# Patient Record
Sex: FEMALE | URBAN - METROPOLITAN AREA
[De-identification: names, ages, dates, MRNs, and addresses within clinical notes are randomized per-mention and may not be internally consistent; named-entity substitution may affect disease eponyms.]

---

## 2017-05-16 ENCOUNTER — INPATIENT (INPATIENT)
Facility: HOSPITAL | Age: 77
LOS: 0 days | Discharge: HOME | End: 2017-05-17
Attending: HOSPITALIST | Admitting: INTERNAL MEDICINE
Payer: MEDICARE

## 2017-05-16 VITALS — WEIGHT: 179.9 LBS | HEIGHT: 66 IN

## 2017-05-16 DIAGNOSIS — I48.91 UNSPECIFIED ATRIAL FIBRILLATION: ICD-10-CM

## 2017-05-16 DIAGNOSIS — E11.9 TYPE 2 DIABETES MELLITUS WITHOUT COMPLICATIONS: ICD-10-CM

## 2017-05-16 DIAGNOSIS — R60.0 LOCALIZED EDEMA: ICD-10-CM

## 2017-05-16 DIAGNOSIS — Z98.890 OTHER SPECIFIED POSTPROCEDURAL STATES: Chronic | ICD-10-CM

## 2017-05-16 DIAGNOSIS — I10 ESSENTIAL (PRIMARY) HYPERTENSION: ICD-10-CM

## 2017-05-16 DIAGNOSIS — I50.9 HEART FAILURE, UNSPECIFIED: ICD-10-CM

## 2017-05-16 DIAGNOSIS — Z29.9 ENCOUNTER FOR PROPHYLACTIC MEASURES, UNSPECIFIED: ICD-10-CM

## 2017-05-16 DIAGNOSIS — E89.0 POSTPROCEDURAL HYPOTHYROIDISM: Chronic | ICD-10-CM

## 2017-05-16 DIAGNOSIS — R94.6 ABNORMAL RESULTS OF THYROID FUNCTION STUDIES: ICD-10-CM

## 2017-05-16 LAB
ALBUMIN SERPL ELPH-MCNC: 3.4 G/DL — SIGNIFICANT CHANGE UP (ref 3.3–5)
ALP SERPL-CCNC: 82 U/L — SIGNIFICANT CHANGE UP (ref 40–120)
ALT FLD-CCNC: 44 U/L — SIGNIFICANT CHANGE UP (ref 12–78)
ANION GAP SERPL CALC-SCNC: 9 MMOL/L — SIGNIFICANT CHANGE UP (ref 5–17)
APPEARANCE UR: CLEAR — SIGNIFICANT CHANGE UP
APTT BLD: 31.4 SEC — SIGNIFICANT CHANGE UP (ref 27.5–37.4)
AST SERPL-CCNC: 30 U/L — SIGNIFICANT CHANGE UP (ref 15–37)
BASOPHILS # BLD AUTO: 0.1 K/UL — SIGNIFICANT CHANGE UP (ref 0–0.2)
BASOPHILS NFR BLD AUTO: 0.9 % — SIGNIFICANT CHANGE UP (ref 0–2)
BILIRUB SERPL-MCNC: 0.5 MG/DL — SIGNIFICANT CHANGE UP (ref 0.2–1.2)
BILIRUB UR-MCNC: NEGATIVE — SIGNIFICANT CHANGE UP
BUN SERPL-MCNC: 16 MG/DL — SIGNIFICANT CHANGE UP (ref 7–23)
CALCIUM SERPL-MCNC: 9.1 MG/DL — SIGNIFICANT CHANGE UP (ref 8.5–10.1)
CHLORIDE SERPL-SCNC: 105 MMOL/L — SIGNIFICANT CHANGE UP (ref 96–108)
CO2 SERPL-SCNC: 29 MMOL/L — SIGNIFICANT CHANGE UP (ref 22–31)
COLOR SPEC: YELLOW — SIGNIFICANT CHANGE UP
CREAT SERPL-MCNC: 0.68 MG/DL — SIGNIFICANT CHANGE UP (ref 0.5–1.3)
DIFF PNL FLD: NEGATIVE — SIGNIFICANT CHANGE UP
EOSINOPHIL # BLD AUTO: 0.1 K/UL — SIGNIFICANT CHANGE UP (ref 0–0.5)
EOSINOPHIL NFR BLD AUTO: 1.8 % — SIGNIFICANT CHANGE UP (ref 0–6)
GLUCOSE SERPL-MCNC: 102 MG/DL — HIGH (ref 70–99)
GLUCOSE UR QL: NEGATIVE MG/DL — SIGNIFICANT CHANGE UP
HCT VFR BLD CALC: 38.7 % — SIGNIFICANT CHANGE UP (ref 34.5–45)
HGB BLD-MCNC: 12.4 G/DL — SIGNIFICANT CHANGE UP (ref 11.5–15.5)
INR BLD: 1.1 RATIO — SIGNIFICANT CHANGE UP (ref 0.88–1.16)
KETONES UR-MCNC: NEGATIVE — SIGNIFICANT CHANGE UP
LEUKOCYTE ESTERASE UR-ACNC: NEGATIVE — SIGNIFICANT CHANGE UP
LYMPHOCYTES # BLD AUTO: 1.4 K/UL — SIGNIFICANT CHANGE UP (ref 1–3.3)
LYMPHOCYTES # BLD AUTO: 19.4 % — SIGNIFICANT CHANGE UP (ref 13–44)
MAGNESIUM SERPL-MCNC: 2.2 MG/DL — SIGNIFICANT CHANGE UP (ref 1.6–2.6)
MCHC RBC-ENTMCNC: 29 PG — SIGNIFICANT CHANGE UP (ref 27–34)
MCHC RBC-ENTMCNC: 32 GM/DL — SIGNIFICANT CHANGE UP (ref 32–36)
MCV RBC AUTO: 90.6 FL — SIGNIFICANT CHANGE UP (ref 80–100)
MONOCYTES # BLD AUTO: 0.7 K/UL — SIGNIFICANT CHANGE UP (ref 0–0.9)
MONOCYTES NFR BLD AUTO: 9.5 % — SIGNIFICANT CHANGE UP (ref 2–14)
NEUTROPHILS # BLD AUTO: 4.8 K/UL — SIGNIFICANT CHANGE UP (ref 1.8–7.4)
NEUTROPHILS NFR BLD AUTO: 68.3 % — SIGNIFICANT CHANGE UP (ref 43–77)
NITRITE UR-MCNC: NEGATIVE — SIGNIFICANT CHANGE UP
NT-PROBNP SERPL-SCNC: 2362 PG/ML — HIGH (ref 0–450)
PH UR: 7 — SIGNIFICANT CHANGE UP (ref 5–8)
PLATELET # BLD AUTO: 239 K/UL — SIGNIFICANT CHANGE UP (ref 150–400)
POTASSIUM SERPL-MCNC: 4 MMOL/L — SIGNIFICANT CHANGE UP (ref 3.5–5.3)
POTASSIUM SERPL-SCNC: 4 MMOL/L — SIGNIFICANT CHANGE UP (ref 3.5–5.3)
PROT SERPL-MCNC: 6.9 GM/DL — SIGNIFICANT CHANGE UP (ref 6–8.3)
PROT UR-MCNC: NEGATIVE MG/DL — SIGNIFICANT CHANGE UP
PROTHROM AB SERPL-ACNC: 11.9 SEC — SIGNIFICANT CHANGE UP (ref 9.8–12.7)
RBC # BLD: 4.27 M/UL — SIGNIFICANT CHANGE UP (ref 3.8–5.2)
RBC # FLD: 14.3 % — SIGNIFICANT CHANGE UP (ref 10.3–14.5)
SODIUM SERPL-SCNC: 143 MMOL/L — SIGNIFICANT CHANGE UP (ref 135–145)
SP GR SPEC: 1 — LOW (ref 1.01–1.02)
TROPONIN I SERPL-MCNC: <0.015 NG/ML — SIGNIFICANT CHANGE UP (ref 0.01–0.04)
TSH SERPL-MCNC: 5.36 UU/ML — HIGH (ref 0.36–3.74)
UROBILINOGEN FLD QL: NEGATIVE MG/DL — SIGNIFICANT CHANGE UP
WBC # BLD: 7.1 K/UL — SIGNIFICANT CHANGE UP (ref 3.8–10.5)
WBC # FLD AUTO: 7.1 K/UL — SIGNIFICANT CHANGE UP (ref 3.8–10.5)

## 2017-05-16 PROCEDURE — 93970 EXTREMITY STUDY: CPT | Mod: 26

## 2017-05-16 PROCEDURE — 93010 ELECTROCARDIOGRAM REPORT: CPT

## 2017-05-16 PROCEDURE — 99285 EMERGENCY DEPT VISIT HI MDM: CPT

## 2017-05-16 PROCEDURE — 71020: CPT | Mod: 26

## 2017-05-16 RX ORDER — HEPARIN SODIUM 5000 [USP'U]/ML
7000 INJECTION INTRAVENOUS; SUBCUTANEOUS ONCE
Qty: 0 | Refills: 0 | Status: COMPLETED | OUTPATIENT
Start: 2017-05-16 | End: 2017-05-16

## 2017-05-16 RX ORDER — DEXTROSE 50 % IN WATER 50 %
25 SYRINGE (ML) INTRAVENOUS ONCE
Qty: 0 | Refills: 0 | Status: DISCONTINUED | OUTPATIENT
Start: 2017-05-16 | End: 2017-05-17

## 2017-05-16 RX ORDER — HEPARIN SODIUM 5000 [USP'U]/ML
3500 INJECTION INTRAVENOUS; SUBCUTANEOUS EVERY 6 HOURS
Qty: 0 | Refills: 0 | Status: DISCONTINUED | OUTPATIENT
Start: 2017-05-16 | End: 2017-05-17

## 2017-05-16 RX ORDER — HEPARIN SODIUM 5000 [USP'U]/ML
7000 INJECTION INTRAVENOUS; SUBCUTANEOUS EVERY 6 HOURS
Qty: 0 | Refills: 0 | Status: DISCONTINUED | OUTPATIENT
Start: 2017-05-16 | End: 2017-05-17

## 2017-05-16 RX ORDER — BUDESONIDE, MICRONIZED 100 %
3 POWDER (GRAM) MISCELLANEOUS
Qty: 0 | Refills: 0 | COMMUNITY

## 2017-05-16 RX ORDER — FUROSEMIDE 40 MG
20 TABLET ORAL
Qty: 0 | Refills: 0 | Status: DISCONTINUED | OUTPATIENT
Start: 2017-05-17 | End: 2017-05-17

## 2017-05-16 RX ORDER — HEPARIN SODIUM 5000 [USP'U]/ML
6500 INJECTION INTRAVENOUS; SUBCUTANEOUS EVERY 6 HOURS
Qty: 0 | Refills: 0 | Status: DISCONTINUED | OUTPATIENT
Start: 2017-05-16 | End: 2017-05-16

## 2017-05-16 RX ORDER — DEXTROSE 50 % IN WATER 50 %
12.5 SYRINGE (ML) INTRAVENOUS ONCE
Qty: 0 | Refills: 0 | Status: DISCONTINUED | OUTPATIENT
Start: 2017-05-16 | End: 2017-05-17

## 2017-05-16 RX ORDER — DEXTROSE 50 % IN WATER 50 %
1 SYRINGE (ML) INTRAVENOUS ONCE
Qty: 0 | Refills: 0 | Status: DISCONTINUED | OUTPATIENT
Start: 2017-05-16 | End: 2017-05-17

## 2017-05-16 RX ORDER — TRAMADOL HYDROCHLORIDE 50 MG/1
1 TABLET ORAL
Qty: 0 | Refills: 0 | COMMUNITY

## 2017-05-16 RX ORDER — SENNA PLUS 8.6 MG/1
2 TABLET ORAL AT BEDTIME
Qty: 0 | Refills: 0 | Status: DISCONTINUED | OUTPATIENT
Start: 2017-05-16 | End: 2017-05-17

## 2017-05-16 RX ORDER — SODIUM CHLORIDE 9 MG/ML
3 INJECTION INTRAMUSCULAR; INTRAVENOUS; SUBCUTANEOUS ONCE
Qty: 0 | Refills: 0 | Status: COMPLETED | OUTPATIENT
Start: 2017-05-16 | End: 2017-05-16

## 2017-05-16 RX ORDER — DOCUSATE SODIUM 100 MG
100 CAPSULE ORAL THREE TIMES A DAY
Qty: 0 | Refills: 0 | Status: DISCONTINUED | OUTPATIENT
Start: 2017-05-16 | End: 2017-05-17

## 2017-05-16 RX ORDER — VALSARTAN 80 MG/1
1 TABLET ORAL
Qty: 0 | Refills: 0 | COMMUNITY

## 2017-05-16 RX ORDER — HEPARIN SODIUM 5000 [USP'U]/ML
INJECTION INTRAVENOUS; SUBCUTANEOUS
Qty: 25000 | Refills: 0 | Status: DISCONTINUED | OUTPATIENT
Start: 2017-05-16 | End: 2017-05-17

## 2017-05-16 RX ORDER — ESCITALOPRAM OXALATE 10 MG/1
1 TABLET, FILM COATED ORAL
Qty: 0 | Refills: 0 | COMMUNITY

## 2017-05-16 RX ORDER — ASPIRIN/CALCIUM CARB/MAGNESIUM 324 MG
81 TABLET ORAL DAILY
Qty: 0 | Refills: 0 | Status: DISCONTINUED | OUTPATIENT
Start: 2017-05-16 | End: 2017-05-17

## 2017-05-16 RX ORDER — BUDESONIDE, MICRONIZED 100 %
9 POWDER (GRAM) MISCELLANEOUS DAILY
Qty: 0 | Refills: 0 | Status: DISCONTINUED | OUTPATIENT
Start: 2017-05-16 | End: 2017-05-17

## 2017-05-16 RX ORDER — ONDANSETRON 8 MG/1
4 TABLET, FILM COATED ORAL EVERY 6 HOURS
Qty: 0 | Refills: 0 | Status: DISCONTINUED | OUTPATIENT
Start: 2017-05-16 | End: 2017-05-17

## 2017-05-16 RX ORDER — METHYLPREDNISOLONE 4 MG
500 TABLET ORAL
Qty: 0 | Refills: 0 | Status: DISCONTINUED | OUTPATIENT
Start: 2017-05-16 | End: 2017-05-17

## 2017-05-16 RX ORDER — HEPARIN SODIUM 5000 [USP'U]/ML
INJECTION INTRAVENOUS; SUBCUTANEOUS
Qty: 25000 | Refills: 0 | Status: DISCONTINUED | OUTPATIENT
Start: 2017-05-16 | End: 2017-05-16

## 2017-05-16 RX ORDER — SODIUM CHLORIDE 9 MG/ML
1000 INJECTION, SOLUTION INTRAVENOUS
Qty: 0 | Refills: 0 | Status: DISCONTINUED | OUTPATIENT
Start: 2017-05-16 | End: 2017-05-17

## 2017-05-16 RX ORDER — VALSARTAN 80 MG/1
160 TABLET ORAL DAILY
Qty: 0 | Refills: 0 | Status: DISCONTINUED | OUTPATIENT
Start: 2017-05-17 | End: 2017-05-17

## 2017-05-16 RX ORDER — GLUCAGON INJECTION, SOLUTION 0.5 MG/.1ML
1 INJECTION, SOLUTION SUBCUTANEOUS ONCE
Qty: 0 | Refills: 0 | Status: DISCONTINUED | OUTPATIENT
Start: 2017-05-16 | End: 2017-05-17

## 2017-05-16 RX ORDER — LOSARTAN POTASSIUM 100 MG/1
50 TABLET, FILM COATED ORAL DAILY
Qty: 0 | Refills: 0 | Status: DISCONTINUED | OUTPATIENT
Start: 2017-05-16 | End: 2017-05-16

## 2017-05-16 RX ORDER — HEPARIN SODIUM 5000 [USP'U]/ML
6500 INJECTION INTRAVENOUS; SUBCUTANEOUS ONCE
Qty: 0 | Refills: 0 | Status: DISCONTINUED | OUTPATIENT
Start: 2017-05-16 | End: 2017-05-16

## 2017-05-16 RX ORDER — HEPARIN SODIUM 5000 [USP'U]/ML
3000 INJECTION INTRAVENOUS; SUBCUTANEOUS EVERY 6 HOURS
Qty: 0 | Refills: 0 | Status: DISCONTINUED | OUTPATIENT
Start: 2017-05-16 | End: 2017-05-16

## 2017-05-16 RX ADMIN — HEPARIN SODIUM 1600 UNIT(S)/HR: 5000 INJECTION INTRAVENOUS; SUBCUTANEOUS at 21:58

## 2017-05-16 RX ADMIN — Medication 9 MILLIGRAM(S): at 22:01

## 2017-05-16 RX ADMIN — HEPARIN SODIUM 7000 UNIT(S): 5000 INJECTION INTRAVENOUS; SUBCUTANEOUS at 21:55

## 2017-05-16 RX ADMIN — SODIUM CHLORIDE 3 MILLILITER(S): 9 INJECTION INTRAMUSCULAR; INTRAVENOUS; SUBCUTANEOUS at 15:02

## 2017-05-16 NOTE — ED PROVIDER NOTE - MUSCULOSKELETAL, MLM
Spine appears normal, range of motion is not limited, no muscle or joint tenderness. Bilateral lower extremity edema.

## 2017-05-16 NOTE — ED ADULT NURSE REASSESSMENT NOTE - NS ED NURSE REASSESS COMMENT FT1
Pt resting comfortably. Safety maintained. A+Ox3 VSS. Updated on admission status. Awaiting Tele orders and bed assignment.Will continue to monitor.
 noted on monitor while urinating at bedside on commode. pt c/p palpitation. at bedside. pt denies palpitation and hr 89 noted on monitor. Daughter at bedside. Will cont to monitor for safety and comfort.
Pt actual weight of 90kg noted. Dr. farrell made aware and waiting for a new heparin order. Will cont to monitor for safety and comfort.
Report taken at the change of shift at bedside. Pt awake alert and oriented x4 resting comfortably in bed with no acute distress noted. Vitals stable. Denies cp,sob,ha,dz,n/v/d/fever/chills or urinary sx at this time. daughter at bedside. Will cont to monitor for safety and comfort.
request food. Dr. Batres made aware and states pt okay to eat. Benson and ginger ale provided. Pt tolerating po intake well. Will cont ot monitor for safety and comfort.

## 2017-05-16 NOTE — ED PROVIDER NOTE - CARE PLAN
Principal Discharge DX:	Atrial fibrillation, new onset  Secondary Diagnosis:	Acute congestive heart failure, unspecified congestive heart failure type

## 2017-05-16 NOTE — H&P ADULT - PROBLEM SELECTOR PLAN 4
~pt with prior hx of papillary thyroid ca s/p partial thyroidectomy  ~f/u Thyroid profile to further assess

## 2017-05-16 NOTE — H&P ADULT - PROBLEM SELECTOR PLAN 6
~pt takes Avandia 4mg po daily at home  ~will d/c from this point  ~FS qAC  ~cont. ISS per protocol ~pt takes Avandia 4mg po daily at home  ~will d/c from this point (RE; Glitazone-CHF link)  ~FS qAC  ~cont. ISS per protocol

## 2017-05-16 NOTE — ED PROVIDER NOTE - PROGRESS NOTE DETAILS
Amanda Jackson, on behalf of Attending, Patient with episodes of afib in the 140's, improving without meds. Will admit patient.

## 2017-05-16 NOTE — ED PROVIDER NOTE - OBJECTIVE STATEMENT
77 y/o F with hx of HTN and GERD presents to the ED c/o intermittent chest pressure and palpitations x 3 days. C/o discomfort in the abd like a bloating worse at night. Pressure in Chest is described as a "slight pushing" in the mid chest. She also notes some difficulty breathing and generalized weakness. Pt has no known hx of afib. No smoking, Drug use, ETOH use. PMD is Dr. Bj Coates.

## 2017-05-16 NOTE — ED PROVIDER NOTE - NS ED MD SCRIBE ATTENDING SCRIBE SECTIONS
RESULTS/DISPOSITION/HISTORY OF PRESENT ILLNESS/VITAL SIGNS( Pullset)/PHYSICAL EXAM/REVIEW OF SYSTEMS/PROGRESS NOTE/PAST MEDICAL/SURGICAL/SOCIAL HISTORY

## 2017-05-16 NOTE — H&P ADULT - ASSESSMENT
75 y/o F PMHx significant for HTN, GERD who presents to the ED w/ c/o intermittent mid-sternal chest pressure associated w/ palpitations over the past 3 days duration. The patient notes increased symptoms of dyspnea, and generalized malaise. In the ED the patient was found to be in AFib w/ RVR in the 140s which improved without intervention. At the patient time the patient remains in AFib w/ RVR 90s-110/min. Labs were notable for pro-BNP 2362, TSH 5.36. CXR was c/w CHF. In the ED the patient was started on Heparin gtt per protocol, and Diltiazem 30mg PO q6h.

## 2017-05-16 NOTE — H&P ADULT - PSH
H/O partial thyroidectomy  hx of Papillary Thyroid Ca s/p partial thyroidectomy 7/2014  S/P right knee arthroscopy  4/2006

## 2017-05-16 NOTE — ED STATDOCS - PROGRESS NOTE DETAILS
Manuel Philip: 77 y/o F with hx of HTN and GERD presents to the ED c/o intermittent chest pressure and palpitations x 3 days. She also notes some difficulty breathing and generalized weakness. Pt has no known hx of afib. Pt to be sent to main ED for further evaluation.

## 2017-05-16 NOTE — H&P ADULT - NSHPPHYSICALEXAM_GEN_ALL_CORE
Vital Signs Last 24 Hrs  T(C): 36.4, Max: 36.4 (05-16 @ 12:54)  T(F): 97.6, Max: 97.6 (05-16 @ 12:54)  HR: 94 (94 - 94)  BP: 135/75 (135/75 - 135/75)  RR: 18 (18 - 18)  SpO2: 96% (96% - 96%)

## 2017-05-16 NOTE — ED STATDOCS - DETAILS:
I, Omar Lee MD, performed the initial face to face bedside interview with this patient regarding history of present illness and determined that the patient should be seen in the main ED.  The history, was documented by the scribe in my presence and I attest to the accuracy of the documentation.

## 2017-05-17 VITALS
TEMPERATURE: 98 F | OXYGEN SATURATION: 97 % | HEART RATE: 102 BPM | DIASTOLIC BLOOD PRESSURE: 80 MMHG | SYSTOLIC BLOOD PRESSURE: 128 MMHG

## 2017-05-17 LAB
APTT BLD: 131.6 SEC — CRITICAL HIGH (ref 27.5–37.4)
APTT BLD: > 200 SEC (ref 27.5–37.4)
HBA1C BLD-MCNC: 6.2 % — HIGH (ref 4–5.6)
HCT VFR BLD CALC: 37.2 % — SIGNIFICANT CHANGE UP (ref 34.5–45)
HGB BLD-MCNC: 11.6 G/DL — SIGNIFICANT CHANGE UP (ref 11.5–15.5)
MCHC RBC-ENTMCNC: 28.8 PG — SIGNIFICANT CHANGE UP (ref 27–34)
MCHC RBC-ENTMCNC: 31.3 GM/DL — LOW (ref 32–36)
MCV RBC AUTO: 92.1 FL — SIGNIFICANT CHANGE UP (ref 80–100)
PLATELET # BLD AUTO: 196 K/UL — SIGNIFICANT CHANGE UP (ref 150–400)
RBC # BLD: 4.04 M/UL — SIGNIFICANT CHANGE UP (ref 3.8–5.2)
RBC # FLD: 15.4 % — HIGH (ref 10.3–14.5)
T3 SERPL-MCNC: 109 NG/DL — SIGNIFICANT CHANGE UP (ref 80–200)
T4 AB SER-ACNC: 9 UG/DL — SIGNIFICANT CHANGE UP (ref 4.6–12)
TROPONIN I SERPL-MCNC: <0.015 NG/ML — SIGNIFICANT CHANGE UP (ref 0.01–0.04)
WBC # BLD: 8 K/UL — SIGNIFICANT CHANGE UP (ref 3.8–10.5)
WBC # FLD AUTO: 8 K/UL — SIGNIFICANT CHANGE UP (ref 3.8–10.5)

## 2017-05-17 PROCEDURE — 93306 TTE W/DOPPLER COMPLETE: CPT | Mod: 26

## 2017-05-17 RX ORDER — VALSARTAN 80 MG/1
1 TABLET ORAL
Qty: 30 | Refills: 0
Start: 2017-05-17

## 2017-05-17 RX ORDER — PANTOPRAZOLE SODIUM 20 MG/1
40 TABLET, DELAYED RELEASE ORAL
Qty: 0 | Refills: 0 | Status: DISCONTINUED | OUTPATIENT
Start: 2017-05-17 | End: 2017-05-17

## 2017-05-17 RX ORDER — ASPIRIN/CALCIUM CARB/MAGNESIUM 324 MG
1 TABLET ORAL
Qty: 0 | Refills: 0 | DISCHARGE
Start: 2017-05-17

## 2017-05-17 RX ORDER — RIVAROXABAN 15 MG-20MG
20 KIT ORAL DAILY
Qty: 0 | Refills: 0 | Status: DISCONTINUED | OUTPATIENT
Start: 2017-05-17 | End: 2017-05-17

## 2017-05-17 RX ORDER — ACETAMINOPHEN 500 MG
650 TABLET ORAL EVERY 6 HOURS
Qty: 0 | Refills: 0 | Status: DISCONTINUED | OUTPATIENT
Start: 2017-05-17 | End: 2017-05-17

## 2017-05-17 RX ORDER — RIVAROXABAN 15 MG-20MG
1 KIT ORAL
Qty: 30 | Refills: 0
Start: 2017-05-17

## 2017-05-17 RX ORDER — DILTIAZEM HCL 120 MG
1 CAPSULE, EXT RELEASE 24 HR ORAL
Qty: 30 | Refills: 0
Start: 2017-05-17

## 2017-05-17 RX ORDER — MAGNESIUM OXIDE 400 MG ORAL TABLET 241.3 MG
400 TABLET ORAL EVERY 12 HOURS
Qty: 0 | Refills: 0 | Status: DISCONTINUED | OUTPATIENT
Start: 2017-05-17 | End: 2017-05-17

## 2017-05-17 RX ADMIN — Medication 20 MILLIGRAM(S): at 07:25

## 2017-05-17 RX ADMIN — Medication 9 MILLIGRAM(S): at 07:25

## 2017-05-17 RX ADMIN — PANTOPRAZOLE SODIUM 40 MILLIGRAM(S): 20 TABLET, DELAYED RELEASE ORAL at 11:27

## 2017-05-17 RX ADMIN — VALSARTAN 160 MILLIGRAM(S): 80 TABLET ORAL at 07:24

## 2017-05-17 RX ADMIN — Medication 650 MILLIGRAM(S): at 14:49

## 2017-05-17 RX ADMIN — RIVAROXABAN 20 MILLIGRAM(S): KIT at 14:49

## 2017-05-17 RX ADMIN — HEPARIN SODIUM 0 UNIT(S)/HR: 5000 INJECTION INTRAVENOUS; SUBCUTANEOUS at 13:24

## 2017-05-17 RX ADMIN — HEPARIN SODIUM 0 UNIT(S)/HR: 5000 INJECTION INTRAVENOUS; SUBCUTANEOUS at 05:10

## 2017-05-17 RX ADMIN — Medication 81 MILLIGRAM(S): at 11:26

## 2017-05-17 RX ADMIN — Medication 650 MILLIGRAM(S): at 00:14

## 2017-05-17 RX ADMIN — HEPARIN SODIUM 1300 UNIT(S)/HR: 5000 INJECTION INTRAVENOUS; SUBCUTANEOUS at 06:15

## 2017-05-17 NOTE — DISCHARGE NOTE ADULT - CARE PROVIDER_API CALL
Jaswant Cerda), Cardiovascular Disease; Internal Medicine; Nuclear Cardiology  [address]  [place] [address]  Phone: [phone]  Fax: [phone]    Yoni Garcia (), Family Medicine  [address] [place] [address]  Phone: [phone]  Fax: [phone]

## 2017-05-17 NOTE — DISCHARGE NOTE ADULT - MEDICATION SUMMARY - MEDICATIONS TO TAKE
I will START or STAY ON the medications listed below when I get home from the hospital:    freetext medication  -  -- 1 tab(s) by mouth once a day  -- Indication: For Dyslipidemia    Entocort EC 3 mg oral capsule, extended release  -- 3 cap(s) by mouth once a day  -- Indication: For immunotherapy    aspirin 81 mg oral delayed release tablet  -- 1 tab(s) by mouth once a day  -- Indication: For Preventive medicine    valsartan 160 mg oral tablet  -- 1 tab(s) by mouth once a day  -- Indication: For Htn    Cardizem  mg/24 hours oral capsule, extended release  -- 1 cap(s) by mouth once a day  -- It is very important that you take or use this exactly as directed.  Do not skip doses or discontinue unless directed by your doctor.  Some non-prescription drugs may aggravate your condition.  Read all labels carefully.  If a warning appears, check with your doctor before taking.  Swallow whole.  Do not crush.    -- Indication: For Atrial fibrillation, new onset    Xarelto 20 mg oral tablet  -- 1 tab(s) by mouth once a day  -- Check with your doctor before becoming pregnant.  It is very important that you take or use this exactly as directed.  Do not skip doses or discontinue unless directed by your doctor.  Obtain medical advice before taking any non-prescription drugs as some may affect the action of this medication.  Take with food.    -- Indication: For Atrial fibrillation, new onset    Avandia 4 mg oral tablet  -- 1 tab(s) by mouth once a day  -- Indication: For Diabetes    Vytorin 10 mg-40 mg oral tablet  -- 1 tab(s) by mouth once a day  -- Indication: For Dyslipdemia    magnesium gluconate 500 mg oral tablet  -- 1 tab(s) by mouth 2 times a day  -- Indication: For Supplement    omeprazole 40 mg oral delayed release capsule  -- 1 cap(s) by mouth once a day  -- Indication: For Gerd    multivitamin  --     -- Indication: For Supplement    Vitamin C 1000 mg oral tablet  -- 1 tab(s) by mouth once a day  -- Indication: For Supplement I will START or STAY ON the medications listed below when I get home from the hospital:    freetext medication  -  -- 1 tab(s) by mouth once a day  -- Indication: For Dyslipidemia    Entocort EC 3 mg oral capsule, extended release  -- 3 cap(s) by mouth once a day  -- Indication: For immunotherapy    aspirin 81 mg oral delayed release tablet  -- 1 tab(s) by mouth once a day  -- Indication: For Preventive medicine    Diovan 160 mg oral tablet  -- 1 tab(s) by mouth once a day  -- Do not take this drug if you are pregnant.  It is very important that you take or use this exactly as directed.  Do not skip doses or discontinue unless directed by your doctor.  Some non-prescription drugs may aggravate your condition.  Read all labels carefully.  If a warning appears, check with your doctor before taking.    -- Indication: For Htn    Cardizem  mg/24 hours oral capsule, extended release  -- 1 cap(s) by mouth once a day  -- It is very important that you take or use this exactly as directed.  Do not skip doses or discontinue unless directed by your doctor.  Some non-prescription drugs may aggravate your condition.  Read all labels carefully.  If a warning appears, check with your doctor before taking.  Swallow whole.  Do not crush.    -- Indication: For Atrial fibrillation, new onset    Xarelto 20 mg oral tablet  -- 1 tab(s) by mouth once a day  -- Check with your doctor before becoming pregnant.  It is very important that you take or use this exactly as directed.  Do not skip doses or discontinue unless directed by your doctor.  Obtain medical advice before taking any non-prescription drugs as some may affect the action of this medication.  Take with food.    -- Indication: For Atrial fibrillation, new onset    Vytorin 10 mg-40 mg oral tablet  -- 1 tab(s) by mouth once a day  -- Indication: For Dyslipdemia    hydroCHLOROthiazide 12.5 mg oral capsule  -- 1 cap(s) by mouth every 48 hours  -- Avoid prolonged or excessive exposure to direct and/or artificial sunlight while taking this medication.  It is very important that you take or use this exactly as directed.  Do not skip doses or discontinue unless directed by your doctor.  It may be advisable to drink a full glass orange juice or eat a banana daily while taking this medication.  Take with food or milk.    -- Indication: For le edema    magnesium gluconate 500 mg oral tablet  -- 1 tab(s) by mouth 2 times a day  -- Indication: For Supplement    omeprazole 40 mg oral delayed release capsule  -- 1 cap(s) by mouth once a day  -- Indication: For Gerd    multivitamin  --     -- Indication: For Supplement    Vitamin C 1000 mg oral tablet  -- 1 tab(s) by mouth once a day  -- Indication: For Supplement

## 2017-05-17 NOTE — DISCHARGE NOTE ADULT - PLAN OF CARE
anticoagulation with xarelto follow up with Dr Spangler, if any SOB chest pain or palpitations, call [phone]

## 2017-05-17 NOTE — CONSULT NOTE ADULT - ASSESSMENT
77 y/o F new onset AF with rapid ventricular rates         ·  Problem: Atrial fibrillation, increase cardizem for rate control  ~serial Goldie- normal   2DECHO in the am  ~pt started on Heparin gtt per protocol as [id number] score is 5. - can transition to  xarelto or eliquis          Bilateral edema of lower extremity.  Plan: ~likely due to volume overload/CHF  ~r/o DVT; f/u B/L LE dopplers.         Essential hypertension.  Plan: ~will cont. Diovan 160mg po daily.

## 2017-05-17 NOTE — DISCHARGE NOTE ADULT - MEDICATION SUMMARY - MEDICATIONS TO STOP TAKING
I will STOP taking the medications listed below when I get home from the hospital:    Diovan  mg-25 mg oral tablet  -- 1 tab(s) by mouth once a day

## 2017-05-17 NOTE — DISCHARGE NOTE ADULT - NS AS DC STROKE ED MATERIALS
Prescribed Medications/Need for Followup After Discharge/Stroke Warning Signs and Symptoms/Stroke Education Booklet/Call 911 for Stroke/Risk Factors for Stroke

## 2017-05-17 NOTE — DISCHARGE NOTE ADULT - CARE PLAN
Principal Discharge DX:	Atrial fibrillation, new onset  Goal:	anticoagulation with xarelto  Instructions for follow-up, activity and diet:	follow up with Dr Spangler, if any SOB chest pain or palpitations, call [phone]

## 2017-05-17 NOTE — DISCHARGE NOTE ADULT - HOSPITAL COURSE
77 y/o F PMHx significant for HTN, GERD who presents to the ED w/ c/o intermittent mid-sternal chest pressure associated w/ palpitations over the past 3 days duration. The patient notes increased symptoms of dyspnea, and generalized malaise. In the ED the patient was found to be in AFib w/ RVR in the 140s which improved without intervention. At the patient time the patient remains in AFib w/ RVR 90s-110/min. Echo neg for valvular etiology for new onset afib.CHADS VASC 5. Cardio recc include CCB with addition of A/C with NOAC.       REVIEW OF SYSTEMS:  Neg on 10 point system          Vital Signs Last 24 Hrs  T(C): 36.6, Max: 36.8 (05-17 @ 05:09)  T(F): 97.8, Max: 98.3 (05-17 @ 05:09)  HR: 92 (90 - 98)  BP: 121/65 (121/65 - 140/79)  BP(mean): --  RR: 17 (17 - 18)  SpO2: 97% (95% - 99%)            PHYSICAL EXAM:    Constitutional: NAD, awake and alert, well-developed  HEENT: PERR, EOMI, Normal Hearing, MMM  Neck: Soft and supple, No LAD, No JVD  Respiratory: Breath sounds are clear bilaterally, No wheezing, rales or rhonchi  Cardiovascular: S1 and S2, regular rate and rhythm, no Murmurs, gallops or rubs  Gastrointestinal: Bowel Sounds present, soft, nontender, nondistended, no guarding, no rebound  Extremities: No peripheral edema  Vascular: 2+ peripheral pulses  Neurological: A/O x 3, no focal deficits  Musculoskeletal: 5/5 strength b/l upper and lower extremities  Skin: No rashes    MEDICATIONS:  reviewed.           LABS: All Labs Reviewed:                        11.6   8.0   )-----------( 196      ( 17 May 2017 03:54 )             37.2     05-16    143  |  105  |  16  ----------------------------<  102<H>  4.0   |  29  |  0.68    Ca    9.1      16 May 2017 13:31  Mg     2.2     05-16    TPro  6.9  /  Alb  3.4  /  TBili  0.5  /  DBili  x   /  AST  30  /  ALT  44  /  AlkPhos  82  05-16    PT/INR - ( 16 May 2017 20:47 )   PT: 11.9 sec;   INR: 1.10 ratio         PTT - ( 17 May 2017 12:32 )  PTT:131.6 sec  Cardiac enzymes neg x 3          Blood Culture:     RADIOLOGY/EKG:  B/L LE doppler    IMPRESSION:   No evidence of acute DVT within the visualized deep venous system of   either lower extremity.    CXR:  Impression:    Findings likely represent congestive heart failure. Clinical and lab   correlation strongly recommended.    Hyperinflated lungs.    Total D/C time > 30 min

## 2017-05-17 NOTE — CONSULT NOTE ADULT - SUBJECTIVE AND OBJECTIVE BOX
CHIEF COMPLAINT: Patient is a 76y old  Female who presents with a chief complaint of "Chest pain and palpitations" (16 May 2017 17:38)      HPI:  75 y/o F PMHx significant for HTN, GERD who presents to the ED w/ c/o intermittent mid-sternal chest pressure associated w/ palpitations over the past 3 days duration. The patient notes increased symptoms of dyspnea, and generalized malaise. In the ED the patient was found to be in AFib w/ RVR in the 140s which improved without intervention. At the patient time the patient remains in AFib w/ RVR 90s-110/min. Labs were notable for pro-BNP 2362, TSH 5.36. CXR was c/w CHF. In the ED the patient was started on Heparin gtt per protocol, and Diltiazem 30mg PO q6h. (16 May 2017 17:38)      PMHx: PAST MEDICAL & SURGICAL HISTORY:  Gastroesophageal reflux disease, esophagitis presence not specified  Essential hypertension  S/P right knee arthroscopy: 4/2006  H/O partial thyroidectomy: hx of Papillary Thyroid Ca s/p partial thyroidectomy 7/2014        Soc Hx:       Allergies: Allergies    No Known Allergies    Intolerances          REVIEW OF SYSTEMS:    CONSTITUTIONAL: No weakness, fevers or chills  EYES/ENT: No visual changes;  No vertigo or throat pain   NECK: No pain or stiffness  RESPIRATORY: No cough, wheezing, hemoptysis; No shortness of breath  CARDIOVASCULAR: No chest pain or palpitations  GASTROINTESTINAL: No abdominal or epigastric pain. No nausea, vomiting, or hematemesis; No diarrhea or constipation. No melena or hematochezia.  GENITOURINARY: No dysuria, frequency or hematuria  NEUROLOGICAL: No numbness or weakness  SKIN: No itching, burning, rashes, or lesions   All other review of systems is negative unless indicated above    Vital Signs Last 24 Hrs  T(C): 36.8, Max: 36.8 (05-17 @ 05:09)  T(F): 98.3, Max: 98.3 (05-17 @ 05:09)  HR: 91 (90 - 98)  BP: 133/83 (131/73 - 140/79)  BP(mean): --  RR: 17 (17 - 18)  SpO2: 96% (95% - 99%)    I&O's Summary    I & Os for current day (as of 17 May 2017 07:53)  =============================================  IN: 129 ml / OUT: 0 ml / NET: 129 ml          PHYSICAL EXAM:   Constitutional: NAD, awake and alert, well-developed  HEENT: PERR, EOMI, Normal Hearing, MMM  Neck: Soft and supple, No LAD, No JVD  Respiratory: Breath sounds are clear bilaterally, No wheezing, rales or rhonchi  Cardiovascular: S1 and S2, regular rate and rhythm, no Murmurs, gallops or rubs  Gastrointestinal: Bowel Sounds present, soft, nontender, nondistended, no guarding, no rebound  Extremities: No peripheral edema  Vascular: 2+ peripheral pulses  Neurological: A/O x 3, no focal deficits  Musculoskeletal: 5/5 strength b/l upper and lower extremities  Skin: No rashes    MEDICATIONS:  MEDICATIONS  (STANDING):  aspirin enteric coated 81milliGRAM(s) Oral daily  diltiazem    Tablet 30milliGRAM(s) Oral four times a day  buDESOnide    EC Capsule 9milliGRAM(s) Oral daily  freetext medication  -  Oral daily  freetext medication  - 40milliGRAM(s) Oral daily  magnesium gluconate 500milliGRAM(s) Oral two times a day  valsartan 160milliGRAM(s) Oral daily  dextrose 5%. 1000milliLiter(s) IV Continuous <Continuous>  dextrose 50% Injectable 12.5Gram(s) IV Push once  dextrose 50% Injectable 25Gram(s) IV Push once  dextrose 50% Injectable 25Gram(s) IV Push once  furosemide   Injectable 20milliGRAM(s) IV Push two times a day  heparin  Infusion. Unit(s)/Hr IV Continuous <Continuous>  magnesium oxide 400milliGRAM(s) Oral every 12 hours      LABS: All Labs Reviewed:                        11.6   8.0   )-----------( 196      ( 17 May 2017 03:54 )             37.2     05-16    143  |  105  |  16  ----------------------------<  102<H>  4.0   |  29  |  0.68    Ca    9.1      16 May 2017 13:31  Mg     2.2     05-16    TPro  6.9  /  Alb  3.4  /  TBili  0.5  /  DBili  x   /  AST  30  /  ALT  44  /  AlkPhos  82  05-16    PT/INR - ( 16 May 2017 20:47 )   PT: 11.9 sec;   INR: 1.10 ratio         PTT - ( 17 May 2017 03:54 )  PTT:> 200 sec  CARDIAC MARKERS ( 16 May 2017 23:33 )  <0.015 ng/mL / x     / x     / x     / x      CARDIAC MARKERS ( 16 May 2017 19:26 )  <0.015 ng/mL / x     / x     / x     / x      CARDIAC MARKERS ( 16 May 2017 16:36 )  <0.015 ng/mL / x     / x     / x     / x      CARDIAC MARKERS ( 16 May 2017 13:31 )  <0.015 ng/mL / x     / x     / x     / x          Serum Pro-Brain Natriuretic Peptide: 2362 pg/mL (05-16 @ 13:31)    Blood Culture:   BNP     RADIOLOGY:    EKG:    Telemetry:    ECHO:

## 2017-05-22 DIAGNOSIS — K21.9 GASTRO-ESOPHAGEAL REFLUX DISEASE WITHOUT ESOPHAGITIS: ICD-10-CM

## 2017-05-22 DIAGNOSIS — I50.33 ACUTE ON CHRONIC DIASTOLIC (CONGESTIVE) HEART FAILURE: ICD-10-CM

## 2017-05-22 DIAGNOSIS — R07.9 CHEST PAIN, UNSPECIFIED: ICD-10-CM

## 2017-05-22 DIAGNOSIS — I48.91 UNSPECIFIED ATRIAL FIBRILLATION: ICD-10-CM

## 2017-05-22 DIAGNOSIS — I11.0 HYPERTENSIVE HEART DISEASE WITH HEART FAILURE: ICD-10-CM

## 2018-02-16 PROBLEM — I10 ESSENTIAL (PRIMARY) HYPERTENSION: Chronic | Status: ACTIVE | Noted: 2017-05-16

## 2018-02-16 PROBLEM — K21.9 GASTRO-ESOPHAGEAL REFLUX DISEASE WITHOUT ESOPHAGITIS: Chronic | Status: ACTIVE | Noted: 2017-05-16

## 2018-02-28 ENCOUNTER — APPOINTMENT (OUTPATIENT)
Dept: VASCULAR SURGERY | Facility: CLINIC | Age: 78
End: 2018-02-28
Payer: MEDICARE

## 2018-02-28 VITALS
TEMPERATURE: 98.5 F | WEIGHT: 200 LBS | OXYGEN SATURATION: 99 % | BODY MASS INDEX: 39.79 KG/M2 | RESPIRATION RATE: 16 BRPM | DIASTOLIC BLOOD PRESSURE: 76 MMHG | HEIGHT: 59.5 IN | HEART RATE: 88 BPM | SYSTOLIC BLOOD PRESSURE: 115 MMHG

## 2018-02-28 DIAGNOSIS — Z82.49 FAMILY HISTORY OF ISCHEMIC HEART DISEASE AND OTHER DISEASES OF THE CIRCULATORY SYSTEM: ICD-10-CM

## 2018-02-28 DIAGNOSIS — R60.0 LOCALIZED EDEMA: ICD-10-CM

## 2018-02-28 DIAGNOSIS — Z78.9 OTHER SPECIFIED HEALTH STATUS: ICD-10-CM

## 2018-02-28 DIAGNOSIS — I87.2 VENOUS INSUFFICIENCY (CHRONIC) (PERIPHERAL): ICD-10-CM

## 2018-02-28 PROCEDURE — 99203 OFFICE O/P NEW LOW 30 MIN: CPT

## 2018-02-28 PROCEDURE — 93970 EXTREMITY STUDY: CPT

## 2018-03-06 PROBLEM — Z78.9 NON-SMOKER: Status: ACTIVE | Noted: 2018-02-28

## 2018-03-06 PROBLEM — I87.2 VENOUS INSUFFICIENCY OF LEFT LOWER EXTREMITY: Status: ACTIVE | Noted: 2018-03-06

## 2018-03-06 PROBLEM — Z82.49 FAMILY HISTORY OF HYPERTENSION: Status: ACTIVE | Noted: 2018-02-28

## 2018-03-28 ENCOUNTER — APPOINTMENT (OUTPATIENT)
Dept: PULMONOLOGY | Facility: CLINIC | Age: 78
End: 2018-03-28
Payer: MEDICARE

## 2018-03-28 VITALS
HEIGHT: 59 IN | DIASTOLIC BLOOD PRESSURE: 78 MMHG | BODY MASS INDEX: 39.51 KG/M2 | HEART RATE: 96 BPM | SYSTOLIC BLOOD PRESSURE: 118 MMHG | OXYGEN SATURATION: 99 % | WEIGHT: 196 LBS

## 2018-03-28 DIAGNOSIS — Z86.79 PERSONAL HISTORY OF OTHER DISEASES OF THE CIRCULATORY SYSTEM: ICD-10-CM

## 2018-03-28 DIAGNOSIS — Z00.00 ENCOUNTER FOR GENERAL ADULT MEDICAL EXAMINATION W/OUT ABNORMAL FINDINGS: ICD-10-CM

## 2018-03-28 DIAGNOSIS — K21.9 GASTRO-ESOPHAGEAL REFLUX DISEASE W/OUT ESOPHAGITIS: ICD-10-CM

## 2018-03-28 PROCEDURE — 94729 DIFFUSING CAPACITY: CPT

## 2018-03-28 PROCEDURE — 99205 OFFICE O/P NEW HI 60 MIN: CPT | Mod: 25

## 2018-03-28 PROCEDURE — 85018 HEMOGLOBIN: CPT | Mod: QW

## 2018-03-28 PROCEDURE — 94010 BREATHING CAPACITY TEST: CPT

## 2018-03-28 PROCEDURE — 94727 GAS DIL/WSHOT DETER LNG VOL: CPT

## 2018-04-04 ENCOUNTER — APPOINTMENT (OUTPATIENT)
Dept: VASCULAR SURGERY | Facility: CLINIC | Age: 78
End: 2018-04-04

## 2018-04-06 ENCOUNTER — FORM ENCOUNTER (OUTPATIENT)
Age: 78
End: 2018-04-06

## 2018-04-07 ENCOUNTER — OUTPATIENT (OUTPATIENT)
Dept: OUTPATIENT SERVICES | Facility: HOSPITAL | Age: 78
LOS: 1 days | End: 2018-04-07
Payer: MEDICARE

## 2018-04-07 ENCOUNTER — APPOINTMENT (OUTPATIENT)
Dept: CT IMAGING | Facility: CLINIC | Age: 78
End: 2018-04-07

## 2018-04-07 DIAGNOSIS — Z98.890 OTHER SPECIFIED POSTPROCEDURAL STATES: Chronic | ICD-10-CM

## 2018-04-07 DIAGNOSIS — R05 COUGH: ICD-10-CM

## 2018-04-07 DIAGNOSIS — E89.0 POSTPROCEDURAL HYPOTHYROIDISM: Chronic | ICD-10-CM

## 2018-04-07 PROCEDURE — 70486 CT MAXILLOFACIAL W/O DYE: CPT

## 2018-04-07 PROCEDURE — 71250 CT THORAX DX C-: CPT | Mod: 26

## 2018-04-07 PROCEDURE — 70486 CT MAXILLOFACIAL W/O DYE: CPT | Mod: 26

## 2018-04-07 PROCEDURE — 71250 CT THORAX DX C-: CPT

## 2018-05-14 ENCOUNTER — APPOINTMENT (OUTPATIENT)
Dept: PULMONOLOGY | Facility: CLINIC | Age: 78
End: 2018-05-14
Payer: MEDICARE

## 2018-05-14 VITALS — HEART RATE: 101 BPM | OXYGEN SATURATION: 97 % | DIASTOLIC BLOOD PRESSURE: 60 MMHG | SYSTOLIC BLOOD PRESSURE: 110 MMHG

## 2018-05-14 VITALS — WEIGHT: 206 LBS | BODY MASS INDEX: 41.61 KG/M2

## 2018-05-14 DIAGNOSIS — R05 COUGH: ICD-10-CM

## 2018-05-14 DIAGNOSIS — R93.8 ABNORMAL FINDINGS ON DIAGNOSTIC IMAGING OF OTHER SPECIFIED BODY STRUCTURES: ICD-10-CM

## 2018-05-14 PROCEDURE — 94010 BREATHING CAPACITY TEST: CPT

## 2018-05-14 PROCEDURE — 99215 OFFICE O/P EST HI 40 MIN: CPT | Mod: 25

## 2018-05-14 RX ORDER — MOMETASONE 50 UG/1
50 SPRAY, METERED NASAL DAILY
Qty: 1 | Refills: 5 | Status: DISCONTINUED | COMMUNITY
Start: 2018-03-28 | End: 2018-05-14

## 2018-08-21 ENCOUNTER — EMERGENCY (EMERGENCY)
Facility: HOSPITAL | Age: 78
LOS: 0 days | Discharge: HOME | End: 2018-08-21
Attending: EMERGENCY MEDICINE
Payer: OTHER MISCELLANEOUS

## 2018-08-21 VITALS
RESPIRATION RATE: 18 BRPM | SYSTOLIC BLOOD PRESSURE: 129 MMHG | DIASTOLIC BLOOD PRESSURE: 56 MMHG | HEART RATE: 100 BPM | WEIGHT: 210.1 LBS | OXYGEN SATURATION: 100 % | TEMPERATURE: 98 F | HEIGHT: 61 IN

## 2018-08-21 DIAGNOSIS — S93.402A SPRAIN OF UNSPECIFIED LIGAMENT OF LEFT ANKLE, INITIAL ENCOUNTER: ICD-10-CM
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
## 2018-08-21 DIAGNOSIS — I48.91 UNSPECIFIED ATRIAL FIBRILLATION: ICD-10-CM

## 2018-08-21 DIAGNOSIS — R60.0 LOCALIZED EDEMA: ICD-10-CM

## 2018-08-21 DIAGNOSIS — Z98.890 OTHER SPECIFIED POSTPROCEDURAL STATES: Chronic | ICD-10-CM

## 2018-08-21 DIAGNOSIS — I50.9 HEART FAILURE, UNSPECIFIED: ICD-10-CM
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
## 2018-08-21 DIAGNOSIS — K21.9 GASTRO-ESOPHAGEAL REFLUX DISEASE WITHOUT ESOPHAGITIS: ICD-10-CM

## 2018-08-21 DIAGNOSIS — Z79.01 LONG TERM (CURRENT) USE OF ANTICOAGULANTS: ICD-10-CM

## 2018-08-21 DIAGNOSIS — M25.572 PAIN IN LEFT ANKLE AND JOINTS OF LEFT FOOT: ICD-10-CM
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
## 2018-08-21 DIAGNOSIS — Z79.899 OTHER LONG TERM (CURRENT) DRUG THERAPY: ICD-10-CM

## 2018-08-21 DIAGNOSIS — E89.0 POSTPROCEDURAL HYPOTHYROIDISM: Chronic | ICD-10-CM

## 2018-08-21 DIAGNOSIS — I10 ESSENTIAL (PRIMARY) HYPERTENSION: ICD-10-CM

## 2018-08-21 PROCEDURE — 99284 EMERGENCY DEPT VISIT MOD MDM: CPT

## 2018-08-21 PROCEDURE — 73630 X-RAY EXAM OF FOOT: CPT | Mod: 26,LT

## 2018-08-21 PROCEDURE — 73610 X-RAY EXAM OF ANKLE: CPT | Mod: 26,LT

## 2018-08-21 RX ORDER — ACETAMINOPHEN 500 MG
650 TABLET ORAL ONCE
Qty: 0 | Refills: 0 | Status: COMPLETED | OUTPATIENT
Start: 2018-08-21 | End: 2018-08-21

## 2018-08-21 RX ADMIN — Medication 650 MILLIGRAM(S): at 17:43

## 2018-08-21 NOTE — ED STATDOCS - MEDICAL DECISION MAKING DETAILS
76 y/o female with hx chronic ankle pain form injury years ago presents to ED with worsening L ankle pain after exerting herself while walking few days ago. never fell. No other injury or sx. Pt is limping secondary pain. ambulatory with cane. mild TTP of L medial foot and ankle on exam. Will obtain XRs too r/o fx, sx control, was reassess. 76 y/o female with hx chronic ankle pain form injury years ago presents to ED with worsening L ankle pain after exerting herself while walking few days ago. never fell. No other injury or sx. Pt is limping secondary pain. ambulatory with cane. mild TTP of L medial foot and ankle on exam. Will obtain XRs to r/o fx, sx control, was reassess.

## 2018-08-21 NOTE — ED STATDOCS - NS ED ROS FT
Constitutional: No fever or chills  Eyes: No visual changes  HEENT: No throat pain  CV: No chest pain  Resp: No SOB no cough  GI: No abd pain, nausea or vomiting  : No dysuria  MSK: +L ankle pain with difficulty bearing weight.  Skin: +b/l ankle edema  Neuro: No headache

## 2018-08-21 NOTE — ED STATDOCS - OBJECTIVE STATEMENT
78 y/o female with PMHx of HTN, GERD, CHF, Afib on Xarelto presents to the ED c/o L ankle pain x2 days. +difficulty bearing weight on able. +chronic b/l ankle edema. Ambulating with limp. Notes that she has been walking more frequently, which may have prompted sx. Denies fever, chills, N/V, redness. Notes injury in 1988 after which she has had physical therapy intermittently. Cardio/Dr. Jaswant Cerda. 76 y/o female with PMHx of HTN, GERD, CHF, Afib on Xarelto presents to the ED c/o L ankle pain x2 days. +difficulty bearing weight on ankle. +chronic b/l ankle edema. Ambulating with limp. Notes that she has been walking more frequently, which may have prompted sx. Denies fever, chills, N/V, redness. Notes injury in 1988 after which she has had physical therapy intermittently. Cardio/Dr. Jaswant Cerda.

## 2018-08-21 NOTE — ED STATDOCS - PROGRESS NOTE DETAILS
78 y/o F with PMH of HTN, GERD, CHF, a-fib on xarelto presents with left ankle pain x 2 days. States she is unable to walk due to the pain. Denies acute injury including slip/fall. Denies numbness/tingling in extremities, fever, chills, nausea, vomiting. PE: NAD. Bilateral LE edema. No obvious deformity, ecchymosis, erythema in bilateral lower extremities. +tenderness to palpation over left medial malleolus. Pain with inversion of left ankle. No pain reported with dorsiflexion or plantarflexion. 5/5 lower extremity strength. Sensation intact to light touch in lower extremities. Cap refill < 2 sec in bilateral LE. DP/PT pulses 2+ bilaterally. A/P r/o fracture. Analgesia. XRays. Reassess. - Farooq Quigley PA-C Air cast applied. pt reports pain with ambulation. Ambulatory with use of cane. - Farooq Quigley PA-C

## 2018-08-21 NOTE — ED STATDOCS - NS_ ATTENDINGSCRIBEDETAILS _ED_A_ED_FT
I, Charles Boo MD,  performed the initial face to face bedside interview with this patient regarding history of present illness, review of symptoms and relevant past medical, social and family history.  I completed an independent physical examination.  I was the initial provider who evaluated this patient.  The history, relevant review of systems, past medical and surgical history, medical decision making, and physical examination was documented by the scribe in my presence and I attest to the accuracy of the documentation.

## 2018-08-21 NOTE — ED STATDOCS - PHYSICAL EXAMINATION
Constitutional: mild distress AAOx3  Eyes: PERRLA EOMI  Head: Normocephalic atraumatic  Mouth: MMM  Cardiac: regular rate   Resp: Lungs CTAB  GI: Abd s/nt/nd  Neuro: CN2-12 intact  Skin: No rashes. +mild b/l ankle swelling chronic   MSK: +mild TTP of the medial aspect of L foot and ankle. normal pulses. compartments soft.

## 2018-08-21 NOTE — ED STATDOCS - PMH
Afib    CHF (congestive heart failure)    Essential hypertension    Gastroesophageal reflux disease, esophagitis presence not specified

## 2018-08-21 NOTE — ED ADULT NURSE NOTE - NSIMPLEMENTINTERV_GEN_ALL_ED
Implemented All Fall Risk Interventions:  [place] to call system. Call bell, personal items and telephone within reach. Instruct patient to call for assistance. Room bathroom lighting operational. Non-slip footwear when patient is off stretcher. Physically safe environment: no spills, clutter or unnecessary equipment. Stretcher in lowest position, wheels locked, appropriate side rails in place. Provide visual cue, wrist band, yellow gown, etc. Monitor gait and stability. Monitor for mental status changes and reorient to person, place, and time. Review medications for side effects contributing to fall risk. Reinforce activity limits and safety measures with patient and family.

## 2018-09-03 PROBLEM — R60.0 BILATERAL EDEMA OF LOWER EXTREMITY: Status: ACTIVE | Noted: 2018-02-28

## 2018-09-12 ENCOUNTER — APPOINTMENT (OUTPATIENT)
Dept: DERMATOLOGY | Facility: CLINIC | Age: 78
End: 2018-09-12

## 2018-12-27 ENCOUNTER — RX RENEWAL (OUTPATIENT)
Age: 78
End: 2018-12-27

## 2019-07-02 ENCOUNTER — INPATIENT (INPATIENT)
Facility: HOSPITAL | Age: 79
LOS: 1 days | Discharge: HOME | End: 2019-07-04
Attending: INTERNAL MEDICINE | Admitting: INTERNAL MEDICINE
Payer: MEDICARE

## 2019-07-02 VITALS
RESPIRATION RATE: 19 BRPM | HEART RATE: 67 BPM | SYSTOLIC BLOOD PRESSURE: 122 MMHG | TEMPERATURE: 98 F | DIASTOLIC BLOOD PRESSURE: 60 MMHG | OXYGEN SATURATION: 100 %

## 2019-07-02 DIAGNOSIS — Z98.890 OTHER SPECIFIED POSTPROCEDURAL STATES: Chronic | ICD-10-CM

## 2019-07-02 DIAGNOSIS — E89.0 POSTPROCEDURAL HYPOTHYROIDISM: Chronic | ICD-10-CM

## 2019-07-02 PROBLEM — I48.91 UNSPECIFIED ATRIAL FIBRILLATION: Chronic | Status: ACTIVE | Noted: 2018-08-21

## 2019-07-02 PROBLEM — I50.9 HEART FAILURE, UNSPECIFIED: Chronic | Status: ACTIVE | Noted: 2018-08-21

## 2019-07-02 LAB
ABO RH CONFIRMATION: SIGNIFICANT CHANGE UP
ADD ON TEST-SPECIMEN IN LAB: SIGNIFICANT CHANGE UP
ALBUMIN SERPL ELPH-MCNC: 3.1 G/DL — LOW (ref 3.3–5)
ALP SERPL-CCNC: 54 U/L — SIGNIFICANT CHANGE UP (ref 40–120)
ALT FLD-CCNC: 18 U/L — SIGNIFICANT CHANGE UP (ref 12–78)
ANION GAP SERPL CALC-SCNC: 10 MMOL/L — SIGNIFICANT CHANGE UP (ref 5–17)
APTT BLD: 28 SEC — SIGNIFICANT CHANGE UP (ref 27.5–36.3)
AST SERPL-CCNC: 10 U/L — LOW (ref 15–37)
BASOPHILS # BLD AUTO: 0.03 K/UL — SIGNIFICANT CHANGE UP (ref 0–0.2)
BASOPHILS NFR BLD AUTO: 0.4 % — SIGNIFICANT CHANGE UP (ref 0–2)
BILIRUB SERPL-MCNC: 0.4 MG/DL — SIGNIFICANT CHANGE UP (ref 0.2–1.2)
BUN SERPL-MCNC: 22 MG/DL — SIGNIFICANT CHANGE UP (ref 7–23)
CALCIUM SERPL-MCNC: 8.5 MG/DL — SIGNIFICANT CHANGE UP (ref 8.5–10.1)
CHLORIDE SERPL-SCNC: 109 MMOL/L — HIGH (ref 96–108)
CK SERPL-CCNC: 37 U/L — SIGNIFICANT CHANGE UP (ref 26–192)
CO2 SERPL-SCNC: 24 MMOL/L — SIGNIFICANT CHANGE UP (ref 22–31)
CREAT SERPL-MCNC: 0.64 MG/DL — SIGNIFICANT CHANGE UP (ref 0.5–1.3)
EOSINOPHIL # BLD AUTO: 0.17 K/UL — SIGNIFICANT CHANGE UP (ref 0–0.5)
EOSINOPHIL NFR BLD AUTO: 2.4 % — SIGNIFICANT CHANGE UP (ref 0–6)
GLUCOSE SERPL-MCNC: 85 MG/DL — SIGNIFICANT CHANGE UP (ref 70–99)
HCT VFR BLD CALC: 26.8 % — LOW (ref 34.5–45)
HCT VFR BLD CALC: 27.7 % — LOW (ref 34.5–45)
HGB BLD-MCNC: 7.5 G/DL — LOW (ref 11.5–15.5)
HGB BLD-MCNC: 8.2 G/DL — LOW (ref 11.5–15.5)
IMM GRANULOCYTES NFR BLD AUTO: 0.3 % — SIGNIFICANT CHANGE UP (ref 0–1.5)
INR BLD: 1.53 RATIO — HIGH (ref 0.88–1.16)
LIDOCAIN IGE QN: 235 U/L — SIGNIFICANT CHANGE UP (ref 73–393)
LYMPHOCYTES # BLD AUTO: 1.62 K/UL — SIGNIFICANT CHANGE UP (ref 1–3.3)
LYMPHOCYTES # BLD AUTO: 22.6 % — SIGNIFICANT CHANGE UP (ref 13–44)
MAGNESIUM SERPL-MCNC: 2.3 MG/DL — SIGNIFICANT CHANGE UP (ref 1.6–2.6)
MCHC RBC-ENTMCNC: 23.9 PG — LOW (ref 27–34)
MCHC RBC-ENTMCNC: 28 GM/DL — LOW (ref 32–36)
MCV RBC AUTO: 85.4 FL — SIGNIFICANT CHANGE UP (ref 80–100)
MONOCYTES # BLD AUTO: 0.86 K/UL — SIGNIFICANT CHANGE UP (ref 0–0.9)
MONOCYTES NFR BLD AUTO: 12 % — SIGNIFICANT CHANGE UP (ref 2–14)
NEUTROPHILS # BLD AUTO: 4.46 K/UL — SIGNIFICANT CHANGE UP (ref 1.8–7.4)
NEUTROPHILS NFR BLD AUTO: 62.3 % — SIGNIFICANT CHANGE UP (ref 43–77)
NT-PROBNP SERPL-SCNC: 1686 PG/ML — HIGH (ref 0–450)
PLATELET # BLD AUTO: 321 K/UL — SIGNIFICANT CHANGE UP (ref 150–400)
POTASSIUM SERPL-MCNC: 3.6 MMOL/L — SIGNIFICANT CHANGE UP (ref 3.5–5.3)
POTASSIUM SERPL-SCNC: 3.6 MMOL/L — SIGNIFICANT CHANGE UP (ref 3.5–5.3)
PROT SERPL-MCNC: 6.6 GM/DL — SIGNIFICANT CHANGE UP (ref 6–8.3)
PROTHROM AB SERPL-ACNC: 17.2 SEC — HIGH (ref 10–12.9)
RBC # BLD: 3.14 M/UL — LOW (ref 3.8–5.2)
RBC # FLD: 17.1 % — HIGH (ref 10.3–14.5)
SODIUM SERPL-SCNC: 143 MMOL/L — SIGNIFICANT CHANGE UP (ref 135–145)
TROPONIN I SERPL-MCNC: <0.015 NG/ML — SIGNIFICANT CHANGE UP (ref 0.01–0.04)
TYPE + AB SCN PNL BLD: SIGNIFICANT CHANGE UP
WBC # BLD: 7.16 K/UL — SIGNIFICANT CHANGE UP (ref 3.8–10.5)
WBC # FLD AUTO: 7.16 K/UL — SIGNIFICANT CHANGE UP (ref 3.8–10.5)

## 2019-07-02 PROCEDURE — 99285 EMERGENCY DEPT VISIT HI MDM: CPT

## 2019-07-02 PROCEDURE — 93010 ELECTROCARDIOGRAM REPORT: CPT

## 2019-07-02 PROCEDURE — 71045 X-RAY EXAM CHEST 1 VIEW: CPT | Mod: 26

## 2019-07-02 RX ORDER — METHYLPREDNISOLONE 4 MG
1 TABLET ORAL
Qty: 0 | Refills: 0 | DISCHARGE

## 2019-07-02 RX ORDER — BUDESONIDE, MICRONIZED 100 %
3 POWDER (GRAM) MISCELLANEOUS
Qty: 0 | Refills: 0 | DISCHARGE

## 2019-07-02 RX ORDER — OMEPRAZOLE 10 MG/1
1 CAPSULE, DELAYED RELEASE ORAL
Qty: 0 | Refills: 0 | DISCHARGE

## 2019-07-02 RX ORDER — EZETIMIBE AND SIMVASTATIN 10; 80 MG/1; MG/1
1 TABLET, FILM COATED ORAL
Qty: 0 | Refills: 0 | DISCHARGE

## 2019-07-02 RX ORDER — FLUTICASONE PROPIONATE 50 MCG
1 SPRAY, SUSPENSION NASAL DAILY
Refills: 0 | Status: DISCONTINUED | OUTPATIENT
Start: 2019-07-02 | End: 2019-07-04

## 2019-07-02 RX ORDER — PANTOPRAZOLE SODIUM 20 MG/1
80 TABLET, DELAYED RELEASE ORAL ONCE
Refills: 0 | Status: COMPLETED | OUTPATIENT
Start: 2019-07-02 | End: 2019-07-02

## 2019-07-02 RX ORDER — SODIUM CHLORIDE 9 MG/ML
1000 INJECTION, SOLUTION INTRAVENOUS
Refills: 0 | Status: DISCONTINUED | OUTPATIENT
Start: 2019-07-02 | End: 2019-07-03

## 2019-07-02 RX ORDER — DILTIAZEM HCL 120 MG
180 CAPSULE, EXT RELEASE 24 HR ORAL DAILY
Refills: 0 | Status: DISCONTINUED | OUTPATIENT
Start: 2019-07-02 | End: 2019-07-04

## 2019-07-02 RX ORDER — PANTOPRAZOLE SODIUM 20 MG/1
8 TABLET, DELAYED RELEASE ORAL
Qty: 80 | Refills: 0 | Status: DISCONTINUED | OUTPATIENT
Start: 2019-07-02 | End: 2019-07-03

## 2019-07-02 RX ORDER — ASCORBIC ACID 60 MG
1 TABLET,CHEWABLE ORAL
Qty: 0 | Refills: 0 | DISCHARGE

## 2019-07-02 RX ADMIN — PANTOPRAZOLE SODIUM 80 MILLIGRAM(S): 20 TABLET, DELAYED RELEASE ORAL at 17:05

## 2019-07-02 RX ADMIN — PANTOPRAZOLE SODIUM 10 MG/HR: 20 TABLET, DELAYED RELEASE ORAL at 17:26

## 2019-07-02 RX ADMIN — SODIUM CHLORIDE 60 MILLILITER(S): 9 INJECTION, SOLUTION INTRAVENOUS at 22:12

## 2019-07-02 NOTE — H&P ADULT - NSHPPHYSICALEXAM_GEN_ALL_CORE
Vital Signs Last 24 Hrs  T(C): 36.7 (02 Jul 2019 14:59), Max: 36.8 (02 Jul 2019 12:01)  T(F): 98.1 (02 Jul 2019 14:59), Max: 98.2 (02 Jul 2019 12:01)  HR: 85 (02 Jul 2019 14:59) (67 - 85)  BP: 110/79 (02 Jul 2019 14:59) (110/79 - 122/60)  RR: 21 (02 Jul 2019 14:59) (19 - 21)  SpO2: 100% (02 Jul 2019 14:59) (100% - 100%)

## 2019-07-02 NOTE — H&P ADULT - NSICDXPASTMEDICALHX_GEN_ALL_CORE_FT
PAST MEDICAL HISTORY:  Afib     CHF (congestive heart failure)     Essential hypertension     Gastroesophageal reflux disease, esophagitis presence not specified

## 2019-07-02 NOTE — PATIENT PROFILE ADULT - OVER THE PAST TWO WEEKS HAVE YOU FELT DOWN, DEPRESSED OR HOPELESS?
Notified Resident at 2355 PM regarding elevated temperature.      Spoke with: [id number] Brown    Orders were not obtained.    Comments: Temp of 100.8, pulse 115.  Will continue to monitor.         no

## 2019-07-02 NOTE — CONSULT NOTE ADULT - SUBJECTIVE AND OBJECTIVE BOX
CHIEF COMPLAINT: Patient is a 78y old  Female who presents with a chief complaint of complain of weakness and sob (02 Jul 2019 16:23)      HPI:  77 yo Female with a PMHx of CHF, HTN, A-fib on Xarelto presents to ED with daughter with complain of generalized weakness and Shortness of breath x1 month. Patient notes that walking exacerbates shortness of breath; can only take 1-2 steps. Patient was sent by MD Cerda after having an hgb of 7. Patient states she has not needed a blood transfusion in the past. Daughter at bedside reports that pt has recently been bruising extremely easily. She Denies Chest, abd pain, blood in vomit, headache, nausea/vomiting. Patient is Guaiac + positive in ED. She has h/o gastric ulceration in the past. 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 No nausea, vomiting, or hematemesis; No diarrhea or constipation. No melena or hematochezia.  GENITOURINARY: No dysuria, frequency or hematuria  NEUROLOGICAL: No numbness or weakness  SKIN: No itching, burning, rashes, or lesions   All other review of systems is negative unless indicated above    Vital Signs Last 24 Hrs  T(C): 37 (02 Jul 2019 17:40), Max: 37.1 (02 Jul 2019 17:01)  T(F): 98.6 (02 Jul 2019 17:40), Max: 98.8 (02 Jul 2019 17:01)  HR: 88 (02 Jul 2019 17:40) (67 - 88)  BP: 117/72 (02 Jul 2019 17:40) (101/62 - 122/60)  BP(mean): --  RR: 18 (02 Jul 2019 17:40) (18 - 21)  SpO2: 97% (02 Jul 2019 17:40) (97% - 100%)    I&O's Summary          PHYSICAL EXAM:   Constitutional: NAD, awake and alert, well-developed  HEENT: PERR, EOMI, Normal Hearing, MMM  Neck: Soft and supple, No LAD, No JVD  Respiratory: Breath sounds are clear bilaterally, No wheezing, rales or rhonchi  Cardiovascular: S1 and S2, regular rate and rhythm, no Murmurs, gallops or rubs  Gastrointestinal: Bowel Sounds present, soft, nontender, nondistended, no guarding, no rebound  Extremities: No peripheral edema  Vascular: 2+ peripheral pulses  Neurological: A/O x 3, no focal deficits  Musculoskeletal: 5/5 strength b/l upper and lower extremities  Skin: No rashes    MEDICATIONS:  MEDICATIONS  (STANDING):  dextrose 5% + sodium chloride 0.45%. 1000 milliLiter(s) (60 mL/Hr) IV Continuous <Continuous>  diltiazem    milliGRAM(s) Oral daily  fluticasone propionate 50 MICROgram(s)/spray Nasal Spray 1 Spray(s) Both Nostrils daily  multivitamin 1 Tablet(s) Oral daily  pantoprazole Infusion 8 mG/Hr (10 mL/Hr) IV Continuous <Continuous>      LABS: All Labs Reviewed:                        7.5    7.16  )-----------( 321      ( 02 Jul 2019 13:31 )             26.8     07-02    143  |  109<H>  |  22  ----------------------------<  85  3.6   |  24  |  0.64    Ca    8.5      02 Jul 2019 15:26  Mg     2.3     07-02    TPro  6.6  /  Alb  3.1<L>  /  TBili  0.4  /  DBili  x   /  AST  10<L>  /  ALT  18  /  AlkPhos  54  07-02    PT/INR - ( 02 Jul 2019 13:31 )   PT: 17.2 sec;   INR: 1.53 ratio         PTT - ( 02 Jul 2019 13:31 )  PTT:28.0 sec  CARDIAC MARKERS ( 02 Jul 2019 15:26 )  <0.015 ng/mL / x     / 37 U/L / x     / x          Serum Pro-Brain Natriuretic Peptide: 1686 pg/mL (07-02 @ 15:26)      EKG: pending

## 2019-07-02 NOTE — CONSULT NOTE ADULT - ASSESSMENT
78 F with HTN pAF on xarelto for evaluation of symptomatic anemia      AF- hold xarelto for symptomatic anemia-  rate controlled with cardizem    recommend transfusion 2 units PRBC   recommend  GI evaluation for EGD and colonoscopy to determine source of bleeding     tele monitering , supportive care , PPI    NPO past midnight

## 2019-07-02 NOTE — ED STATDOCS - PROGRESS NOTE DETAILS
77 y/o F anticoagulated on Eliquis with hx of CHF, HTN, and Afib presents to the ED with her daughter c/o generalized fatigue, weakness, light-headedness, and SOB for 3 weeks. Pt was sent by her cardiologist (Dr. Valente Cerda) after having a Hgb of 7. Dispo to [facility] for further eval and possible transfusion.

## 2019-07-02 NOTE — H&P ADULT - ASSESSMENT
79 yo female presented with Acute blood loss anemia due to GI bleeding and significant shortness of breath.     A/P:    1.  Acute Blood loss anemia  GI bleeding  Shortness of breath: within 2-3 steps  -will get 1 unit of PRBC in ED  -will keep NPO  -started on IV protonix drip  -follow Hb/Hct  -follow GI consult    2.  HTN  -follow BP and adjust BP meds as needed    3.  h/o A fib  -HR controlled  -continue diltiazem  -hold Xarelto  -follow cardiology consult     4.  SCD for DVT ppx    5.  Code status: Full code.

## 2019-07-02 NOTE — H&P ADULT - NSICDXPASTSURGICALHX_GEN_ALL_CORE_FT
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

## 2019-07-02 NOTE — H&P ADULT - HISTORY OF PRESENT ILLNESS
79 yo F with a PMHx of CHF, HTN, A-fib on Xarelto presents to ED with daughter with complain of generalized weakness and SOB x1 month. Patient notes that walking exacerbates shortness of breath; can only take 1-2 steps. Patient was sent by MD Cerda after having an hgb of 7. Patient states she has not needed a blood transfusion in the past. Daughter at bedside reports that pt has recently been bruising extremely easily. She Denies Chest, abd pain, blood in vomit, HA, or N/V. 77 yo Female with a PMHx of CHF, HTN, A-fib on Xarelto presents to ED with daughter with complain of generalized weakness and Shortness of breath x1 month. Patient notes that walking exacerbates shortness of breath; can only take 1-2 steps. Patient was sent by MD Cerda after having an hgb of 7. Patient states she has not needed a blood transfusion in the past. Daughter at bedside reports that pt has recently been bruising extremely easily. She Denies Chest, abd pain, blood in vomit, headache, nausea/vomiting. Patient is Guaiac + positive in ED. She has h/o gastric ulceration in the past.

## 2019-07-02 NOTE — PATIENT PROFILE ADULT - NSASFALLNEEDSASSIST_GEN_A_NUR
1/18/18  3:57PM  Called Ms. Francisco Javier Wilkes at home to confirm PCP JOHN MACKENZIE appt with Dr. Lemuel Price. Pt already aware of appt.  Milady Mak CM Specialist    1/18/18   8:30AM  PCP LU[facility] appt scheduled with Dr. Lemuel Price on 1/24/18 at 10:00AM. Appt added to AVS. Milady Mak CM Specialist normal... no

## 2019-07-02 NOTE — H&P ADULT - NEUROLOGICAL DETAILS
normal strength/sensation intact/deep reflexes intact/alert and oriented x 3/responds to verbal commands/responds to pain/cranial nerves intact

## 2019-07-02 NOTE — PATIENT PROFILE ADULT - DO YOU FEEL LIKE HURTING YOURSELF OR OTHERS?
Carisa pt. Had you send scripts too does not have tramadol in stock. They have it at the one on [address]. Med is pended. I called pharmacy to confirm as well. He picked up antibiotic already so just this one needs to be sent.   no

## 2019-07-02 NOTE — ED PROVIDER NOTE - OBJECTIVE STATEMENT
77 y/o F with a PMHx of CHF, HTN, A-fib on Xarelto presents to ED with daughter c/o generalized weakness and SOB x1 month. Pt notes that walking exacerbates SOB; can only take 1-2 steps. Pt was sent by MD Cerda after having an hgb of 7. Pt states she has not needed a blood transfusion in the past. Daughter at bedside reports that pt has recently been bruising extremely easily. Pt was prescribed Lasix for leg swelling by MD. Denies CP, abd pain, melena, blood in vomit, HA, or N/V. Cardiologist: Dr. Cerda. PMD: Dr. Garcia. Pt is anticoagulated on Xarelto.

## 2019-07-03 LAB
FERRITIN SERPL-MCNC: 8 NG/ML — LOW (ref 15–150)
HCT VFR BLD CALC: 30.8 % — LOW (ref 34.5–45)
HGB BLD-MCNC: 10.2 G/DL — LOW (ref 11.5–15.5)
HGB BLD-MCNC: 9.2 G/DL — LOW (ref 11.5–15.5)
IRON SATN MFR SERPL: 39 UG/DL — SIGNIFICANT CHANGE UP (ref 30–160)
IRON SATN MFR SERPL: 9 % — LOW (ref 14–50)
MCHC RBC-ENTMCNC: 25.2 PG — LOW (ref 27–34)
MCHC RBC-ENTMCNC: 29.9 GM/DL — LOW (ref 32–36)
MCV RBC AUTO: 84.4 FL — SIGNIFICANT CHANGE UP (ref 80–100)
PLATELET # BLD AUTO: 239 K/UL — SIGNIFICANT CHANGE UP (ref 150–400)
RBC # BLD: 3.65 M/UL — LOW (ref 3.8–5.2)
RBC # FLD: 16.3 % — HIGH (ref 10.3–14.5)
TIBC SERPL-MCNC: 434 UG/DL — HIGH (ref 220–430)
UIBC SERPL-MCNC: 395 UG/DL — HIGH (ref 110–370)
WBC # BLD: 5.66 K/UL — SIGNIFICANT CHANGE UP (ref 3.8–10.5)
WBC # FLD AUTO: 5.66 K/UL — SIGNIFICANT CHANGE UP (ref 3.8–10.5)

## 2019-07-03 PROCEDURE — 99223 1ST HOSP IP/OBS HIGH 75: CPT

## 2019-07-03 RX ORDER — PANTOPRAZOLE SODIUM 20 MG/1
40 TABLET, DELAYED RELEASE ORAL
Refills: 0 | Status: DISCONTINUED | OUTPATIENT
Start: 2019-07-03 | End: 2019-07-04

## 2019-07-03 RX ORDER — ACETAMINOPHEN 500 MG
650 TABLET ORAL EVERY 6 HOURS
Refills: 0 | Status: DISCONTINUED | OUTPATIENT
Start: 2019-07-03 | End: 2019-07-04

## 2019-07-03 RX ADMIN — Medication 180 MILLIGRAM(S): at 10:25

## 2019-07-03 RX ADMIN — Medication 650 MILLIGRAM(S): at 21:04

## 2019-07-03 RX ADMIN — PANTOPRAZOLE SODIUM 10 MG/HR: 20 TABLET, DELAYED RELEASE ORAL at 01:23

## 2019-07-03 RX ADMIN — PANTOPRAZOLE SODIUM 40 MILLIGRAM(S): 20 TABLET, DELAYED RELEASE ORAL at 10:40

## 2019-07-03 RX ADMIN — Medication 1 SPRAY(S): at 21:02

## 2019-07-03 RX ADMIN — Medication 650 MILLIGRAM(S): at 21:33

## 2019-07-03 RX ADMIN — Medication 650 MILLIGRAM(S): at 13:18

## 2019-07-03 RX ADMIN — Medication 650 MILLIGRAM(S): at 14:20

## 2019-07-03 NOTE — CONSULT NOTE ADULT - ASSESSMENT
H & P


Source: Patient, EMS


Time Seen by Provider: 04/20/19 02:38


HPI/ROS: 





HPI





CHIEF COMPLAINT:  Alcohol Intoxication 





HISTORY OF PRESENT ILLNESS:  16-year-old female presents emergency room with 

acute alcohol intoxication.  Patient was found by police and EMS highly 

intoxicated alcohol vomiting.  She is brought to the emergency room for further 

evaluation.  Her parents for contact or stand a local hotel there from out of 

town.  Apparently she snuck out of her hotel and drove to the [place].  She then 

consumed alcohol became highly intoxicated arrived to the emergency room.


She arrives to the emergency room highly intoxicated with alcohol.  Mom spoke 

with, consents for treatment, but will not be coming here, as her daughter took 

the car. 








Past Medical History:  Unknown medical history





Past Surgical History:  Unknown surgical history





Social History:  Large amount of alcohol this evening.





Family History:  Noncontributory








ROS   


REVIEW OF SYSTEMS:


10 Systems were reviewed and negative with the exception of the elements 

mentioned in the history of present illness.








Exam   


Constitutional   Intoxicated, triage nursing summary reviewed, vital signs 

reviewed, Sleepy, smells of alcohol


Eyes   normal conjunctivae and sclera, horizontal beating nystagmus consistent 

acute alcohol intoxication, otherwise pupils equal and react to light


HENT   normal inspection, atraumatic, moist mucus membranes, no epistaxis, neck 

supple/ no meningismus, no raccoon eyes. 


Respiratory   clear to auscultation bilaterally, normal breath sounds, no 

respiratory distress, no wheezing. 


Cardiovascular   rate normal, regular rhythm, no murmur, no edema, distal 

pulses normal. 


Gastrointestinal   soft, non-tender, no rebound, no guarding, normal bowel 

sounds, no distension, no pulsatile mass. 


Genitourinary   no CVA tenderness. 


Musculoskeletal  no midline vertebral tenderness, full range of motion, no calf 

swelling, no tenderness of extremities, no meningismus, good pulses, 

neurovascularly intact.


Skin   pink, warm, & dry, no rash, skin atraumatic. 


Neurologic   sleepy, intoxicated with alcohol,, alert and oriented x 3, AAOx3, 

moves all 4 extremities equally, motor intact, sensory intact, CN II-XII intact

, , normal vision, normal speech. 


Psychiatric   normal mood/affect. 


Heme/Lymph/Immune   no lymphadenopathy.





Differential Diagnosis:  Includes but is not limited to in a particular order 

acute alcohol intoxication, alcohol abuse, dehydration, electrolyte abnormality

, nausea vomiting from acute alcohol intoxication





Medical Decision Making:  Plan for this patient IV establishment IV fluid bolus 

Zofran for nausea and vomiting, serum alcohol level, check electrolytes, 

cardiac monitor, watch for worsening of condition, monitor for sobriety





Re-evaluation:











Serum alcohol level 275. 








0630am: Patient sleeping


0700am: Patient signed over to Dr. Orozco Pending Sobriety. Family to . (

Shilo Warren)


Constitutional: 





 Initial Vital Signs











Temperature (C)  36.7 C   04/20/19 02:38


 


Heart Rate  112 H  04/20/19 02:38


 


Respiratory Rate  16   04/20/19 02:38


 


Blood Pressure  94/63 L  04/20/19 02:38


 


O2 Sat (%)  96   04/20/19 02:38








 











O2 Delivery Mode               Room Air


 


O2 (L/minute)                  2














Allergies/Adverse Reactions: 


 





No Known Allergies Allergy (Unverified 04/20/19 02:37)


 








Home Medications: 














 Medication  Instructions  Recorded


 


NK [No Known Home Meds]  04/20/19














Medical Decision Making


ED Course/Re-evaluation: 





8:30 a.m.-able to walk with a steady gait.  Will discharge the patient home 

with mother. (Mariza Orozco)





- Data Points


Laboratory Results: 





 Laboratory Results





 04/20/19 02:10 





 04/20/19 02:10 





 











  04/20/19 04/20/19





  02:10 02:10


 


WBC    8.76 10^3/uL 10^3/uL





    (3.80-9.50) 


 


RBC    4.96 10^6/uL 10^6/uL





    (3.90-5.30) 


 


Hgb    12.8 g/dL g/dL





    (10.5-16.0) 


 


Hct    39.3 % %





    (34.0-49.0) 


 


MCV    79.2 fL fL





    (75.0-98.0) 


 


MCH    25.8 pg pg





    (24.0-33.0) 


 


MCHC    32.6 g/dL g/dL





    (31.0-36.0) 


 


RDW    13.8 % %





    (11.5-15.2) 


 


Plt Count    392 10^3/uL 10^3/uL





    (150-400) 


 


MPV    9.2 fL fL





    (8.7-11.7) 


 


Neut % (Auto)    78.5 % H %





    (39.3-74.2) 


 


Lymph % (Auto)    14.7 % L %





    (15.0-45.0) 


 


Mono % (Auto)    6.1 % %





    (4.5-13.0) 


 


Eos % (Auto)    0.2 % L %





    (0.6-7.6) 


 


Baso % (Auto)    0.3 % %





    (0.3-1.7) 


 


Nucleat RBC Rel Count    0.0 % %





    (0.0-0.2) 


 


Absolute Neuts (auto)    6.87 10^3/uL H 10^3/uL





    (1.70-6.50) 


 


Absolute Lymphs (auto)    1.29 10^3/uL 10^3/uL





    (1.00-3.00) 


 


Absolute Monos (auto)    0.53 10^3/uL 10^3/uL





    (0.30-0.80) 


 


Absolute Eos (auto)    0.02 10^3/uL L 10^3/uL





    (0.03-0.40) 


 


Absolute Basos (auto)    0.03 10^3/uL 10^3/uL





    (0.02-0.10) 


 


Absolute Nucleated RBC    0.00 10^3/uL 10^3/uL





    (0-0.01) 


 


Immature Gran %    0.2 % %





    (0.0-1.1) 


 


Immature Gran #    0.02 10^3/uL 10^3/uL





    (0.00-0.10) 


 


Sodium  144 mEq/L mEq/L  





   (135-145)  


 


Potassium  4.7 mEq/L mEq/L  





   (3.5-5.2)  


 


Chloride  113 mEq/L H mEq/L  





   ()  


 


Carbon Dioxide  15 mEq/l L mEq/l  





   (22-31)  


 


Anion Gap  16 mEq/L H mEq/L  





   (6-14)  


 


BUN  12 mg/dL mg/dL  





   (7-23)  


 


Creatinine  0.6 mg/dL mg/dL  





   (0.6-1.0)  


 


Estimated GFR  Not Reported   





   


 


Glucose  90 mg/dL mg/dL  





   ()  


 


Calcium  9.1 mg/dL mg/dL  





   (8.5-10.4)  


 


Ethyl Alcohol  275 mg/dL H mg/dL  





   (0-10)  











Medications Given: 





 








Discontinued Medications





Sodium Chloride (Ns)  1,000 mls @ 0 mls/hr IV EDNOW ONE; Wide Open


   PRN Reason: Protocol


   Stop: 04/20/19 02:43


   Last Admin: 04/20/19 06:02 Dose:  Not Given


Ondansetron HCl (Zofran)  4 mg IVP EDNOW ONE


   Stop: 04/20/19 02:43


   Last Admin: 04/20/19 06:02 Dose:  Not Given








Departure





- Departure


Disposition: Home, Routine, Self-Care


Clinical Impression: 


 Alcoholic intoxication





Condition: Good


Instructions:  Alcohol Intoxication (ED), Abuse of Alcohol (ED)


Referrals: 


Loraine Hurtado MD [Medical Doctor] - As per Instructions 79 y/o female admitted for progressive symptomatic anemia, weakness, and SOB, noted to have + guaiac stool   Hx of afib on xarelto (last dose was Monday), CHF, ulcers.     Impression:   Symptomatic anemia of unknown etiology.  Do not feel as though pt is acutely bleeding, this likely has been going on for several weeks as she progressively became weak and SOB without any obvious GIB.  This was probably all exacerbated by xarelto.  Will need to r/o AVMs vs. erosions vs. ulcer, doubt diverticular bleed.   + guaiac stool     Plan:  Will advance diet now.   D/c IV PPI and start PO.   H/H stable, s/p x2 units-trend.   No obvious rectal bleeding at this time.   Continue to monitor patient and trend h/h.  After long discussion with pt and daughter will plan for inpatient vs. outpatient EGD and colonoscopy on Monday pending above unless acute gib is noted.   Pt is now scheduled for outpatient endoscopic work up with Dr. Theodore.   If remains stable, and discharged, will check h/h on Friday.     Answered all daughters questions and if further questions came up, provided our number from the pratice.   Discussed in length with nurses, Dr. Juarez, Dr. Gilliland/Aleshia, Dr. Cerda, daughter, and pt.

## 2019-07-03 NOTE — CONSULT NOTE ADULT - SUBJECTIVE AND OBJECTIVE BOX
Patient is a 78y old  Female who presents with a chief complaint of complain of weakness and sob (03 Jul 2019 07:21)    HPI:  77 yo Female with a PMHx of CHF, HTN, A-fib on Xarelto presents to ED with daughter with complain of generalized weakness and Shortness of breath x1 month. Patient notes that walking exacerbates shortness of breath; can only take 1-2 steps. Patient was sent by MD Cerda after having an hgb of 7. Patient states she has not needed a blood transfusion in the past. Daughter at bedside reports that pt has recently been bruising extremely easily. She Denies Chest, abd pain, blood in vomit, headache, nausea/vomiting. Patient is Guaiac + positive in ED. She has h/o gastric ulceration in the past. (02 Jul 2019 16:23)    7/3/2019-  Saw patient this morning.   Pt/daughter report being upset and frustrated as EGD and colonoscopy is not being done today.   Discussed in length about prep, as she was not prepped for colonoscopy and we prefer to do both at the same time.    Currently no complaints besides feeling weak because she has not eaten all day.   No rectal bleeding or melena.   Denies any further SOB, rectal bleeding.   Was scheduled for endoscopic w/u with Dr. Theodore but cancelled.      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  No fevers or chills  RESPIRATORY: No cough, wheezing, hemoptysis; No shortness of breath  CARDIOVASCULAR: No chest pain or palpitations  GASTROINTESTINAL: Per HPI.   GENITOURINARY: No dysuria, frequency or hematuria  NEUROLOGICAL: No numbness or weakness  SKIN: No itching, burning, rashes, or lesions   PSYCH: Normal mood and affect  All other review of systems is negative unless indicated above.  Vital Signs Last 24 Hrs  T(C): 36.9 (03 Jul 2019 09:33), Max: 37.2 (03 Jul 2019 00:44)  T(F): 98.4 (03 Jul 2019 09:33), Max: 98.9 (03 Jul 2019 00:44)  HR: 110 (03 Jul 2019 12:00) (78 - 127)  BP: 121/78 (03 Jul 2019 11:00) (101/62 - 156/21)  BP(mean): 88 (03 Jul 2019 11:00) (57 - 88)  RR: 21 (03 Jul 2019 12:00) (14 - 29)  SpO2: 97% (03 Jul 2019 12:00) (95% - 100%)    PHYSICAL EXAM:  Constitutional: Elderly female in NAD.   HEENT: MMM  Neck: No LAD  Respiratory: CTAB  Cardiovascular: S1 and S2, RRR, no M/G/R  Gastrointestinal: BS+, soft, NT/ND  Extremities: No peripheral edema  Vascular: 2+ peripheral pulses  Neurological: A/O x 3, no focal deficits  Psychiatric: Normal mood, normal affect  Skin: No rashes    LABS:                        9.2    5.66  )-----------( 239      ( 03 Jul 2019 07:15 )             30.8     07-02    143  |  109<H>  |  22  ----------------------------<  85  3.6   |  24  |  0.64    Ca    8.5      02 Jul 2019 15:26  Mg     2.3     07-02    TPro  6.6  /  Alb  3.1<L>  /  TBili  0.4  /  DBili  x   /  AST  10<L>  /  ALT  18  /  AlkPhos  54  07-02    PT/INR - ( 02 Jul 2019 13:31 )   PT: 17.2 sec;   INR: 1.53 ratio         PTT - ( 02 Jul 2019 13:31 )  PTT:28.0 sec  LIVER FUNCTIONS - ( 02 Jul 2019 15:26 )  Alb: 3.1 g/dL / Pro: 6.6 gm/dL / ALK PHOS: 54 U/L / ALT: 18 U/L / AST: 10 U/L / GGT: x             RADIOLOGY & ADDITIONAL STUDIES:

## 2019-07-03 NOTE — PROVIDER CONTACT NOTE (OTHER) - SITUATION
left a voice message with office.  Office is closed until Friday 7/5/19.Stated that patient is in [facility].

## 2019-07-04 ENCOUNTER — TRANSCRIPTION ENCOUNTER (OUTPATIENT)
Age: 79
End: 2019-07-04

## 2019-07-04 VITALS — TEMPERATURE: 98 F

## 2019-07-04 LAB
HCT VFR BLD CALC: 31.2 % — LOW (ref 34.5–45)
HGB BLD-MCNC: 9.3 G/DL — LOW (ref 11.5–15.5)
MCHC RBC-ENTMCNC: 25 PG — LOW (ref 27–34)
MCHC RBC-ENTMCNC: 29.8 GM/DL — LOW (ref 32–36)
MCV RBC AUTO: 83.9 FL — SIGNIFICANT CHANGE UP (ref 80–100)
PLATELET # BLD AUTO: 247 K/UL — SIGNIFICANT CHANGE UP (ref 150–400)
RBC # BLD: 3.72 M/UL — LOW (ref 3.8–5.2)
RBC # FLD: 17 % — HIGH (ref 10.3–14.5)
WBC # BLD: 7.7 K/UL — SIGNIFICANT CHANGE UP (ref 3.8–10.5)
WBC # FLD AUTO: 7.7 K/UL — SIGNIFICANT CHANGE UP (ref 3.8–10.5)

## 2019-07-04 RX ORDER — DILTIAZEM HCL 120 MG
180 CAPSULE, EXT RELEASE 24 HR ORAL DAILY
Refills: 0 | Status: DISCONTINUED | OUTPATIENT
Start: 2019-07-04 | End: 2019-07-04

## 2019-07-04 RX ADMIN — Medication 180 MILLIGRAM(S): at 09:05

## 2019-07-04 RX ADMIN — PANTOPRAZOLE SODIUM 40 MILLIGRAM(S): 20 TABLET, DELAYED RELEASE ORAL at 06:22

## 2019-07-04 NOTE — DISCHARGE NOTE NURSING/CASE MANAGEMENT/SOCIAL WORK - NSDCDPATPORTLINK_GEN_ALL_CORE
You can access the [facility] Patient Portal, offered by [facility], by registering with the following website: [email or web address]

## 2019-07-04 NOTE — PROGRESS NOTE ADULT - ASSESSMENT
78 F with HTN pAF on xarelto for evaluation of symptomatic anemia      AF- hold xarelto for symptomatic anemia-  rate controlled with cardizem    s/p transfusion 2 units PRBC   recommend  GI evaluation for EGD and colonoscopy to determine source of bleeding - no cardiac contraindication to EGD, colonoscopy    tele monitering , supportive care , PPI    NPO for possible scope
78 F with HTN pAF on xarelto for evaluation of symptomatic anemia      AF- hold xarelto for symptomatic anemia-  rate controlled with cardizem ( Cardizem hold parameter  SBP < 90      No cardiac contraindication to discharge     recommend holding xarelto/asa for DC-- discussed risk and benefits of holding with patient and daughter    recommend holding her valsartan at this time as blood pressure is low, and to resume a 1/2 pill (home pill is valsartan 160/ HCTZ 25)  if  home SBP> 140    recommended lasix 1 dose tomorrow

## 2019-07-04 NOTE — PROGRESS NOTE ADULT - SUBJECTIVE AND OBJECTIVE BOX
CHIEF COMPLAINT: Patient is a 78y old  Female who presents with a chief complaint of complain of weakness and sob (02 Jul 2019 16:23)      HPI:  77 yo Female with a PMHx of CHF, HTN, A-fib on Xarelto presents to ED with daughter with complain of generalized weakness and Shortness of breath x1 month. Patient notes that walking exacerbates shortness of breath; can only take 1-2 steps. Patient was sent by MD Cerda after having an hgb of 7. Patient states she has not needed a blood transfusion in the past. Daughter at bedside reports that pt has recently been bruising extremely easily. She Denies Chest, abd pain, blood in vomit, headache, nausea/vomiting. Patient is Guaiac + positive in ED. She has h/o gastric ulceration in the past. 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 No nausea, vomiting, or hematemesis; No diarrhea or constipation. No melena or hematochezia.  GENITOURINARY: No dysuria, frequency or hematuria  NEUROLOGICAL: No numbness or weakness  SKIN: No itching, burning, rashes, or lesions   All other review of systems is negative unless indicated above    ICU Vital Signs Last 24 Hrs  T(C): 36.8 (03 Jul 2019 06:43), Max: 37.2 (03 Jul 2019 00:44)  T(F): 98.3 (03 Jul 2019 06:43), Max: 98.9 (03 Jul 2019 00:44)  HR: 83 (03 Jul 2019 07:00) (67 - 96)  BP: 119/56 (03 Jul 2019 07:00) (101/62 - 141/55)  BP(mean): 71 (03 Jul 2019 07:00) (57 - 85)  ABP: --  ABP(mean): --  RR: 18 (03 Jul 2019 07:00) (14 - 23)  SpO2: 97% (03 Jul 2019 07:00) (95% - 100%)            PHYSICAL EXAM:   Constitutional: NAD, awake and alert, well-developed  HEENT: PERR, EOMI, Normal Hearing, MMM  Neck: Soft and supple, No LAD, No JVD  Respiratory: Breath sounds are clear bilaterally, No wheezing, rales or rhonchi  Cardiovascular: S1 and S2, regular rate and rhythm, no Murmurs, gallops or rubs  Gastrointestinal: Bowel Sounds present, soft, nontender, nondistended, no guarding, no rebound  Extremities: No peripheral edema  Vascular: 2+ peripheral pulses  Neurological: A/O x 3, no focal deficits  Musculoskeletal: 5/5 strength b/l upper and lower extremities  Skin: No rashes    MEDICATIONS  (STANDING):  dextrose 5% + sodium chloride 0.45%. 1000 milliLiter(s) (60 mL/Hr) IV Continuous <Continuous>  diltiazem    milliGRAM(s) Oral daily  fluticasone propionate 50 MICROgram(s)/spray Nasal Spray 1 Spray(s) Both Nostrils daily  multivitamin 1 Tablet(s) Oral daily  pantoprazole Infusion 8 mG/Hr (10 mL/Hr) IV Continuous <Continuous>        LABS: All Labs Reviewed:                          8.2    x     )-----------( x        ( 02 Jul 2019 21:55 )             27.7   07-02    143  |  109<H>  |  22  ----------------------------<  85  3.6   |  24  |  0.64    Ca    8.5      02 Jul 2019 15:26  Mg     2.3     07-02    TPro  6.6  /  Alb  3.1<L>  /  TBili  0.4  /  DBili  x   /  AST  10<L>  /  ALT  18  /  AlkPhos  54  07-02       PTT - ( 02 Jul 2019 13:31 )  PTT:28.0 sec  CARDIAC MARKERS ( 02 Jul 2019 15:26 )  <0.015 ng/mL / x     / 37 U/L / x     / x          Serum Pro-Brain Natriuretic Peptide: 1686 pg/mL (07-02 @ 15:26)
77 yo Female with a PMHx of CHF, HTN, A-fib on Xarelto presents to ED with daughter with complain of generalized weakness and Shortness of breath x1 month. Patient notes that walking exacerbates shortness of breath; can only take 1-2 steps. Patient was sent by MD Cerda after having an hgb of 7. Patient states she has not needed a blood transfusion in the past. Daughter at bedside reports that pt has recently been bruising extremely easily. She Denies Chest, abd pain, blood in vomit, headache, nausea/vomiting. Patient is Guaiac + positive in ED. She has h/o gastric ulceration in the past.       07/03/19: Patient seen and examined. Patient  and daughter were very upset in am due to not having EGD/colon today. Discussed with GI. Scheduled to have on Monday 12 noon as an out patient. Patient received 2 units of PRBCs last night. Her last HGB in 10/18 was 15.5. She lost almost 8 gms since then and was symptomatic. Discussed with patient, daughter and son at bed side in length regarding management and d/c plan. Will observe her tonight, if HGB >8.0 tomorrow can be discharged home. They agreed with the plan. Hold xarelto for now.         Vital Signs Last 24 Hrs  T(C): 36.8 (03 Jul 2019 14:24), Max: 37.2 (03 Jul 2019 00:44)  T(F): 98.2 (03 Jul 2019 14:24), Max: 98.9 (03 Jul 2019 00:44)  HR: 105 (03 Jul 2019 15:00) (78 - 127)  BP: 118/49 (03 Jul 2019 15:00) (101/62 - 156/21)  BP(mean): 68 (03 Jul 2019 15:00) (57 - 88)  RR: 19 (03 Jul 2019 15:00) (14 - 29)  SpO2: 97% (03 Jul 2019 15:00) (95% - 100%)        PHYSICAL EXAM:   · CONSTITUTIONAL: Well appearing, well nourished, awake, alert, oriented to person, place, time/situation and in no apparent distress.	  · ENMT: Airway patent, Nasal mucosa clear. Mouth with normal mucosa. Throat has no vesicles, no oropharyngeal exudates and uvula is midline.	  · EYES: Clear bilaterally, pupils equal, round and reactive to light.	  · CARDIAC: Normal rate, regular rhythm.  Heart sounds S1, S2.  No murmurs, rubs or gallops.	  · RESPIRATORY: Breath sounds clear and equal bilaterally.	  · GASTROINTESTINAL: Abdomen soft, non-tender, no guarding.	   	  · MUSCULOSKELETAL: Spine appears normal, range of motion is not limited, no muscle or joint tenderness	  · NEUROLOGICAL: Alert and oriented, no focal deficits, no motor or sensory deficits.	  · SKIN: Skin normal color for race, warm, dry and intact. No evidence of rash.	      Labs:                             9.2    5.66  )-----------( 239      ( 03 Jul 2019 07:15 )             30.8     02 Jul 2019 15:26    143    |  109    |  22     ----------------------------<  85     3.6     |  24     |  0.64     Ca    8.5        02 Jul 2019 15:26  Mg     2.3       02 Jul 2019 15:26    TPro  6.6    /  Alb  3.1    /  TBili  0.4    /  DBili  x      /  AST  10     /  ALT  18     /  AlkPhos  54     02 Jul 2019 15:26    LIVER FUNCTIONS - ( 02 Jul 2019 15:26 )  Alb: 3.1 g/dL / Pro: 6.6 gm/dL / ALK PHOS: 54 U/L / ALT: 18 U/L / AST: 10 U/L / GGT: x           PT/INR - ( 02 Jul 2019 13:31 )   PT: 17.2 sec;   INR: 1.53 ratio         PTT - ( 02 Jul 2019 13:31 )  PTT:28.0 sec  CAPILLARY BLOOD GLUCOSE        CARDIAC MARKERS ( 02 Jul 2019 15:26 )  <0.015 ng/mL / x     / 37 U/L / x     / x          MEDICATIONS  (STANDING):  diltiazem    milliGRAM(s) Oral daily  fluticasone propionate 50 MICROgram(s)/spray Nasal Spray 1 Spray(s) Both Nostrils daily  multivitamin 1 Tablet(s) Oral daily  pantoprazole    Tablet 40 milliGRAM(s) Oral before breakfast    MEDICATIONS  (PRN):  acetaminophen   Tablet .. 650 milliGRAM(s) Oral every 6 hours PRN Moderate Pain (4 - 6)          Assessment and Plan:   Assessment:  · Assessment		  77 yo female presented with Acute blood loss anemia due to GI bleeding and significant shortness of breath.     A/P:    1.  Acute Blood loss anemia  GI bleeding  Shortness of breath: within 2-3 steps  S/P 2 units of PRBCs last night  Follow H/H  Hold xarelto  Continue protonix  GI consult appreciated  Possible EGD/colon on Monday as an outpatient with Dr Theodore    2.  HTN  -follow BP and adjust BP meds as needed    3.  h/o A fib  -HR controlled  -continue diltiazem  -hold Xarelto  -Dr Cerda consult appreciated     4.  SCD for DVT ppx    5.  Code status: Full code.       Possible d/c home tomorrow once HGB >8.0    Transfer to floor.
CHIEF COMPLAINT: Patient is a 78y old  Female who presents with a chief complaint of complain of weakness and sob (02 Jul 2019 16:23)      HPI:  77 yo Female with a PMHx of CHF, HTN, A-fib on Xarelto presents to ED with daughter with complain of generalized weakness and Shortness of breath x1 month. Patient notes that walking exacerbates shortness of breath; can only take 1-2 steps. Patient was sent by MD Cerda after having an hgb of 7. Patient states she has not needed a blood transfusion in the past. Daughter at bedside reports that pt has recently been bruising extremely easily. She Denies Chest, abd pain, blood in vomit, headache, nausea/vomiting. Patient is Guaiac + positive in ED. She has h/o gastric ulceration in the past. 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 No nausea, vomiting, or hematemesis; No diarrhea or constipation. No melena or hematochezia.  GENITOURINARY: No dysuria, frequency or hematuria  NEUROLOGICAL: No numbness or weakness  SKIN: No itching, burning, rashes, or lesions   All other review of systems is negative unless indicated above    ICU Vital Signs Last 24 Hrs  T(C): 37.5 (04 Jul 2019 06:48), Max: 37.5 (04 Jul 2019 06:48)  T(F): 99.5 (04 Jul 2019 06:48), Max: 99.5 (04 Jul 2019 06:48)  HR: 89 (04 Jul 2019 08:00) (88 - 110)  BP: 113/61 (04 Jul 2019 08:00) (93/70 - 156/21)  BP(mean): 71 (04 Jul 2019 08:00) (54 - 92)  ABP: --  ABP(mean): --  RR: 22 (04 Jul 2019 08:00) (15 - 27)  SpO2: 95% (04 Jul 2019 08:00) (94% - 100%)        PHYSICAL EXAM:   Constitutional: NAD, awake and alert, well-developed  HEENT: PERR, EOMI, Normal Hearing, MMM  Neck: Soft and supple, No LAD, No JVD  Respiratory: Breath sounds are clear bilaterally, No wheezing, rales or rhonchi  Cardiovascular: S1 and S2, regular rate and rhythm, no Murmurs, gallops or rubs  Gastrointestinal: Bowel Sounds present, soft, nontender, nondistended, no guarding, no rebound  Extremities: No peripheral edema  Vascular: 2+ peripheral pulses  Neurological: A/O x 3, no focal deficits  Musculoskeletal: 5/5 strength b/l upper and lower extremities  Skin: No rashes    MEDICATIONS  (STANDING):  diltiazem    milliGRAM(s) Oral daily  fluticasone propionate 50 MICROgram(s)/spray Nasal Spray 1 Spray(s) Both Nostrils daily  multivitamin 1 Tablet(s) Oral daily  pantoprazole    Tablet 40 milliGRAM(s) Oral before breakfast      LABS: All Labs Reviewed:                          9.3    7.70  )-----------( 247      ( 04 Jul 2019 06:47 )             31.2   07-02    143  |  109<H>  |  22  ----------------------------<  85  3.6   |  24  |  0.64    Ca    8.5      02 Jul 2019 15:26  Mg     2.3     07-02    TPro  6.6  /  Alb  3.1<L>  /  TBili  0.4  /  DBili  x   /  AST  10<L>  /  ALT  18  /  AlkPhos  54  07-02    PTT - ( 02 Jul 2019 13:31 )  PTT:28.0 sec  CARDIAC MARKERS ( 02 Jul 2019 15:26 )  <0.015 ng/mL / x     / 37 U/L / x     / x          Serum Pro-Brain Natriuretic Peptide: 1686 pg/mL (07-02 @ 15:26)

## 2019-07-04 NOTE — DISCHARGE NOTE PROVIDER - HOSPITAL COURSE
79 yo Female with a PMHx of CHF, HTN, A-fib on Xarelto presents to ED with daughter with complain of generalized weakness and Shortness of breath x1 month. Patient notes that walking exacerbates shortness of breath; can only take 1-2 steps. Patient was sent by MD Cerda after having an hgb of 7. Patient states she has not needed a blood transfusion in the past. Daughter at bedside reports that pt has recently been bruising extremely easily. She was found to be guiac positive in ED and admitted with anemia d/t possible GI loss. She was transfused with prbcs and her h/h remained stable after transfusion. Her xarelto and Asa were held. She was seen by GI and is scheduled for outpt EGD/colon. She is ambulating without difficulty and will be discharged home today. She will follow up with Dr. Cerda after EGD/Colon re: further advise on meds.         Vital Signs Last 24 Hrs    T(C): 37.5 (04 Jul 2019 06:48), Max: 37.5 (04 Jul 2019 06:48)    T(F): 99.5 (04 Jul 2019 06:48), Max: 99.5 (04 Jul 2019 06:48)    HR: 89 (04 Jul 2019 08:00) (88 - 110)    BP: 113/61 (04 Jul 2019 08:00) (93/70 - 134/81)    BP(mean): 71 (04 Jul 2019 08:00) (54 - 92)    RR: 22 (04 Jul 2019 08:00) (15 - 25)    SpO2: 95% (04 Jul 2019 08:00) (94% - 100%)            PHYSICAL EXAM:        GENERAL: Comfortable, no acute distress    HEAD:  Atraumatic, Normocephalic    EYES: EOMI, PERRLA    HEENT: Moist mucous membranes    NECK: Supple, No JVD    NERVOUS SYSTEM:  Alert & Oriented X3, Motor Strength 5/5 B/L upper and lower extremities    CHEST/LUNG: Clear to auscultation bilaterally    HEART: Irregularly irregular.    ABDOMEN: Soft, Nontender, Nondistended; Bowel sounds present    GENITOURINARY- Voiding, no palpable bladder    EXTREMITIES:  No clubbing, cyanosis, or edema    MUSCULOSKELETAL- No muscle tenderness, No joint tenderness    SKIN-no rash                LABS:                            9.3      7.70  )-----------( 247                     31.2         07-02        143  |  109<H>  |  22    ----------------------------<  85    3.6   |  24  |  0.64        Ca    8.5      02 Jul 2019 15:26    Mg     2.3     07-02    TPro  6.6  /  Alb  3.1<L>  /  TBili  0.4  /  DBili  x   /  AST  10<L>  /  ALT  18  /  AlkPhos  54  07-02    PT/INR - ( 02 Jul 2019 13:31 )   PT: 17.2 sec;   INR: 1.53 ratio          FINAL DIAGNOSIS:    1. Acute blood loss anemia/Iron deficiency anemia likely d/t recent gi bleed    2. HTN    3. Atrial fibrillation    4. Chronic diastolic CHF.        Time taken for dc 52 min    plan d/w patient/daughter at bedside.     left message for pcp re: dc.

## 2019-07-04 NOTE — DISCHARGE NOTE PROVIDER - NSDCCPCAREPLAN_GEN_ALL_CORE_FT
PRINCIPAL DISCHARGE DIAGNOSIS  Diagnosis: GIB (gastrointestinal bleeding)  Assessment and Plan of Treatment: further evaluation  follow up with DR. Theodore on monday at scheduled appt for EGD/Colon  do not take aspirin/xarelto until advised to resume.   avoid medicines like naprosyn/ibuprofen (NSAIDS) for now  Try to eat iron rich foods.

## 2019-07-08 ENCOUNTER — APPOINTMENT (OUTPATIENT)
Dept: GASTROENTEROLOGY | Facility: AMBULATORY MEDICAL SERVICES | Age: 79
End: 2019-07-08
Payer: MEDICARE

## 2019-07-08 ENCOUNTER — RESULT REVIEW (OUTPATIENT)
Age: 79
End: 2019-07-08

## 2019-07-08 PROCEDURE — 43239 EGD BIOPSY SINGLE/MULTIPLE: CPT

## 2019-07-08 PROCEDURE — 45380 COLONOSCOPY AND BIOPSY: CPT

## 2019-07-09 DIAGNOSIS — I48.0 PAROXYSMAL ATRIAL FIBRILLATION: ICD-10-CM

## 2019-07-09 DIAGNOSIS — I11.0 HYPERTENSIVE HEART DISEASE WITH HEART FAILURE: ICD-10-CM

## 2019-07-09 DIAGNOSIS — R06.02 SHORTNESS OF BREATH: ICD-10-CM

## 2019-07-09 DIAGNOSIS — D62 ACUTE POSTHEMORRHAGIC ANEMIA: ICD-10-CM

## 2019-07-09 DIAGNOSIS — K92.2 GASTROINTESTINAL HEMORRHAGE, UNSPECIFIED: ICD-10-CM

## 2019-07-09 DIAGNOSIS — I50.32 CHRONIC DIASTOLIC (CONGESTIVE) HEART FAILURE: ICD-10-CM

## 2019-07-09 DIAGNOSIS — Z79.01 LONG TERM (CURRENT) USE OF ANTICOAGULANTS: ICD-10-CM

## 2019-07-09 DIAGNOSIS — Z79.82 LONG TERM (CURRENT) USE OF ASPIRIN: ICD-10-CM

## 2019-07-09 DIAGNOSIS — K21.9 GASTRO-ESOPHAGEAL REFLUX DISEASE WITHOUT ESOPHAGITIS: ICD-10-CM

## 2019-07-11 ENCOUNTER — INPATIENT (INPATIENT)
Facility: HOSPITAL | Age: 79
LOS: 0 days | Discharge: ANOTHER HOSPITAL | End: 2019-07-12
Attending: INTERNAL MEDICINE | Admitting: INTERNAL MEDICINE
Payer: MEDICARE

## 2019-07-11 VITALS — HEIGHT: 61 IN | WEIGHT: 195.11 LBS

## 2019-07-11 DIAGNOSIS — E89.0 POSTPROCEDURAL HYPOTHYROIDISM: Chronic | ICD-10-CM

## 2019-07-11 DIAGNOSIS — I10 ESSENTIAL (PRIMARY) HYPERTENSION: ICD-10-CM

## 2019-07-11 DIAGNOSIS — I50.9 HEART FAILURE, UNSPECIFIED: ICD-10-CM

## 2019-07-11 DIAGNOSIS — I48.0 PAROXYSMAL ATRIAL FIBRILLATION: ICD-10-CM

## 2019-07-11 DIAGNOSIS — Z98.890 OTHER SPECIFIED POSTPROCEDURAL STATES: Chronic | ICD-10-CM

## 2019-07-11 DIAGNOSIS — J96.01 ACUTE RESPIRATORY FAILURE WITH HYPOXIA: ICD-10-CM

## 2019-07-11 LAB
ADD ON TEST-SPECIMEN IN LAB: SIGNIFICANT CHANGE UP
ALBUMIN SERPL ELPH-MCNC: 2.4 G/DL — LOW (ref 3.3–5)
ALBUMIN SERPL ELPH-MCNC: 2.9 G/DL — LOW (ref 3.3–5)
ALBUMIN SERPL ELPH-MCNC: 2.9 G/DL — LOW (ref 3.3–5)
ALP SERPL-CCNC: 57 U/L — SIGNIFICANT CHANGE UP (ref 40–120)
ALP SERPL-CCNC: 70 U/L — SIGNIFICANT CHANGE UP (ref 40–120)
ALP SERPL-CCNC: 71 U/L — SIGNIFICANT CHANGE UP (ref 40–120)
ALT FLD-CCNC: 61 U/L — SIGNIFICANT CHANGE UP (ref 12–78)
ALT FLD-CCNC: 66 U/L — SIGNIFICANT CHANGE UP (ref 12–78)
ALT FLD-CCNC: 978 U/L — HIGH (ref 12–78)
ANION GAP SERPL CALC-SCNC: 12 MMOL/L — SIGNIFICANT CHANGE UP (ref 5–17)
ANION GAP SERPL CALC-SCNC: 13 MMOL/L — SIGNIFICANT CHANGE UP (ref 5–17)
ANION GAP SERPL CALC-SCNC: 15 MMOL/L — SIGNIFICANT CHANGE UP (ref 5–17)
APPEARANCE UR: CLEAR — SIGNIFICANT CHANGE UP
APTT BLD: 22.5 SEC — LOW (ref 27.5–36.3)
AST SERPL-CCNC: 1189 U/L — HIGH (ref 15–37)
AST SERPL-CCNC: 69 U/L — HIGH (ref 15–37)
AST SERPL-CCNC: 99 U/L — HIGH (ref 15–37)
BACTERIA # UR AUTO: ABNORMAL
BASE EXCESS BLDA CALC-SCNC: -5.3 MMOL/L — LOW (ref -2–2)
BILIRUB SERPL-MCNC: 0.3 MG/DL — SIGNIFICANT CHANGE UP (ref 0.2–1.2)
BILIRUB SERPL-MCNC: 0.3 MG/DL — SIGNIFICANT CHANGE UP (ref 0.2–1.2)
BILIRUB SERPL-MCNC: 0.8 MG/DL — SIGNIFICANT CHANGE UP (ref 0.2–1.2)
BILIRUB UR-MCNC: NEGATIVE — SIGNIFICANT CHANGE UP
BLOOD GAS COMMENTS ARTERIAL: SIGNIFICANT CHANGE UP
BUN SERPL-MCNC: 18 MG/DL — SIGNIFICANT CHANGE UP (ref 7–23)
BUN SERPL-MCNC: 18 MG/DL — SIGNIFICANT CHANGE UP (ref 7–23)
BUN SERPL-MCNC: 22 MG/DL — SIGNIFICANT CHANGE UP (ref 7–23)
CALCIUM SERPL-MCNC: 7.8 MG/DL — LOW (ref 8.5–10.1)
CALCIUM SERPL-MCNC: 8.2 MG/DL — LOW (ref 8.5–10.1)
CALCIUM SERPL-MCNC: 8.3 MG/DL — LOW (ref 8.5–10.1)
CHLORIDE SERPL-SCNC: 101 MMOL/L — SIGNIFICANT CHANGE UP (ref 96–108)
CHLORIDE SERPL-SCNC: 104 MMOL/L — SIGNIFICANT CHANGE UP (ref 96–108)
CHLORIDE SERPL-SCNC: 107 MMOL/L — SIGNIFICANT CHANGE UP (ref 96–108)
CK SERPL-CCNC: 42 U/L — SIGNIFICANT CHANGE UP (ref 26–192)
CK SERPL-CCNC: 50 U/L — SIGNIFICANT CHANGE UP (ref 26–192)
CO2 SERPL-SCNC: 19 MMOL/L — LOW (ref 22–31)
COLOR SPEC: YELLOW — SIGNIFICANT CHANGE UP
CREAT SERPL-MCNC: 0.85 MG/DL — SIGNIFICANT CHANGE UP (ref 0.5–1.3)
CREAT SERPL-MCNC: 1.04 MG/DL — SIGNIFICANT CHANGE UP (ref 0.5–1.3)
CREAT SERPL-MCNC: 1.08 MG/DL — SIGNIFICANT CHANGE UP (ref 0.5–1.3)
DIFF PNL FLD: NEGATIVE — SIGNIFICANT CHANGE UP
EPI CELLS # UR: SIGNIFICANT CHANGE UP
ERYTHROCYTE [SEDIMENTATION RATE] IN BLOOD: 9 MM/HR — SIGNIFICANT CHANGE UP (ref 0–20)
GAS PNL BLDA: SIGNIFICANT CHANGE UP
GLUCOSE BLDC GLUCOMTR-MCNC: 151 MG/DL — HIGH (ref 70–99)
GLUCOSE BLDC GLUCOMTR-MCNC: 153 MG/DL — HIGH (ref 70–99)
GLUCOSE BLDC GLUCOMTR-MCNC: 224 MG/DL — HIGH (ref 70–99)
GLUCOSE SERPL-MCNC: 135 MG/DL — HIGH (ref 70–99)
GLUCOSE SERPL-MCNC: 175 MG/DL — HIGH (ref 70–99)
GLUCOSE SERPL-MCNC: 360 MG/DL — HIGH (ref 70–99)
GLUCOSE UR QL: NEGATIVE MG/DL — SIGNIFICANT CHANGE UP
HCO3 BLDA-SCNC: 19 MMOL/L — LOW (ref 21–29)
HCT VFR BLD CALC: 37.9 % — SIGNIFICANT CHANGE UP (ref 34.5–45)
HGB BLD-MCNC: 10.5 G/DL — LOW (ref 11.5–15.5)
INR BLD: 0.99 RATIO — SIGNIFICANT CHANGE UP (ref 0.88–1.16)
KETONES UR-MCNC: NEGATIVE — SIGNIFICANT CHANGE UP
LACTATE SERPL-SCNC: 4.2 MMOL/L — CRITICAL HIGH (ref 0.7–2)
LACTATE SERPL-SCNC: 6.1 MMOL/L — CRITICAL HIGH (ref 0.7–2)
LACTATE SERPL-SCNC: 9.8 MMOL/L — CRITICAL HIGH (ref 0.7–2)
LEUKOCYTE ESTERASE UR-ACNC: NEGATIVE — SIGNIFICANT CHANGE UP
LIDOCAIN IGE QN: 255 U/L — SIGNIFICANT CHANGE UP (ref 73–393)
MAGNESIUM SERPL-MCNC: 2.6 MG/DL — SIGNIFICANT CHANGE UP (ref 1.6–2.6)
MCHC RBC-ENTMCNC: 24.6 PG — LOW (ref 27–34)
MCHC RBC-ENTMCNC: 27.7 GM/DL — LOW (ref 32–36)
MCV RBC AUTO: 88.8 FL — SIGNIFICANT CHANGE UP (ref 80–100)
NITRITE UR-MCNC: NEGATIVE — SIGNIFICANT CHANGE UP
NT-PROBNP SERPL-SCNC: 3185 PG/ML — HIGH (ref 0–450)
PCO2 BLDA: 34 MMHG — SIGNIFICANT CHANGE UP (ref 32–46)
PH BLDA: 7.37 — SIGNIFICANT CHANGE UP (ref 7.35–7.45)
PH UR: 5 — SIGNIFICANT CHANGE UP (ref 5–8)
PLATELET # BLD AUTO: 422 K/UL — HIGH (ref 150–400)
PO2 BLDA: 181 MMHG — HIGH (ref 74–108)
POTASSIUM SERPL-MCNC: 4.1 MMOL/L — SIGNIFICANT CHANGE UP (ref 3.5–5.3)
POTASSIUM SERPL-MCNC: 4.6 MMOL/L — SIGNIFICANT CHANGE UP (ref 3.5–5.3)
POTASSIUM SERPL-MCNC: 4.8 MMOL/L — SIGNIFICANT CHANGE UP (ref 3.5–5.3)
POTASSIUM SERPL-SCNC: 4.1 MMOL/L — SIGNIFICANT CHANGE UP (ref 3.5–5.3)
POTASSIUM SERPL-SCNC: 4.6 MMOL/L — SIGNIFICANT CHANGE UP (ref 3.5–5.3)
POTASSIUM SERPL-SCNC: 4.8 MMOL/L — SIGNIFICANT CHANGE UP (ref 3.5–5.3)
PROT SERPL-MCNC: 5.6 GM/DL — LOW (ref 6–8.3)
PROT SERPL-MCNC: 6.6 GM/DL — SIGNIFICANT CHANGE UP (ref 6–8.3)
PROT SERPL-MCNC: 6.7 GM/DL — SIGNIFICANT CHANGE UP (ref 6–8.3)
PROT UR-MCNC: 30 MG/DL
PROTHROM AB SERPL-ACNC: 11 SEC — SIGNIFICANT CHANGE UP (ref 10–12.9)
RBC # BLD: 4.27 M/UL — SIGNIFICANT CHANGE UP (ref 3.8–5.2)
RBC # FLD: 16.8 % — HIGH (ref 10.3–14.5)
RBC CASTS # UR COMP ASSIST: SIGNIFICANT CHANGE UP /HPF (ref 0–4)
SAO2 % BLDA: 98 % — HIGH (ref 92–96)
SODIUM SERPL-SCNC: 132 MMOL/L — LOW (ref 135–145)
SODIUM SERPL-SCNC: 136 MMOL/L — SIGNIFICANT CHANGE UP (ref 135–145)
SODIUM SERPL-SCNC: 141 MMOL/L — SIGNIFICANT CHANGE UP (ref 135–145)
SP GR SPEC: 1.01 — SIGNIFICANT CHANGE UP (ref 1.01–1.02)
TROPONIN I SERPL-MCNC: 0.03 NG/ML — SIGNIFICANT CHANGE UP (ref 0.01–0.04)
TROPONIN I SERPL-MCNC: 0.21 NG/ML — HIGH (ref 0.01–0.04)
TROPONIN I SERPL-MCNC: 0.27 NG/ML — HIGH (ref 0.01–0.04)
UROBILINOGEN FLD QL: NEGATIVE MG/DL — SIGNIFICANT CHANGE UP
WBC # BLD: 16.48 K/UL — HIGH (ref 3.8–10.5)
WBC # FLD AUTO: 16.48 K/UL — HIGH (ref 3.8–10.5)
WBC UR QL: SIGNIFICANT CHANGE UP

## 2019-07-11 PROCEDURE — 74177 CT ABD & PELVIS W/CONTRAST: CPT | Mod: 26

## 2019-07-11 PROCEDURE — 71275 CT ANGIOGRAPHY CHEST: CPT | Mod: 26

## 2019-07-11 PROCEDURE — 93010 ELECTROCARDIOGRAM REPORT: CPT | Mod: 76

## 2019-07-11 PROCEDURE — 93306 TTE W/DOPPLER COMPLETE: CPT | Mod: 26

## 2019-07-11 PROCEDURE — 71045 X-RAY EXAM CHEST 1 VIEW: CPT | Mod: 26

## 2019-07-11 PROCEDURE — 99291 CRITICAL CARE FIRST HOUR: CPT

## 2019-07-11 RX ORDER — METOPROLOL TARTRATE 50 MG
2.5 TABLET ORAL EVERY 6 HOURS
Refills: 0 | Status: DISCONTINUED | OUTPATIENT
Start: 2019-07-11 | End: 2019-07-12

## 2019-07-11 RX ORDER — ASCORBIC ACID 60 MG
500 TABLET,CHEWABLE ORAL DAILY
Refills: 0 | Status: DISCONTINUED | OUTPATIENT
Start: 2019-07-11 | End: 2019-07-11

## 2019-07-11 RX ORDER — ATORVASTATIN CALCIUM 80 MG/1
20 TABLET, FILM COATED ORAL AT BEDTIME
Refills: 0 | Status: DISCONTINUED | OUTPATIENT
Start: 2019-07-11 | End: 2019-07-12

## 2019-07-11 RX ORDER — SODIUM CHLORIDE 9 MG/ML
2000 INJECTION INTRAMUSCULAR; INTRAVENOUS; SUBCUTANEOUS ONCE
Refills: 0 | Status: COMPLETED | OUTPATIENT
Start: 2019-07-11 | End: 2019-07-11

## 2019-07-11 RX ORDER — SENNA PLUS 8.6 MG/1
2 TABLET ORAL AT BEDTIME
Refills: 0 | Status: DISCONTINUED | OUTPATIENT
Start: 2019-07-11 | End: 2019-07-12

## 2019-07-11 RX ORDER — CEFEPIME 1 G/1
1000 INJECTION, POWDER, FOR SOLUTION INTRAMUSCULAR; INTRAVENOUS EVERY 8 HOURS
Refills: 0 | Status: DISCONTINUED | OUTPATIENT
Start: 2019-07-11 | End: 2019-07-11

## 2019-07-11 RX ORDER — DIGOXIN 250 MCG
0.12 TABLET ORAL ONCE
Refills: 0 | Status: COMPLETED | OUTPATIENT
Start: 2019-07-11 | End: 2019-07-11

## 2019-07-11 RX ORDER — FUROSEMIDE 40 MG
40 TABLET ORAL ONCE
Refills: 0 | Status: COMPLETED | OUTPATIENT
Start: 2019-07-11 | End: 2019-07-11

## 2019-07-11 RX ORDER — GLUCAGON INJECTION, SOLUTION 0.5 MG/.1ML
1 INJECTION, SOLUTION SUBCUTANEOUS ONCE
Refills: 0 | Status: DISCONTINUED | OUTPATIENT
Start: 2019-07-11 | End: 2019-07-12

## 2019-07-11 RX ORDER — DILTIAZEM HCL 120 MG
10 CAPSULE, EXT RELEASE 24 HR ORAL ONCE
Refills: 0 | Status: DISCONTINUED | OUTPATIENT
Start: 2019-07-11 | End: 2019-07-11

## 2019-07-11 RX ORDER — INSULIN LISPRO 100/ML
VIAL (ML) SUBCUTANEOUS
Refills: 0 | Status: DISCONTINUED | OUTPATIENT
Start: 2019-07-11 | End: 2019-07-12

## 2019-07-11 RX ORDER — DIGOXIN 250 MCG
0.12 TABLET ORAL ONCE
Refills: 0 | Status: DISCONTINUED | OUTPATIENT
Start: 2019-07-11 | End: 2019-07-11

## 2019-07-11 RX ORDER — DOCUSATE SODIUM 100 MG
100 CAPSULE ORAL THREE TIMES A DAY
Refills: 0 | Status: DISCONTINUED | OUTPATIENT
Start: 2019-07-11 | End: 2019-07-12

## 2019-07-11 RX ORDER — OXYCODONE AND ACETAMINOPHEN 5; 325 MG/1; MG/1
1 TABLET ORAL EVERY 4 HOURS
Refills: 0 | Status: DISCONTINUED | OUTPATIENT
Start: 2019-07-11 | End: 2019-07-12

## 2019-07-11 RX ORDER — ASCORBIC ACID 60 MG
500 TABLET,CHEWABLE ORAL DAILY
Refills: 0 | Status: DISCONTINUED | OUTPATIENT
Start: 2019-07-11 | End: 2019-07-12

## 2019-07-11 RX ORDER — ACETAMINOPHEN 500 MG
650 TABLET ORAL EVERY 4 HOURS
Refills: 0 | Status: DISCONTINUED | OUTPATIENT
Start: 2019-07-11 | End: 2019-07-12

## 2019-07-11 RX ORDER — SODIUM CHLORIDE 9 MG/ML
1000 INJECTION, SOLUTION INTRAVENOUS
Refills: 0 | Status: DISCONTINUED | OUTPATIENT
Start: 2019-07-11 | End: 2019-07-12

## 2019-07-11 RX ORDER — ASPIRIN/CALCIUM CARB/MAGNESIUM 324 MG
162 TABLET ORAL ONCE
Refills: 0 | Status: COMPLETED | OUTPATIENT
Start: 2019-07-11 | End: 2019-07-11

## 2019-07-11 RX ORDER — MORPHINE SULFATE 50 MG/1
2 CAPSULE, EXTENDED RELEASE ORAL ONCE
Refills: 0 | Status: DISCONTINUED | OUTPATIENT
Start: 2019-07-11 | End: 2019-07-11

## 2019-07-11 RX ORDER — METOPROLOL TARTRATE 50 MG
25 TABLET ORAL
Refills: 0 | Status: DISCONTINUED | OUTPATIENT
Start: 2019-07-11 | End: 2019-07-11

## 2019-07-11 RX ORDER — CEFEPIME 1 G/1
2000 INJECTION, POWDER, FOR SOLUTION INTRAMUSCULAR; INTRAVENOUS ONCE
Refills: 0 | Status: COMPLETED | OUTPATIENT
Start: 2019-07-11 | End: 2019-07-11

## 2019-07-11 RX ORDER — MORPHINE SULFATE 50 MG/1
2 CAPSULE, EXTENDED RELEASE ORAL EVERY 4 HOURS
Refills: 0 | Status: DISCONTINUED | OUTPATIENT
Start: 2019-07-11 | End: 2019-07-11

## 2019-07-11 RX ORDER — AMIODARONE HYDROCHLORIDE 400 MG/1
150 TABLET ORAL ONCE
Refills: 0 | Status: COMPLETED | OUTPATIENT
Start: 2019-07-11 | End: 2019-07-11

## 2019-07-11 RX ORDER — TRAMADOL HYDROCHLORIDE 50 MG/1
1 TABLET ORAL
Qty: 0 | Refills: 0 | DISCHARGE

## 2019-07-11 RX ORDER — DILTIAZEM HCL 120 MG
10 CAPSULE, EXT RELEASE 24 HR ORAL ONCE
Refills: 0 | Status: COMPLETED | OUTPATIENT
Start: 2019-07-11 | End: 2019-07-11

## 2019-07-11 RX ORDER — AMIODARONE HYDROCHLORIDE 400 MG/1
0.5 TABLET ORAL
Qty: 900 | Refills: 0 | Status: DISCONTINUED | OUTPATIENT
Start: 2019-07-11 | End: 2019-07-12

## 2019-07-11 RX ORDER — ONDANSETRON 8 MG/1
4 TABLET, FILM COATED ORAL ONCE
Refills: 0 | Status: COMPLETED | OUTPATIENT
Start: 2019-07-11 | End: 2019-07-11

## 2019-07-11 RX ORDER — ONDANSETRON 8 MG/1
4 TABLET, FILM COATED ORAL EVERY 6 HOURS
Refills: 0 | Status: DISCONTINUED | OUTPATIENT
Start: 2019-07-11 | End: 2019-07-12

## 2019-07-11 RX ORDER — FLUTICASONE PROPIONATE 50 MCG
2 SPRAY, SUSPENSION NASAL DAILY
Refills: 0 | Status: DISCONTINUED | OUTPATIENT
Start: 2019-07-11 | End: 2019-07-12

## 2019-07-11 RX ORDER — INSULIN LISPRO 100/ML
VIAL (ML) SUBCUTANEOUS AT BEDTIME
Refills: 0 | Status: DISCONTINUED | OUTPATIENT
Start: 2019-07-11 | End: 2019-07-12

## 2019-07-11 RX ORDER — NITROGLYCERIN 6.5 MG
0.5 CAPSULE, EXTENDED RELEASE ORAL EVERY 6 HOURS
Refills: 0 | Status: DISCONTINUED | OUTPATIENT
Start: 2019-07-11 | End: 2019-07-12

## 2019-07-11 RX ORDER — MAGNESIUM OXIDE 400 MG ORAL TABLET 241.3 MG
400 TABLET ORAL DAILY
Refills: 0 | Status: DISCONTINUED | OUTPATIENT
Start: 2019-07-11 | End: 2019-07-12

## 2019-07-11 RX ORDER — PANTOPRAZOLE SODIUM 20 MG/1
40 TABLET, DELAYED RELEASE ORAL
Refills: 0 | Status: DISCONTINUED | OUTPATIENT
Start: 2019-07-11 | End: 2019-07-12

## 2019-07-11 RX ORDER — CEFEPIME 1 G/1
1000 INJECTION, POWDER, FOR SOLUTION INTRAMUSCULAR; INTRAVENOUS EVERY 12 HOURS
Refills: 0 | Status: DISCONTINUED | OUTPATIENT
Start: 2019-07-11 | End: 2019-07-12

## 2019-07-11 RX ORDER — VANCOMYCIN HCL 1 G
1250 VIAL (EA) INTRAVENOUS EVERY 12 HOURS
Refills: 0 | Status: DISCONTINUED | OUTPATIENT
Start: 2019-07-11 | End: 2019-07-11

## 2019-07-11 RX ORDER — AMIODARONE HYDROCHLORIDE 400 MG/1
1 TABLET ORAL
Qty: 900 | Refills: 0 | Status: DISCONTINUED | OUTPATIENT
Start: 2019-07-11 | End: 2019-07-12

## 2019-07-11 RX ORDER — ESCITALOPRAM OXALATE 10 MG/1
10 TABLET, FILM COATED ORAL DAILY
Refills: 0 | Status: DISCONTINUED | OUTPATIENT
Start: 2019-07-11 | End: 2019-07-12

## 2019-07-11 RX ORDER — FAMOTIDINE 10 MG/ML
20 INJECTION INTRAVENOUS ONCE
Refills: 0 | Status: COMPLETED | OUTPATIENT
Start: 2019-07-11 | End: 2019-07-11

## 2019-07-11 RX ORDER — RIVAROXABAN 15 MG-20MG
20 KIT ORAL
Refills: 0 | Status: DISCONTINUED | OUTPATIENT
Start: 2019-07-11 | End: 2019-07-12

## 2019-07-11 RX ORDER — VANCOMYCIN HCL 1 G
1250 VIAL (EA) INTRAVENOUS ONCE
Refills: 0 | Status: COMPLETED | OUTPATIENT
Start: 2019-07-11 | End: 2019-07-11

## 2019-07-11 RX ORDER — MORPHINE SULFATE 50 MG/1
4 CAPSULE, EXTENDED RELEASE ORAL EVERY 4 HOURS
Refills: 0 | Status: DISCONTINUED | OUTPATIENT
Start: 2019-07-11 | End: 2019-07-11

## 2019-07-11 RX ORDER — NITROGLYCERIN 6.5 MG
1 CAPSULE, EXTENDED RELEASE ORAL ONCE
Refills: 0 | Status: COMPLETED | OUTPATIENT
Start: 2019-07-11 | End: 2019-07-11

## 2019-07-11 RX ORDER — DEXTROSE 50 % IN WATER 50 %
12.5 SYRINGE (ML) INTRAVENOUS ONCE
Refills: 0 | Status: DISCONTINUED | OUTPATIENT
Start: 2019-07-11 | End: 2019-07-12

## 2019-07-11 RX ORDER — DEXTROSE 50 % IN WATER 50 %
15 SYRINGE (ML) INTRAVENOUS ONCE
Refills: 0 | Status: DISCONTINUED | OUTPATIENT
Start: 2019-07-11 | End: 2019-07-12

## 2019-07-11 RX ORDER — FENTANYL CITRATE 50 UG/ML
25 INJECTION INTRAVENOUS ONCE
Refills: 0 | Status: DISCONTINUED | OUTPATIENT
Start: 2019-07-11 | End: 2019-07-11

## 2019-07-11 RX ADMIN — Medication 0.12 MILLIGRAM(S): at 08:37

## 2019-07-11 RX ADMIN — Medication 1 INCH(S): at 04:27

## 2019-07-11 RX ADMIN — MORPHINE SULFATE 2 MILLIGRAM(S): 50 CAPSULE, EXTENDED RELEASE ORAL at 11:30

## 2019-07-11 RX ADMIN — PANTOPRAZOLE SODIUM 40 MILLIGRAM(S): 20 TABLET, DELAYED RELEASE ORAL at 15:28

## 2019-07-11 RX ADMIN — CEFEPIME 2000 MILLIGRAM(S): 1 INJECTION, POWDER, FOR SOLUTION INTRAMUSCULAR; INTRAVENOUS at 06:03

## 2019-07-11 RX ADMIN — Medication 1 INCH(S): at 15:46

## 2019-07-11 RX ADMIN — Medication 10 MILLIGRAM(S): at 04:51

## 2019-07-11 RX ADMIN — CEFEPIME 100 MILLIGRAM(S): 1 INJECTION, POWDER, FOR SOLUTION INTRAMUSCULAR; INTRAVENOUS at 05:33

## 2019-07-11 RX ADMIN — SODIUM CHLORIDE 2000 MILLILITER(S): 9 INJECTION INTRAMUSCULAR; INTRAVENOUS; SUBCUTANEOUS at 06:22

## 2019-07-11 RX ADMIN — Medication 2.5 MILLIGRAM(S): at 18:53

## 2019-07-11 RX ADMIN — CEFEPIME 1000 MILLIGRAM(S): 1 INJECTION, POWDER, FOR SOLUTION INTRAMUSCULAR; INTRAVENOUS at 18:31

## 2019-07-11 RX ADMIN — AMIODARONE HYDROCHLORIDE 33.33 MG/MIN: 400 TABLET ORAL at 12:46

## 2019-07-11 RX ADMIN — Medication 0.5 INCH(S): at 23:15

## 2019-07-11 RX ADMIN — ONDANSETRON 4 MILLIGRAM(S): 8 TABLET, FILM COATED ORAL at 17:45

## 2019-07-11 RX ADMIN — Medication 40 MILLIGRAM(S): at 04:25

## 2019-07-11 RX ADMIN — Medication 2: at 15:10

## 2019-07-11 RX ADMIN — SODIUM CHLORIDE 2000 MILLILITER(S): 9 INJECTION INTRAMUSCULAR; INTRAVENOUS; SUBCUTANEOUS at 05:22

## 2019-07-11 RX ADMIN — ONDANSETRON 4 MILLIGRAM(S): 8 TABLET, FILM COATED ORAL at 12:40

## 2019-07-11 RX ADMIN — Medication 100 MILLIGRAM(S): at 15:28

## 2019-07-11 RX ADMIN — MORPHINE SULFATE 2 MILLIGRAM(S): 50 CAPSULE, EXTENDED RELEASE ORAL at 22:30

## 2019-07-11 RX ADMIN — MORPHINE SULFATE 2 MILLIGRAM(S): 50 CAPSULE, EXTENDED RELEASE ORAL at 11:46

## 2019-07-11 RX ADMIN — Medication 0.5 INCH(S): at 18:53

## 2019-07-11 RX ADMIN — Medication 166.67 MILLIGRAM(S): at 06:57

## 2019-07-11 RX ADMIN — MAGNESIUM OXIDE 400 MG ORAL TABLET 400 MILLIGRAM(S): 241.3 TABLET ORAL at 15:27

## 2019-07-11 RX ADMIN — Medication 162 MILLIGRAM(S): at 09:10

## 2019-07-11 RX ADMIN — Medication 2.5 MILLIGRAM(S): at 23:15

## 2019-07-11 RX ADMIN — Medication 1250 MILLIGRAM(S): at 08:27

## 2019-07-11 RX ADMIN — Medication 1: at 18:31

## 2019-07-11 RX ADMIN — FAMOTIDINE 20 MILLIGRAM(S): 10 INJECTION INTRAVENOUS at 10:04

## 2019-07-11 RX ADMIN — FENTANYL CITRATE 25 MICROGRAM(S): 50 INJECTION INTRAVENOUS at 06:44

## 2019-07-11 RX ADMIN — MORPHINE SULFATE 2 MILLIGRAM(S): 50 CAPSULE, EXTENDED RELEASE ORAL at 21:46

## 2019-07-11 RX ADMIN — MORPHINE SULFATE 2 MILLIGRAM(S): 50 CAPSULE, EXTENDED RELEASE ORAL at 05:06

## 2019-07-11 RX ADMIN — FENTANYL CITRATE 25 MICROGRAM(S): 50 INJECTION INTRAVENOUS at 07:10

## 2019-07-11 RX ADMIN — Medication 40 MILLIGRAM(S): at 14:18

## 2019-07-11 RX ADMIN — MORPHINE SULFATE 2 MILLIGRAM(S): 50 CAPSULE, EXTENDED RELEASE ORAL at 04:51

## 2019-07-11 RX ADMIN — RIVAROXABAN 20 MILLIGRAM(S): KIT at 20:37

## 2019-07-11 RX ADMIN — AMIODARONE HYDROCHLORIDE 16.67 MG/MIN: 400 TABLET ORAL at 18:31

## 2019-07-11 RX ADMIN — ONDANSETRON 4 MILLIGRAM(S): 8 TABLET, FILM COATED ORAL at 06:45

## 2019-07-11 RX ADMIN — AMIODARONE HYDROCHLORIDE 618 MILLIGRAM(S): 400 TABLET ORAL at 12:21

## 2019-07-11 NOTE — PROGRESS NOTE ADULT - ASSESSMENT
IMP:    77 y/o female with HTN, borderline DM, Chronic AF on rivaroxaban and HL presents with SOB found to be in Rapid AFIb and newly dx'ed with acute decompensated HFrEF (20%)  Lactic acidosis likely secondary to increased WOB and low flow state--doubt sepsis induced  Tachyarrhythmia    Suggest:    Will give another dose of furosemide 40 IV  Daily wt  Agree with Amio load--stay away from CCbx given low EF  May need ischemic w/u  Afterload reduce once BP demonstrate stability  Low threshold to stop Abx   Keep glucose < 180  DVT prophy--AC    Above d/w CCU staff and family/pt at bedside-- All concerns addressed

## 2019-07-11 NOTE — H&P ADULT - ASSESSMENT
78 year old woman with history of CHF, Afib on Xarelto, HTN BIBA with EMS for increasing SOB which started at 1130 pm last night. Patient was recently discharged from  for an admission for GI bleeding. Xarelto was held on dc. Patient had an outpt endoscopy 2 dyas ago and was told to restart back her Xarelto after the procedure. According to chart- patient went into respiratory arrest en route and she needed to have BVM- patient was connected to CPAP on admission has since been weaned off to N/C. Initial lactate was 9.8-->6.1. WBC 16.48, BNP 3185- initial troponin was 0.025-->0.207. Afib with RVR on admission- patient received Cardizem 10mg IV with good results- HR in the 90s . ICU consult for elevated lactate On exam at 1030 am- patient complaining of chest pain abdominal pain- pending CT abdomen and pelvis results. Patient still Afib with RVR- dig loaded. TTE preliminary read showed 20% EF- patient receiving #2 of 1Liter IVF bolus- patient could not be given another dose of dilitazem due to hypotension  SBP in the 80's. ICU again reconsulted. Patient was started on Cefepime and Vancomycin for possible HCAP.

## 2019-07-11 NOTE — ED PROVIDER NOTE - PROGRESS NOTE DETAILS
case dw ICU, agree with fluid at this time to repeat lactate at 6 am. pt responding well to cardizem and comfortable on bipap. case dw ICU, agree with fluid at this time to repeat lactate at 6 am. pt responding well to cardizem and comfortable on bipap.  lactate most likely due to hypoxia, pt is afebrile PATSY Story DO so am doctor repeat lactate, icu consult and disposition pending likely admit and icu pending repeat lactate PATSY Story DO juancho: pt endorsed to me from dr frausto, pt hd stable, lactate improving. now c/o new epigastric pain, given recent EGD will get Ct c/abd/pelvis. cardio rec ccu admission pt with EF 20% on echo, septic but cannot get 30cc/kg ivf bolus

## 2019-07-11 NOTE — ED PROVIDER NOTE - CLINICAL SUMMARY MEDICAL DECISION MAKING FREE TEXT BOX
pt with respiratory distress probably secondary to chf, will get labs, including lactate and blood cultures as pt was just recently hospitalized, ekg, place on bipap,nitroglycerin, morphine and lasix

## 2019-07-11 NOTE — ED ADULT NURSE NOTE - OBJECTIVE STATEMENT
Pt BIBEMS c/o respiratory distress and arrest. Per  at bedside, pt c/o worsening SOB at midnight. History of CHF, afib on Xarelto. En route, pt defecated and urinated on herself. Upon arrival, noted to be saturating 100% on BVM but unable to speak in full sentences. ED respiratory at bedside to put pt on bipap. Settings 10/5 50% O2. Maintained on tele monitor and spO2 monitoring. 12 lead ekg done. Pt cleaned and changed. Safety/fall precautions. Pt is obese. B/L LE +4 pitting edema, b/l UE +2 pitting edema.

## 2019-07-11 NOTE — H&P ADULT - PROBLEM SELECTOR PLAN 2
TTE  cardiology consult  lasix IV- will need to be redosed in AM  - monitor creatinine  - daily weights  - strict I/O

## 2019-07-11 NOTE — ED ADULT NURSE NOTE - NSIMPLEMENTINTERV_GEN_ALL_ED
Implemented All Fall with Harm Risk Interventions:  [place] to call system. Call bell, personal items and telephone within reach. Instruct patient to call for assistance. Room bathroom lighting operational. Non-slip footwear when patient is off stretcher. Physically safe environment: no spills, clutter or unnecessary equipment. Stretcher in lowest position, wheels locked, appropriate side rails in place. Provide visual cue, wrist band, yellow gown, etc. Monitor gait and stability. Monitor for mental status changes and reorient to person, place, and time. Review medications for side effects contributing to fall risk. Reinforce activity limits and safety measures with patient and family. Provide visual clues: red socks.

## 2019-07-11 NOTE — CONSULT NOTE ADULT - ASSESSMENT
78 year old woman with history of CHF, Afib on Xarelto, HTN, GI bleed admitted on 7/11 for evaluation of increased shortness of breath which began overnite; the patient noted with chest pain and abdominal pain; had recent endoscopy as well as recent admission for GI bleed. En route to hospital was in respiratory arrest and upon admission a rapid afib with hypotension. The patient noted with high lactate and concern for sepsis. History per medical record as patient unable to provide history.    1. Patient admitted with respiratory distress/failure; elevated lactate may be due to low oxygenation due to rapid afib and pulmonary edema? No infiltrates noted on imaging; may also be due to bowel/liver low flow state? Also noted with leukocytosis which may be due to infection versus stress  - follow up cultures   - oxygen and nebs as needed   - iv hydration and supportive care   - serial cbc and monitor temperature  - reviewed prior medical records to evaluate for resistant or atypical pathogens   - will continue cefepime but adjust dose given renal function  - will hold on further vancomycin   2. other issues; per medicine

## 2019-07-11 NOTE — H&P ADULT - NSHPREVIEWOFSYSTEMS_GEN_ALL_CORE
REVIEW OF SYSTEMS:    CONSTITUTIONAL: + weakness, no fevers  + chills  EYES/ENT: No visual changes;  No vertigo or throat pain  RESPIRATORY: No cough, wheezing, hemoptysis; No shortness of breath  CARDIOVASCULAR: chest pain  GASTROINTESTINAL: + abdominal pain. No nausea, vomiting, or hematemesis; No diarrhea or constipation. No melena or hematochezia.  GENITOURINARY: No dysuria, frequency or hematuria  NEUROLOGICAL: No numbness or weakness  SKIN: No itching, burning, rashes, or lesions   All other review of systems is negative unless indicated above.

## 2019-07-11 NOTE — ED PROVIDER NOTE - MUSCULOSKELETAL, MLM
Spine appears normal, range of motion is not limited, no muscle or joint tenderness. bilateral lower extremity edema right greater than left

## 2019-07-11 NOTE — ED PROVIDER NOTE - CRITICAL CARE PROVIDED
additional history taking/documentation/interpretation of diagnostic studies/direct patient care (not related to procedure)/consultation with other physicians/consult w/ pt's family directly relating to pts condition

## 2019-07-11 NOTE — CONSULT NOTE ADULT - ASSESSMENT
78 F with AF  on AC, recent admission for symoptomatic anemia s/p transfusion( no obvious GI source of bleedining onrecent scope) presentswith worseing shortness of breath and epigastric pain       differntial for symptoms includes ACS,  PNA , abdominal process    very elevated lactate upon presentation, a low flow state was treated with fluids and her hemodynamic stability has improved     recommend empiric abx ( elevated WBC)      recommended serial CE with CPK  check 2 d echocardiogram     recommend follwo up lacate  recommend CT- CAP to evalute for source of infection/ pain    given elevated lactate and tenuous stability- recommend CCU vs cardiac stepdown for close monitering

## 2019-07-11 NOTE — H&P ADULT - NSHPLABSRESULTS_GEN_ALL_CORE
< from: CT Abdomen and Pelvis w/ IV Cont (07.11.19 @ 09:45) >      EXAM:  CT ABDOMEN AND PELVIS IC                          EXAM:  CTA CHEST PE PROTOCOL (W)AW IC                            PROCEDURE DATE:  07/11/2019          INTERPRETATION:  CLINICAL INFORMATION: Shortness of breath, chest pain,   recent EGD.    COMPARISON: Chest CT 4/7/2018.    PROCEDURE:   CT Angiography of the Chest was performed followed by portal venous phase   imaging of the Abdomen and Pelvis.  IV Contrast: 90 ml of Omnipaque 350 was injected intravenously. 10 ml   were discarded.  Oral contrast: None.  Sagittal and coronal reformats were performed as well as 3D (MIP)   reconstructions.    FINDINGS:    CHEST:     LUNGS AND LARGE AIRWAYS: Patent central airways. There is compressive   atelectasis of the posterior lower lobes relatedto pleural effusions.   There is interlobular septal thickening with areas of groundglass   attenuation, likely representing pulmonary edema. Note that motion limits   assessment for pulmonary nodules.  PLEURA: There are bilateral pleural effusions, moderate on the right and   small on the left.  VESSELS: The thoracic aorta and main pulmonary artery are normal in   caliber. There is no pulmonary embolism. Note however that assessment of   the distal segmental to subsegmental vessels are limited due to motion.  HEART: Cardiomegaly. No pericardial effusion.  MEDIASTINUM AND KILO: No lymphadenopathy.  CHEST WALL AND LOWER NECK: The left thyroid lobe is within normal limits.   The right thyroid lobe is not definitively identified. There is no   supraclavicular lymphadenopathy. There is no axillary adenopathy.    ABDOMEN AND PELVIS:    LIVER: Within normal limits. There is a 2.8 x 1.8 cm cyst in the right   hepatic lobe. A subcentimeter hypodensity present in the lateral segment   of the left lobe, too small to further characterize.  BILE DUCTS: Normal caliber.  GALLBLADDER: There is edema of the gallbladder wall, which may be   secondary. If indicated, an ultrasound may be performed for further   assessment.  SPLEEN: Within normal limits.  PANCREAS: Within normal limits.  ADRENALS: Within normal limits.  KIDNEYS/URETERS: There is a 1.7 x 1.6 cm cyst at the upper pole of the   right kidney.    BLADDER: Within normal limits.  REPRODUCTIVE ORGANS: The uterus is present. The adnexa appear  unremarkable on CT.    BOWEL: No bowel obstruction. Appendix is not definitively identified.   There is colonic diverticulosis without evidence for diverticulitis.  PERITONEUM: No ascites.  VESSELS:  The abdominal aorta and IVC are normal in caliber. There is   mild atherosclerosis of the aorta.  RETROPERITONEUM: No lymphadenopathy.    ABDOMINAL WALL: There are multiple injection granulomas in the left   buttock.  BONES: There is multilevel degenerative change of the thoracolumbar   spine. Hardware is present in the lumbar spine related to laminectomy.    IMPRESSION:     No pulmonary embolism.    There is mild pulmonary edema and bilateral pleural effusions.    Wall thickening of the gallbladder which may be secondary. This may be   confirmed on ultrasound.    Colonic diverticulosis without diverticulitis.

## 2019-07-11 NOTE — ED ADULT TRIAGE NOTE - CHIEF COMPLAINT QUOTE
pt BIBA s/p respiratory distress that lead to respiratory arrest. pt on bvm 95% on arrival, a&ox3. hx afib, chf. pt BIBA s/p respiratory distress that lead to respiratory arrest. pt on bvm with o2 95% on arrival, a&ox3. hx afib, chf.

## 2019-07-11 NOTE — ED ADULT NURSE NOTE - CHIEF COMPLAINT QUOTE
pt BIBA s/p respiratory distress that lead to respiratory arrest. pt on bvm with o2 95% on arrival, a&ox3. hx afib, chf.

## 2019-07-11 NOTE — H&P ADULT - PROBLEM SELECTOR PLAN 4
hold anithypertensive due to hypotension  monitor BP.    Case d/w Dr Liya Ramirez explained to patient and her chuildren at the bedside.

## 2019-07-11 NOTE — CONSULT NOTE ADULT - ASSESSMENT
77yo female a/w Afib RVR, SOB, PNA      SOB  Afib with RVR  PNA  Lactic Acidosis  Anemia    - currently afebrile, HD stable /78, HR 90s Afib, on BIPAP, O2 sat 100%   - labs imaging, chart noted  - ABG with metabolic acidosis, resp alkalosis  - lactic acid 9.8, set to receive 2L Bolus      Recommend:  - Titrate off bipap, given ABG, and clinical improvement  - broad spectrum abx Vanco, Cefepime  - NPO  - Abd Xray  - Given abd pain, my need CT A/P with IV/PO contrast in setting of elevated lactate  - disposition pending, CCM will follow up

## 2019-07-11 NOTE — H&P ADULT - NSHPPHYSICALEXAM_GEN_ALL_CORE
Vital Signs Last 24 Hrs  T(C): 36.7 (11 Jul 2019 10:46), Max: 37.5 (11 Jul 2019 04:06)  T(F): 98.1 (11 Jul 2019 10:46), Max: 99.5 (11 Jul 2019 04:06)  HR: 116 (11 Jul 2019 10:38) (93 - 132)  BP: 93/58 (11 Jul 2019 10:38) (81/54 - 166/82)  BP(mean): --  RR: 28 (11 Jul 2019 10:38) (24 - 32)  SpO2: 97% (11 Jul 2019 10:38) (95% - 100%)    PE:  Constitutional: NAD, laying in bed  HEENT: NC/AT  Back: no tenderness  Respiratory: respirations even and non labored, decreased at the base, no rhonchi  Cardiovascular: S1S2 regular, no murmurs  Abdomen: Soft, mild epigastric tenderness ND, BS+  Genitourinary: voiding  Rectal: deferred  Musculoskeletal: no muscle tenderness, no joint swelling or tenderness  Extremities: no pedal edema   Neurological: no focal deficits

## 2019-07-11 NOTE — ED ADULT NURSE REASSESSMENT NOTE - NS ED NURSE REASSESS COMMENT FT1
Pt taken to CT, cardiac monitoring continued throughout.  Awaiting admitting orders from Dr. Leslie.  Pt repositioned upon return.
Pure-wick applied to pt for toileting.
Dr. Cerda at bedside speaking with family.

## 2019-07-11 NOTE — H&P ADULT - PROBLEM SELECTOR PLAN 3
initiallwith Af with RVR- received 10mg IV diltiazem- HR went down to 90's now RVR again and hypotensive  -received digoxin load  - ICU consulted for hypotension and recommends Amiodarone drip and hold off on CCB due to low EF  - spoke with Dr Cerda and he agreed with amiodarone drip- - will start.  - continue xarelto- monitor for bleeding

## 2019-07-11 NOTE — ED PROVIDER NOTE - CONSTITUTIONAL, MLM
normal... ill appearing, well nourished, awake, alert, oriented to person, place, time/situation and in moderate  distress with rr in the 30's.

## 2019-07-11 NOTE — H&P ADULT - ATTENDING COMMENTS
Hospitalist Update Note    Patient seen and examined, case discussed in detail with CAREN Ames and agree with H&P as stated above along with the following:     In brief, 78 y.o F PMH A fib on Xarelto admitted for acute on chronic diastolic CHF, A fib with RVR and possible HCAP concerning for aspiration after recent endoscopy. Seen with family at bedside and discussed extensively with son.   Patient w/ notable dyspnea maintaining sats on NC, c/o generalized pain. No fevers. Mild cough.     VS reviewed.   Gen; elderly weak appearing obese female, awake in mild distress  CV: +S1, S2, irregularly irregular and tachycardic.   Resp: protecting airway, decreased BS at bases, no wheezing.   Abd: distended, +BS, mildly TTP without guarding or rebounding  Ext: +2LE edema bilaterally    Labs/ images reviewed.     A&P:     # Acute Hypoxic Respiratory failure 2/2 Acute on Chronic Diastolic CHF  - now with component of systolic heart failure with newly decreased EF 20%.   - unclear precipitant, will eventually need ischemic work up.   - given concern for sepsis in the setting of elevated lactate now trending down patient given 2L IVFs.   - hold further fluids, given IV Lasix now.   - will continue abx for suspected PNA, f/u ID recs regarding discontinuing.   - daily weights, supplemental O2.     # Uncontrolled A. fib - s/p Digoxin IV  --> start IV Amio as patient not eligible for further CCB given systolic CHF, cannot give BB in the setting of hypotension.     Rest per above.   Case discussed with Dr. Cerda as well.

## 2019-07-11 NOTE — ED PROVIDER NOTE - NSCAREINITIATED _GEN_ER
Patient called, has vaginal discomfort, possible yeast infection.  Scheduled appt with santa 4/4 but didn't really want to wait another week. Please call back to advise if we can prescribe something over the phone.    
Returned patient phone call and patient feels she has a yeast infection. Patient is complaining of a white discharge and small amount of itching. Patient denies any odor. Prescription has been sent to the pharmacy of patient choice. Patient to keep appointment if no relief. Patient was in agreement with plan.   
Anabella Oconnell(Attending)

## 2019-07-11 NOTE — H&P ADULT - PROBLEM SELECTOR PLAN 1
acute hypoxic/ hypoxemic respiratory failure likely related to congestive heart failure vs HCAP from aspiration pneumonia  - CCu admit  - Cardiology consulted  - TTE preliminary showed 20% EF  - BNP>3000  - received IV lasix int he ED  - continue cefepime and Vanco for possibled HCAP- will treat for GNR and other resistant bacteria  - lactate 9 on admission- trending down- frantz related to hypoxia  - monitor sats  - on CPAP titrated down to NC  - CT chest and abdomen result noted  -   -

## 2019-07-12 ENCOUNTER — TRANSCRIPTION ENCOUNTER (OUTPATIENT)
Age: 79
End: 2019-07-12

## 2019-07-12 ENCOUNTER — INPATIENT (INPATIENT)
Facility: HOSPITAL | Age: 79
LOS: 6 days | Discharge: HOME | DRG: 286 | End: 2019-07-19
Attending: HOSPITALIST | Admitting: STUDENT IN AN ORGANIZED HEALTH CARE EDUCATION/TRAINING PROGRAM
Payer: MEDICARE

## 2019-07-12 VITALS
TEMPERATURE: 98 F | DIASTOLIC BLOOD PRESSURE: 71 MMHG | SYSTOLIC BLOOD PRESSURE: 138 MMHG | WEIGHT: 205.25 LBS | HEIGHT: 61 IN | HEART RATE: 98 BPM

## 2019-07-12 VITALS
OXYGEN SATURATION: 97 % | RESPIRATION RATE: 27 BRPM | HEART RATE: 72 BPM | DIASTOLIC BLOOD PRESSURE: 40 MMHG | SYSTOLIC BLOOD PRESSURE: 107 MMHG | TEMPERATURE: 98 F

## 2019-07-12 DIAGNOSIS — E89.0 POSTPROCEDURAL HYPOTHYROIDISM: Chronic | ICD-10-CM

## 2019-07-12 DIAGNOSIS — Z98.890 OTHER SPECIFIED POSTPROCEDURAL STATES: Chronic | ICD-10-CM

## 2019-07-12 DIAGNOSIS — I50.9 HEART FAILURE, UNSPECIFIED: ICD-10-CM

## 2019-07-12 LAB
ADD ON TEST-SPECIMEN IN LAB: SIGNIFICANT CHANGE UP
ADD ON TEST-SPECIMEN IN LAB: SIGNIFICANT CHANGE UP
ALBUMIN SERPL ELPH-MCNC: 2.5 G/DL — LOW (ref 3.3–5)
ALBUMIN SERPL ELPH-MCNC: 3.2 G/DL — LOW (ref 3.3–5)
ALP SERPL-CCNC: 61 U/L — SIGNIFICANT CHANGE UP (ref 40–120)
ALP SERPL-CCNC: 61 U/L — SIGNIFICANT CHANGE UP (ref 40–120)
ALT FLD-CCNC: 2378 U/L — HIGH (ref 10–45)
ALT FLD-CCNC: 2724 U/L — HIGH (ref 12–78)
ANION GAP SERPL CALC-SCNC: 13 MMOL/L — SIGNIFICANT CHANGE UP (ref 5–17)
ANION GAP SERPL CALC-SCNC: 17 MMOL/L — SIGNIFICANT CHANGE UP (ref 5–17)
APTT BLD: 34.4 SEC — SIGNIFICANT CHANGE UP (ref 27.5–36.3)
AST SERPL-CCNC: 2914 U/L — HIGH (ref 10–40)
AST SERPL-CCNC: 4473 U/L — HIGH (ref 15–37)
BASOPHILS # BLD AUTO: 0 K/UL — SIGNIFICANT CHANGE UP (ref 0–0.2)
BASOPHILS NFR BLD AUTO: 0.2 % — SIGNIFICANT CHANGE UP (ref 0–2)
BILIRUB DIRECT SERPL-MCNC: 0.3 MG/DL — HIGH (ref 0–0.2)
BILIRUB INDIRECT FLD-MCNC: 0.4 MG/DL — SIGNIFICANT CHANGE UP (ref 0.2–1)
BILIRUB SERPL-MCNC: 0.6 MG/DL — SIGNIFICANT CHANGE UP (ref 0.2–1.2)
BILIRUB SERPL-MCNC: 0.7 MG/DL — SIGNIFICANT CHANGE UP (ref 0.2–1.2)
BLD GP AB SCN SERPL QL: NEGATIVE — SIGNIFICANT CHANGE UP
BUN SERPL-MCNC: 26 MG/DL — HIGH (ref 7–23)
BUN SERPL-MCNC: 31 MG/DL — HIGH (ref 7–23)
CALCIUM SERPL-MCNC: 7.6 MG/DL — LOW (ref 8.4–10.5)
CALCIUM SERPL-MCNC: 7.6 MG/DL — LOW (ref 8.5–10.1)
CHLORIDE SERPL-SCNC: 100 MMOL/L — SIGNIFICANT CHANGE UP (ref 96–108)
CHLORIDE SERPL-SCNC: 107 MMOL/L — SIGNIFICANT CHANGE UP (ref 96–108)
CK MB CFR SERPL CALC: 3.6 NG/ML — SIGNIFICANT CHANGE UP (ref 0–3.8)
CK SERPL-CCNC: 97 U/L — SIGNIFICANT CHANGE UP (ref 25–170)
CO2 SERPL-SCNC: 16 MMOL/L — LOW (ref 22–31)
CO2 SERPL-SCNC: 20 MMOL/L — LOW (ref 22–31)
CREAT ?TM UR-MCNC: 158 MG/DL — SIGNIFICANT CHANGE UP
CREAT SERPL-MCNC: 1.27 MG/DL — SIGNIFICANT CHANGE UP (ref 0.5–1.3)
CREAT SERPL-MCNC: 1.41 MG/DL — HIGH (ref 0.5–1.3)
CRP SERPL-MCNC: 6.67 MG/DL — HIGH (ref 0–0.4)
DIGOXIN SERPL-MCNC: <0.4 NG/ML — LOW (ref 0.8–2)
EOSINOPHIL # BLD AUTO: 0.1 K/UL — SIGNIFICANT CHANGE UP (ref 0–0.5)
EOSINOPHIL NFR BLD AUTO: 0.5 % — SIGNIFICANT CHANGE UP (ref 0–6)
GLUCOSE BLDC GLUCOMTR-MCNC: 128 MG/DL — HIGH (ref 70–99)
GLUCOSE BLDC GLUCOMTR-MCNC: 151 MG/DL — HIGH (ref 70–99)
GLUCOSE SERPL-MCNC: 104 MG/DL — HIGH (ref 70–99)
GLUCOSE SERPL-MCNC: 138 MG/DL — HIGH (ref 70–99)
HBA1C BLD-MCNC: 5.4 % — SIGNIFICANT CHANGE UP (ref 4–5.6)
HCT VFR BLD CALC: 28.1 % — LOW (ref 34.5–45)
HCT VFR BLD CALC: 31.5 % — LOW (ref 34.5–45)
HCT VFR BLD CALC: 37.8 % — SIGNIFICANT CHANGE UP (ref 34.5–45)
HGB BLD-MCNC: 11.1 G/DL — LOW (ref 11.5–15.5)
HGB BLD-MCNC: 9 G/DL — LOW (ref 11.5–15.5)
HGB BLD-MCNC: 9.5 G/DL — LOW (ref 11.5–15.5)
INR BLD: 3.76 RATIO — HIGH (ref 0.88–1.16)
LACTATE SERPL-SCNC: 1.8 MMOL/L — SIGNIFICANT CHANGE UP (ref 0.7–2)
LACTATE SERPL-SCNC: 3.1 MMOL/L — HIGH (ref 0.7–2)
LACTATE SERPL-SCNC: 6.7 MMOL/L — CRITICAL HIGH (ref 0.7–2)
LYMPHOCYTES # BLD AUTO: 0.8 K/UL — LOW (ref 1–3.3)
LYMPHOCYTES # BLD AUTO: 7 % — LOW (ref 13–44)
MAGNESIUM SERPL-MCNC: 2.3 MG/DL — SIGNIFICANT CHANGE UP (ref 1.6–2.6)
MCHC RBC-ENTMCNC: 24.7 PG — LOW (ref 27–34)
MCHC RBC-ENTMCNC: 24.8 PG — LOW (ref 27–34)
MCHC RBC-ENTMCNC: 25.8 PG — LOW (ref 27–34)
MCHC RBC-ENTMCNC: 29.4 GM/DL — LOW (ref 32–36)
MCHC RBC-ENTMCNC: 30.2 GM/DL — LOW (ref 32–36)
MCHC RBC-ENTMCNC: 32.2 GM/DL — SIGNIFICANT CHANGE UP (ref 32–36)
MCV RBC AUTO: 80.1 FL — SIGNIFICANT CHANGE UP (ref 80–100)
MCV RBC AUTO: 82.2 FL — SIGNIFICANT CHANGE UP (ref 80–100)
MCV RBC AUTO: 84 FL — SIGNIFICANT CHANGE UP (ref 80–100)
MONOCYTES # BLD AUTO: 0.9 K/UL — SIGNIFICANT CHANGE UP (ref 0–0.9)
MONOCYTES NFR BLD AUTO: 7.9 % — SIGNIFICANT CHANGE UP (ref 2–14)
NEUTROPHILS # BLD AUTO: 9.6 K/UL — HIGH (ref 1.8–7.4)
NEUTROPHILS NFR BLD AUTO: 84.4 % — HIGH (ref 43–77)
NT-PROBNP SERPL-SCNC: HIGH PG/ML (ref 0–300)
PHOSPHATE SERPL-MCNC: 3.6 MG/DL — SIGNIFICANT CHANGE UP (ref 2.5–4.5)
PLATELET # BLD AUTO: 213 K/UL — SIGNIFICANT CHANGE UP (ref 150–400)
PLATELET # BLD AUTO: 299 K/UL — SIGNIFICANT CHANGE UP (ref 150–400)
PLATELET # BLD AUTO: 360 K/UL — SIGNIFICANT CHANGE UP (ref 150–400)
POTASSIUM SERPL-MCNC: 4.2 MMOL/L — SIGNIFICANT CHANGE UP (ref 3.5–5.3)
POTASSIUM SERPL-MCNC: 4.7 MMOL/L — SIGNIFICANT CHANGE UP (ref 3.5–5.3)
POTASSIUM SERPL-SCNC: 4.2 MMOL/L — SIGNIFICANT CHANGE UP (ref 3.5–5.3)
POTASSIUM SERPL-SCNC: 4.7 MMOL/L — SIGNIFICANT CHANGE UP (ref 3.5–5.3)
PROT ?TM UR-MCNC: 195 MG/DL — HIGH (ref 0–12)
PROT SERPL-MCNC: 5.4 G/DL — LOW (ref 6–8.3)
PROT SERPL-MCNC: 5.8 GM/DL — LOW (ref 6–8.3)
PROT/CREAT UR-RTO: 1.2 RATIO — HIGH (ref 0–0.2)
PROTHROM AB SERPL-ACNC: 45 SEC — HIGH (ref 10–12.9)
RBC # BLD: 3.5 M/UL — LOW (ref 3.8–5.2)
RBC # BLD: 3.83 M/UL — SIGNIFICANT CHANGE UP (ref 3.8–5.2)
RBC # BLD: 4.5 M/UL — SIGNIFICANT CHANGE UP (ref 3.8–5.2)
RBC # FLD: 17.3 % — HIGH (ref 10.3–14.5)
RBC # FLD: 17.5 % — HIGH (ref 10.3–14.5)
RBC # FLD: 17.5 % — HIGH (ref 10.3–14.5)
RH IG SCN BLD-IMP: POSITIVE — SIGNIFICANT CHANGE UP
SODIUM SERPL-SCNC: 136 MMOL/L — SIGNIFICANT CHANGE UP (ref 135–145)
SODIUM SERPL-SCNC: 137 MMOL/L — SIGNIFICANT CHANGE UP (ref 135–145)
TROPONIN I SERPL-MCNC: 0.43 NG/ML — HIGH (ref 0.01–0.04)
TROPONIN T, HIGH SENSITIVITY RESULT: 89 NG/L — HIGH (ref 0–51)
WBC # BLD: 11.4 K/UL — HIGH (ref 3.8–10.5)
WBC # BLD: 17.15 K/UL — HIGH (ref 3.8–10.5)
WBC # BLD: 20.15 K/UL — HIGH (ref 3.8–10.5)
WBC # FLD AUTO: 11.4 K/UL — HIGH (ref 3.8–10.5)
WBC # FLD AUTO: 17.15 K/UL — HIGH (ref 3.8–10.5)
WBC # FLD AUTO: 20.15 K/UL — HIGH (ref 3.8–10.5)

## 2019-07-12 PROCEDURE — 99232 SBSQ HOSP IP/OBS MODERATE 35: CPT

## 2019-07-12 PROCEDURE — 71045 X-RAY EXAM CHEST 1 VIEW: CPT | Mod: 26,77

## 2019-07-12 PROCEDURE — 93308 TTE F-UP OR LMTD: CPT | Mod: 26

## 2019-07-12 PROCEDURE — 93010 ELECTROCARDIOGRAM REPORT: CPT

## 2019-07-12 PROCEDURE — 76700 US EXAM ABDOM COMPLETE: CPT | Mod: 26

## 2019-07-12 PROCEDURE — 92960 CARDIOVERSION ELECTRIC EXT: CPT

## 2019-07-12 PROCEDURE — 99223 1ST HOSP IP/OBS HIGH 75: CPT | Mod: GC

## 2019-07-12 PROCEDURE — 71045 X-RAY EXAM CHEST 1 VIEW: CPT | Mod: 26

## 2019-07-12 PROCEDURE — 93010 ELECTROCARDIOGRAM REPORT: CPT | Mod: 77

## 2019-07-12 RX ORDER — FUROSEMIDE 40 MG
40 TABLET ORAL ONCE
Refills: 0 | Status: DISCONTINUED | OUTPATIENT
Start: 2019-07-12 | End: 2019-07-12

## 2019-07-12 RX ORDER — HEPARIN SODIUM 5000 [USP'U]/ML
3500 INJECTION INTRAVENOUS; SUBCUTANEOUS EVERY 6 HOURS
Refills: 0 | Status: DISCONTINUED | OUTPATIENT
Start: 2019-07-12 | End: 2019-07-15

## 2019-07-12 RX ORDER — ONDANSETRON 8 MG/1
4 TABLET, FILM COATED ORAL ONCE
Refills: 0 | Status: DISCONTINUED | OUTPATIENT
Start: 2019-07-12 | End: 2019-07-12

## 2019-07-12 RX ORDER — PHENYLEPHRINE HYDROCHLORIDE 10 MG/ML
0.5 INJECTION INTRAVENOUS
Qty: 160 | Refills: 0 | Status: DISCONTINUED | OUTPATIENT
Start: 2019-07-12 | End: 2019-07-12

## 2019-07-12 RX ORDER — HEPARIN SODIUM 5000 [USP'U]/ML
INJECTION INTRAVENOUS; SUBCUTANEOUS
Qty: 25000 | Refills: 0 | Status: DISCONTINUED | OUTPATIENT
Start: 2019-07-12 | End: 2019-07-15

## 2019-07-12 RX ORDER — HYDROMORPHONE HYDROCHLORIDE 2 MG/ML
1 INJECTION INTRAMUSCULAR; INTRAVENOUS; SUBCUTANEOUS ONCE
Refills: 0 | Status: DISCONTINUED | OUTPATIENT
Start: 2019-07-12 | End: 2019-07-12

## 2019-07-12 RX ORDER — FUROSEMIDE 40 MG
80 TABLET ORAL ONCE
Refills: 0 | Status: COMPLETED | OUTPATIENT
Start: 2019-07-12 | End: 2019-07-12

## 2019-07-12 RX ORDER — SODIUM CHLORIDE 9 MG/ML
1000 INJECTION INTRAMUSCULAR; INTRAVENOUS; SUBCUTANEOUS ONCE
Refills: 0 | Status: COMPLETED | OUTPATIENT
Start: 2019-07-12 | End: 2019-07-12

## 2019-07-12 RX ORDER — ONDANSETRON 8 MG/1
4 TABLET, FILM COATED ORAL ONCE
Refills: 0 | Status: COMPLETED | OUTPATIENT
Start: 2019-07-12 | End: 2019-07-12

## 2019-07-12 RX ORDER — AMIODARONE HYDROCHLORIDE 400 MG/1
0.5 TABLET ORAL
Qty: 900 | Refills: 0 | Status: DISCONTINUED | OUTPATIENT
Start: 2019-07-12 | End: 2019-07-12

## 2019-07-12 RX ORDER — CHLORHEXIDINE GLUCONATE 213 G/1000ML
1 SOLUTION TOPICAL
Refills: 0 | Status: DISCONTINUED | OUTPATIENT
Start: 2019-07-12 | End: 2019-07-13

## 2019-07-12 RX ORDER — INSULIN LISPRO 100/ML
VIAL (ML) SUBCUTANEOUS EVERY 6 HOURS
Refills: 0 | Status: DISCONTINUED | OUTPATIENT
Start: 2019-07-12 | End: 2019-07-12

## 2019-07-12 RX ORDER — HEPARIN SODIUM 5000 [USP'U]/ML
7500 INJECTION INTRAVENOUS; SUBCUTANEOUS ONCE
Refills: 0 | Status: DISCONTINUED | OUTPATIENT
Start: 2019-07-12 | End: 2019-07-12

## 2019-07-12 RX ORDER — SODIUM CHLORIDE 9 MG/ML
1000 INJECTION INTRAMUSCULAR; INTRAVENOUS; SUBCUTANEOUS
Refills: 0 | Status: DISCONTINUED | OUTPATIENT
Start: 2019-07-12 | End: 2019-07-12

## 2019-07-12 RX ORDER — NITROGLYCERIN 6.5 MG
1 CAPSULE, EXTENDED RELEASE ORAL
Qty: 0 | Refills: 0 | DISCHARGE
Start: 2019-07-12

## 2019-07-12 RX ORDER — HEPARIN SODIUM 5000 [USP'U]/ML
7500 INJECTION INTRAVENOUS; SUBCUTANEOUS EVERY 6 HOURS
Refills: 0 | Status: DISCONTINUED | OUTPATIENT
Start: 2019-07-12 | End: 2019-07-15

## 2019-07-12 RX ORDER — FERROUS SULFATE 325(65) MG
325 TABLET ORAL DAILY
Refills: 0 | Status: DISCONTINUED | OUTPATIENT
Start: 2019-07-12 | End: 2019-07-19

## 2019-07-12 RX ADMIN — ONDANSETRON 4 MILLIGRAM(S): 8 TABLET, FILM COATED ORAL at 21:09

## 2019-07-12 RX ADMIN — Medication 0.5 INCH(S): at 12:52

## 2019-07-12 RX ADMIN — PANTOPRAZOLE SODIUM 40 MILLIGRAM(S): 20 TABLET, DELAYED RELEASE ORAL at 10:28

## 2019-07-12 RX ADMIN — SODIUM CHLORIDE 2000 MILLILITER(S): 9 INJECTION INTRAMUSCULAR; INTRAVENOUS; SUBCUTANEOUS at 02:00

## 2019-07-12 RX ADMIN — Medication 0.5 INCH(S): at 06:00

## 2019-07-12 RX ADMIN — SODIUM CHLORIDE 75 MILLILITER(S): 9 INJECTION INTRAMUSCULAR; INTRAVENOUS; SUBCUTANEOUS at 05:05

## 2019-07-12 RX ADMIN — CEFEPIME 1000 MILLIGRAM(S): 1 INJECTION, POWDER, FOR SOLUTION INTRAMUSCULAR; INTRAVENOUS at 05:06

## 2019-07-12 RX ADMIN — Medication 30 MILLILITER(S): at 20:37

## 2019-07-12 RX ADMIN — Medication 80 MILLIGRAM(S): at 21:08

## 2019-07-12 RX ADMIN — MAGNESIUM OXIDE 400 MG ORAL TABLET 400 MILLIGRAM(S): 241.3 TABLET ORAL at 12:51

## 2019-07-12 RX ADMIN — HEPARIN SODIUM 1700 UNIT(S)/HR: 5000 INJECTION INTRAVENOUS; SUBCUTANEOUS at 21:21

## 2019-07-12 RX ADMIN — Medication 1: at 08:55

## 2019-07-12 RX ADMIN — Medication 0.5 INCH(S): at 06:05

## 2019-07-12 NOTE — H&P ADULT - NSHPREVIEWOFSYSTEMS_GEN_ALL_CORE
REVIEW OF SYSTEMS:    CONSTITUTIONAL: +weakness, no fevers or chills  EYES/ENT: No visual changes;  No vertigo or throat pain   NECK: No pain or stiffness  RESPIRATORY: +cough, no wheezing, hemoptysis; No shortness of breath  CARDIOVASCULAR: No chest pain or palpitations  GASTROINTESTINAL: +abdominal pain and nausea, no vomiting, or hematemesis; No diarrhea or constipation. No melena or hematochezia.  GENITOURINARY: No dysuria, frequency or hematuria  NEUROLOGICAL: No numbness or weakness  SKIN: No itching, rashes

## 2019-07-12 NOTE — CHART NOTE - NSCHARTNOTEFT_GEN_A_CORE
Notified by RN staff to evaluate patient with progressive abdominal pain. Notified by RN staff to evaluate patient with progressive abdominal pain. Upon examination the patient complained initially of diffuse abdominal pain. Upon examination the patient had (+)TTP overlying her LUQ. CT revealed GB wall thickening on admission. Repeat labs ordered revealed an acute elevation of LA, and WBC counts, and downtrending BP. [facility] was re-consulted due to the patient's acute deteriorating status and concern for impending septic shock.

## 2019-07-12 NOTE — PROGRESS NOTE ADULT - ASSESSMENT
78 year old woman with history of CHF, Afib on Xarelto, HTN, GI bleed admitted on 7/11 for evaluation of increased shortness of breath which began overnite; the patient noted with chest pain and abdominal pain; had recent endoscopy as well as recent admission for GI bleed. En route to hospital was in respiratory arrest and upon admission a rapid afib with hypotension. The patient noted with high lactate and concern for sepsis. History per medical record as patient unable to provide history.    1. Patient admitted with respiratory distress/failure; elevated lactate may be due to low oxygenation due to rapid afib and pulmonary edema? No infiltrates noted on imaging; may also be due to bowel/liver low flow state? Also noted with leukocytosis which may be due to infection versus stress  - follow up cultures   - oxygen and nebs as needed   - iv hydration and supportive care   - serial cbc and monitor temperature  - reviewed prior medical records to evaluate for resistant or atypical pathogens   - will stop cefepime, most likely patient admission is due to cardiopulmonary distress, causing lactic acidosis  - will hold on further vancomycin   2. other issues; per medicine  If further ID issues please reconsult

## 2019-07-12 NOTE — CONSULT NOTE ADULT - REASON FOR ADMISSION
shortness of breath
shortness of breath and chest tightness

## 2019-07-12 NOTE — DISCHARGE NOTE PROVIDER - HOSPITAL COURSE
79 y/o female with HTN, borderline DM, Chronic AF on rivaroxaban and HL presents with SOB found to be in Rapid AFIb and newly dx'ed with acute decompensated HFrEF (20%).  CXR with edema.  BNP 3K.  Lactate 10-->6 with IVF.  WBC 16K but no fevers.  Pt given furosemide 40 iv and IV vanco/cefe in ED.  Also given IV dilt and dig loaded.  Pt is admitted to CCU under medicine service        7/11:  CCU D # 1.  Pt seen and examined in CCU--family at bedside--hemodynamically stable--HR 120s AFib.  Increased WOB.          7/12;  CCU D # 2.  Above noted.  case d/w dr mcclendon.  Hypotensive overnight--did not require pressors--LFTs and Cr up.  Pt seen by dr latham--felt GB not source of infection--I also do not suspect sepsis as cause of her shock state as d/w dr amaya.  Family at bedside--above reviewed with them--acuity of situation and my concern for acute decline discussed 79 y/o female with HTN, borderline DM, Chronic AF on rivaroxaban and HL presents with SOB found to be in Rapid AFIb and newly dx'ed with acute decompensated HFrEF (20%).  CXR with edema.  BNP 3K.  Lactate 10-->6 with IVF.  WBC 16K but no fevers.  Pt given furosemide 40 iv and IV vanco/cefe in ED.  Also given IV dilt and dig loaded.  Pt is admitted to CCU under medicine service        7/11:  CCU D # 1.  Pt seen and examined in CCU--family at bedside--hemodynamically stable--HR 120s AFib.  Increased WOB.          7/12;  CCU D # 2.  Above noted.  case d/w dr mcclendon.  Hypotensive overnight--did not require pressors--LFTs and Cr up.  Pt seen by dr latham--felt GB not source of infection--I also do not suspect sepsis as cause of her shock state as d/w dr amaya.  Family at bedside--above reviewed with them--acuity of situation and my concern for acute decline discussed            Vital Signs Last 24 Hrs    T(C): 36.7 (12 Jul 2019 14:04), Max: 37.4 (12 Jul 2019 00:00)    T(F): 98.1 (12 Jul 2019 14:04), Max: 99.3 (12 Jul 2019 00:00)    HR: 91 (12 Jul 2019 15:30) (60 - 122)    BP: 104/58 (12 Jul 2019 15:30) (75/54 - 158/137)    BP(mean): 66 (12 Jul 2019 15:30) (36 - 142)    RR: 22 (12 Jul 2019 15:30) (18 - 31)    SpO2: 99% (12 Jul 2019 15:30) (87% - 100%)                Physical Exam:     · Constitutional    detailed exam    · Constitutional Details    Mod increased SOB.  ill    · Respiratory    detailed exam    · Respiratory Details    airway patent; breath sounds equal    · Cardiovascular    detailed exam    · Cardiovascular Details    irregular rate and rhythm    · Gastrointestinal    detailed exam    · GI Normal    soft; nontender    · Extremities    detailed exam    · Extremities Details    no cyanosis; pedal edema    · Pedal Edema Severity    1+    · Vascular    detailed exam    · Neurological    detailed exam    · Neurological Details    Grossly non focal    · Skin    detailed exam    · Scars    Cool    · Musculoskeletal    No joint pain, swelling or deformity; no limitation of movement

## 2019-07-12 NOTE — H&P ADULT - NSHPPHYSICALEXAM_GEN_ALL_CORE
PHYSICAL EXAM:  GENERAL: NAD, well-developed  EYES: EOMI, conjunctiva and sclera clear  NECK: Supple, +JVD  CHEST/LUNG: rales L >R   HEART: irregularly irregular rhythm; normal S1 and S2; no murmurs/rubs/gallops   ABDOMEN: Soft, Nontender, Nondistended; Bowel sounds present  EXTREMITIES:  +edema up to sacrum   PSYCH: AAOx3  NEUROLOGY: non-focal  SKIN: +ecchymosis

## 2019-07-12 NOTE — DISCHARGE NOTE PROVIDER - NSDCCPCAREPLAN_GEN_ALL_CORE_FT
PRINCIPAL DISCHARGE DIAGNOSIS  Diagnosis: Pneumonia  Assessment and Plan of Treatment: cardiogenic shock-transfer to [facility]      SECONDARY DISCHARGE DIAGNOSES  Diagnosis: CHF (congestive heart failure)  Assessment and Plan of Treatment:

## 2019-07-12 NOTE — PROGRESS NOTE ADULT - ASSESSMENT
IMP:    77 y/o female with HTN, borderline DM, Chronic AF on rivaroxaban and HL presents with SOB found to be in Rapid AFIb and newly dx'ed with acute decompensated HFrEF (20%)--Shock liver and NORM from hypoperfusion and ATN    Lactic acidosis likely secondary to increased WOB and low flow state--doubt sepsis induced  Tachyarrhythmia    Guarded prognosis     Suggest:    ?? candidate for invasive cardiac support--?? IABP or LVAD   Daily wt  Hold on Amio and BBx  Trend LFT and follow renal Fx  Afterload reduce once BP demonstrate stability  Low threshold to stop Abx   Keep glucose < 180  DVT prophy--AC    CCU care--d/w CCU staff and dr amaya and family at bedside-- All concerns addressed

## 2019-07-12 NOTE — PROGRESS NOTE ADULT - SUBJECTIVE AND OBJECTIVE BOX
Patient is a 78y old  Female who presents with a chief complaint of shortness of breath (11 Jul 2019 11:52)      Date of service: 07-12-19 @ 15:27    Patient lying in bed; more alert        ROS unable to obtain secondary to patient medical condition     MEDICATIONS  (STANDING):  ascorbic acid 500 milliGRAM(s) Oral daily  atorvastatin 20 milliGRAM(s) Oral at bedtime  dextrose 5%. 1000 milliLiter(s) (50 mL/Hr) IV Continuous <Continuous>  dextrose 50% Injectable 12.5 Gram(s) IV Push once  docusate sodium 100 milliGRAM(s) Oral three times a day  escitalopram 10 milliGRAM(s) Oral daily  fluticasone propionate 50 MICROgram(s)/spray Nasal Spray 2 Spray(s) Both Nostrils daily  insulin lispro (HumaLOG) corrective regimen sliding scale   SubCutaneous every 6 hours  magnesium oxide 400 milliGRAM(s) Oral daily  multivitamin 1 Tablet(s) Oral daily  nitroglycerin    2% Ointment 0.5 Inch(s) Transdermal every 6 hours  pantoprazole    Tablet 40 milliGRAM(s) Oral before breakfast  rivaroxaban 20 milliGRAM(s) Oral with dinner    MEDICATIONS  (PRN):  acetaminophen   Tablet .. 650 milliGRAM(s) Oral every 4 hours PRN Mild Pain (1 - 3)  dextrose 40% Gel 15 Gram(s) Oral once PRN Blood Glucose LESS THAN 70 milliGRAM(s)/deciliter  glucagon  Injectable 1 milliGRAM(s) IntraMuscular once PRN Glucose LESS THAN 70 milligrams/deciliter  ondansetron Injectable 4 milliGRAM(s) IV Push every 6 hours PRN Nausea  oxyCODONE    5 mG/acetaminophen 325 mG 1 Tablet(s) Oral every 4 hours PRN Moderate Pain (4 - 6)  senna 2 Tablet(s) Oral at bedtime PRN Constipation      Vital Signs Last 24 Hrs  T(C): 36.7 (12 Jul 2019 14:04), Max: 37.4 (12 Jul 2019 00:00)  T(F): 98.1 (12 Jul 2019 14:04), Max: 99.3 (12 Jul 2019 00:00)  HR: 96 (12 Jul 2019 15:00) (60 - 122)  BP: 116/53 (12 Jul 2019 15:00) (75/54 - 158/137)  BP(mean): 68 (12 Jul 2019 15:00) (36 - 142)  RR: 23 (12 Jul 2019 15:00) (18 - 31)  SpO2: 99% (12 Jul 2019 15:00) (87% - 100%)    Physical Exam:        PE:    Constitutional: frail looking  HEENT: NC/AT, EOMI, PERRLA, conjunctivae clear; ears and nose atraumatic; pharynx clear  Neck: supple; thyroid not palpable  Back: no tenderness  Respiratory: respiratory effort normal; scattered wheeze  Cardiovascular: S1S2 regular, no murmurs  Abdomen: soft, not tender, not distended, positive BS; no liver or spleen organomegaly  Genitourinary: no suprapubic tenderness  Musculoskeletal: no muscle tenderness, lower extremity edema  Neurological/ Psychiatric:  moving all extremities  Skin: no rashes; no palpable lesions    Labs: all available labs reviewed                        Labs:                        9.5    17.15 )-----------( 299      ( 12 Jul 2019 06:48 )             31.5     07-12    136  |  107  |  26<H>  ----------------------------<  138<H>  4.7   |  16<L>  |  1.41<H>    Ca    7.6<L>      12 Jul 2019 06:48  Mg     2.6     07-11    TPro  5.8<L>  /  Alb  2.5<L>  /  TBili  0.7  /  DBili  0.3<H>  /  AST  4473<H>  /  ALT  2724<H>  /  AlkPhos  61  07-12           Cultures:       Culture - Blood (collected 07-11-19 @ 04:29)  Source: .Blood Blood-Peripheral  Preliminary Report (07-12-19 @ 09:01):    No growth to date.    Culture - Blood (collected 07-11-19 @ 04:29)  Source: .Blood Blood-Peripheral  Preliminary Report (07-12-19 @ 09:01):    No growth to date.          < from: CT Abdomen and Pelvis w/ IV Cont (07.11.19 @ 09:45) >    EXAM:  CT ABDOMEN AND PELVIS IC                          EXAM:  CTA CHEST PE PROTOCOL (W)AW IC                            PROCEDURE DATE:  07/11/2019          INTERPRETATION:  CLINICAL INFORMATION: Shortness of breath, chest pain,   recent EGD.    COMPARISON: Chest CT 4/7/2018.    PROCEDURE:   CT Angiography of the Chest was performed followed by portal venous phase   imaging of the Abdomen and Pelvis.  IV Contrast: 90 ml of Omnipaque 350 was injected intravenously. 10 ml   were discarded.  Oral contrast: None.  Sagittal and coronal reformats were performed as well as 3D (MIP)   reconstructions.    FINDINGS:    CHEST:     LUNGS AND LARGE AIRWAYS: Patent central airways. There is compressive   atelectasis of the posterior lower lobes relatedto pleural effusions.   There is interlobular septal thickening with areas of groundglass   attenuation, likely representing pulmonary edema. Note that motion limits   assessment for pulmonary nodules.  PLEURA: There are bilateral pleural effusions, moderate on the right and   small on the left.  VESSELS: The thoracic aorta and main pulmonary artery are normal in   caliber. There is no pulmonary embolism. Note however that assessment of   the distal segmental to subsegmental vessels are limited due to motion.  HEART: Cardiomegaly. No pericardial effusion.  MEDIASTINUM AND KILO: No lymphadenopathy.  CHEST WALL AND LOWER NECK: The left thyroid lobe is within normal limits.   The right thyroid lobe is not definitively identified. There is no   supraclavicular lymphadenopathy. There is no axillary adenopathy.    ABDOMEN AND PELVIS:    LIVER: Within normal limits. There is a 2.8 x 1.8 cm cyst in the right   hepatic lobe. A subcentimeter hypodensity present in the lateral segment   of the left lobe, too small to further characterize.  BILE DUCTS: Normal caliber.  GALLBLADDER: There is edema of the gallbladder wall, which may be   secondary. If indicated, an ultrasound may be performed for further   assessment.  SPLEEN: Within normal limits.  PANCREAS: Within normal limits.  ADRENALS: Within normal limits.  KIDNEYS/URETERS: There is a 1.7 x 1.6 cm cyst at the upper pole of the   right kidney.    BLADDER: Within normal limits.  REPRODUCTIVE ORGANS: The uterus is present. The adnexa appear  unremarkable on CT.    BOWEL: No bowel obstruction. Appendix is not definitively identified.   There is colonic diverticulosis without evidence for diverticulitis.  PERITONEUM: No ascites.  VESSELS:  The abdominal aorta and IVC are normal in caliber. There is   mild atherosclerosis of the aorta.  RETROPERITONEUM: No lymphadenopathy.    ABDOMINAL WALL: There are multiple injection granulomas in the left   buttock.  BONES: There is multilevel degenerative change of the thoracolumbar   spine. Hardware is present in the lumbar spine related to laminectomy.    IMPRESSION:     No pulmonary embolism.    There is mild pulmonary edema and bilateral pleural effusions.    Wall thickening of the gallbladder which may be secondary. This may be   confirmed on ultrasound.    Colonic diverticulosis without diverticulitis.    Other incidental findings are as detailed above.      < end of copied text >          Radiology: all available radiological tests reviewed    Advanced directives addressed: full resuscitation

## 2019-07-12 NOTE — PROGRESS NOTE ADULT - SUBJECTIVE AND OBJECTIVE BOX
CC:  Resp distress    HPI:    77 y/o female with HTN, borderline DM, Chronic AF on rivaroxaban and HL presents with SOB found to be in Rapid AFIb and newly dx'ed with acute decompensated HFrEF (20%).  CXR with edema.  BNP 3K.  Lactate 10-->6 with IVF.  WBC 16K but no fevers.  Pt given furosemide 40 iv and IV vanco/cefe in ED.  Also given IV dilt and dig loaded.  Pt is admitted to CCU under medicine service    :  CCU D # 1.  Pt seen and examined in CCU--family at bedside--hemodynamically stable--HR 120s AFib.  Increased WOB.      ;  CCU D # 2.  Above noted.  case d/w dr mcclendon.  Hypotensive overnight--did not require pressors--LFTs and Cr up.  Pt seen by dr latham--felt GB not source of infection--I also do not suspect sepsis as cause of her shock state as d/w dr amaya.  Family at bedside--above reviewed with them--acuity of situation and my concern for acute decline discussed    PMH:  As above.     PSH:  As above.     FH: Non Contributory other than those listed in HPI    Social History:      MEDICATIONS  (STANDING):  ascorbic acid 500 milliGRAM(s) Oral daily  atorvastatin 20 milliGRAM(s) Oral at bedtime  cefepime  Injectable. 1000 milliGRAM(s) IV Push every 12 hours  dextrose 5%. 1000 milliLiter(s) (50 mL/Hr) IV Continuous <Continuous>  dextrose 50% Injectable 12.5 Gram(s) IV Push once  docusate sodium 100 milliGRAM(s) Oral three times a day  escitalopram 10 milliGRAM(s) Oral daily  fluticasone propionate 50 MICROgram(s)/spray Nasal Spray 2 Spray(s) Both Nostrils daily  insulin lispro (HumaLOG) corrective regimen sliding scale   SubCutaneous every 6 hours  magnesium oxide 400 milliGRAM(s) Oral daily  multivitamin 1 Tablet(s) Oral daily  nitroglycerin    2% Ointment 0.5 Inch(s) Transdermal every 6 hours  pantoprazole    Tablet 40 milliGRAM(s) Oral before breakfast  phenylephrine    Infusion 0.5 MICROgram(s)/kG/Min (8.297 mL/Hr) IV Continuous <Continuous>  rivaroxaban 20 milliGRAM(s) Oral with dinner    MEDICATIONS  (PRN):  acetaminophen   Tablet .. 650 milliGRAM(s) Oral every 4 hours PRN Mild Pain (1 - 3)  dextrose 40% Gel 15 Gram(s) Oral once PRN Blood Glucose LESS THAN 70 milliGRAM(s)/deciliter  glucagon  Injectable 1 milliGRAM(s) IntraMuscular once PRN Glucose LESS THAN 70 milligrams/deciliter  ondansetron Injectable 4 milliGRAM(s) IV Push every 6 hours PRN Nausea  oxyCODONE    5 mG/acetaminophen 325 mG 1 Tablet(s) Oral every 4 hours PRN Moderate Pain (4 - 6)  senna 2 Tablet(s) Oral at bedtime PRN Constipation      Allergies: NKDA    ROS:  SEE BELOW        ICU Vital Signs Last 24 Hrs  T(C): 37.3 (2019 04:00), Max: 37.4 (2019 00:00)  T(F): 99.1 (2019 04:00), Max: 99.3 (2019 00:00)  HR: 67 (2019 06:30) (60 - 133)  BP: 103/50 (2019 06:30) (75/54 - 158/137)  BP(mean): 61 (2019 06:30) (33 - 142)  ABP: --  ABP(mean): --  RR: 25 (2019 06:30) (18 - 31)  SpO2: 100% (2019 06:30) (87% - 100%)          I&O's Summary    2019 07:01  -  2019 07:00  --------------------------------------------------------  IN: 891 mL / OUT: 1430 mL / NET: -539 mL        Physical Exam:  SEE BELOW                          9.5    17.15 )-----------( 299      ( 2019 06:48 )             31.5       07-12    136  |  107  |  26<H>  ----------------------------<  138<H>  4.7   |  16<L>  |  1.41<H>    Ca    7.6<L>      2019 06:48  Mg     2.6     07-11    TPro  5.8<L>  /  Alb  2.5<L>  /  TBili  0.7  /  DBili  0.3<H>  /  AST  4473<H>  /  ALT  2724<H>  /  AlkPhos  61  07-12      CARDIAC MARKERS ( 2019 22:27 )  0.426 ng/mL / x     / x     / x     / x      CARDIAC MARKERS ( 2019 10:42 )  0.265 ng/mL / x     / 50 U/L / x     / x      CARDIAC MARKERS ( 2019 07:31 )  0.207 ng/mL / x     / 42 U/L / x     / x      CARDIAC MARKERS ( 2019 04:17 )  0.025 ng/mL / x     / x     / x     / x            ABG - ( 2019 06:00 )  pH, Arterial: 7.37  pH, Blood: x     /  pCO2: 34    /  pO2: 181   / HCO3: 19    / Base Excess: -5.3  /  SaO2: 98                  Urinalysis Basic - ( 2019 06:45 )    Color: Yellow / Appearance: Clear / S.010 / pH: x  Gluc: x / Ketone: Negative  / Bili: Negative / Urobili: Negative mg/dL   Blood: x / Protein: 30 mg/dL / Nitrite: Negative   Leuk Esterase: Negative / RBC: 0-2 /HPF / WBC 3-5   Sq Epi: x / Non Sq Epi: Few / Bacteria: Few        DVT Prophylaxis:                                                            Contraindication:     Advanced Directives:    Discussed with:    Visit Information:  Time spent excluding procedure:  Add'l 60 cc mins    ** Time is exclusive of billed procedures and/or teaching and/or routine family updates.

## 2019-07-12 NOTE — PROGRESS NOTE ADULT - SUBJECTIVE AND OBJECTIVE BOX
CHIEF COMPLAINT: Patient is a 78y old  Female who presents with a chief complaint of     HPI: 79 yo female biba from home for increasing dyspnea with sob that started at 1130pm last night. Pt has hx of diastolic chf. According to ems pt had resp arrest en route and needed to have bvm. on arrival pt is sob and dsypneic but awake, alert and oriented. c. pt has afib and is on xarelto.  she also described epigastric tightness        pain is less in epigastric region, improved hemodynamics. 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 No nausea, vomiting, or hematemesis; No diarrhea or constipation. No melena or hematochezia.  GENITOURINARY: No dysuria, frequency or hematuria  NEUROLOGICAL: No numbness or weakness  SKIN: No itching, burning, rashes, or lesions   All other review of systems is negative unless indicated above    ICU Vital Signs Last 24 Hrs  T(C): 37.3 (12 Jul 2019 04:00), Max: 37.4 (12 Jul 2019 00:00)  T(F): 99.1 (12 Jul 2019 04:00), Max: 99.3 (12 Jul 2019 00:00)  HR: 67 (12 Jul 2019 06:30) (60 - 133)  BP: 103/50 (12 Jul 2019 06:30) (75/54 - 166/82)  BP(mean): 61 (12 Jul 2019 06:30) (33 - 142)  ABP: --  ABP(mean): --  RR: 25 (12 Jul 2019 06:30) (18 - 31)  SpO2: 100% (12 Jul 2019 06:30) (87% - 100%)            PHYSICAL EXAM:   Constitutional: NAD, awake and alert, well-developed  HEENT: PERR, EOMI, Normal Hearing, MMM  Neck: Soft and supple, No LAD, No JVD  Respiratory: Breath sounds are clear bilaterally, No wheezing, rales or rhonchi  Cardiovascular: S1 and S2, regular rate and rhythm, no Murmurs, gallops or rubs  Gastrointestinal: Bowel Sounds present, soft, nontender, nondistended, no guarding, no rebound  Extremities: No peripheral edema  Vascular: 2+ peripheral pulses  Neurological: A/O x 3, no focal deficits  Musculoskeletal: 5/5 strength b/l upper and lower extremities  Skin: No rashes    MEDICATIONS:  MEDICATIONS  (STANDING):      LABS: All Labs Reviewed:                            9.5    17.15 )-----------( 299      ( 12 Jul 2019 06:48 )             31.5   07-12    136  |  107  |  26<H>  ----------------------------<  138<H>  4.7   |  16<L>  |  1.41<H>    Ca    7.6<L>      12 Jul 2019 06:48  Mg     2.6     07-11    TPro  6.7  /  Alb  2.9<L>  /  TBili  0.8  /  DBili  x   /  AST  1189<H>  /  ALT  978<H>  /  AlkPhos  71  07-11          Telemetry: AF with RVR , abberent conduction    ECHO:< from: Transthoracic Echocardiogram (05.17.17 @ 10:44) >   Findings     Mitral Valve   Fibrocalcific changes noted to the mitral valve leaflets with preserved   leaflet excursion.   Moderate to severe mitral regurgitation is present.     Aortic Valve   The aortic valve is not well visualized. Valve opening seems to be   normal.     Tricuspid Valve   Moderate (2+) tricuspid valve regurgitation is present.   Mild pulmonary hypertension.     Pulmonic Valve   Pulmonic valve not well seen, probably normal pulmonic valve function.     Left Atrium   The left atrium is mildly dilated.     Left Ventricle   The left ventricle appears normal insize, wall thickness, wall motion   and   contractility as seen in limited views.   Estimated left ventricular ejection fraction is 55-60 %.     Right Atrium   The right atrium appears mildly dilated.     Right Ventricle   Normal appearing right ventricle structure and function.     Pericardial Effusion   A small pericardial effusion is present.     Pleural Effusion   Pleural effusion - is present..     Miscellaneous   The IVC is mildly dilated but collapsing.     Impression     Summary     Fibrocalcific changes noted to the mitral valve leaflets with preserved   leaflet excursion.   Moderate to severe mitral regurgitation is present.     Signature     ----------------------------------------------------------------   Electronically signed Stefanie Lunsford MD(Interpreting   physician) on 05/17/2017 01:21 PM   ----------------------------------------------------------------    < end of copied text >

## 2019-07-12 NOTE — CONSULT NOTE ADULT - ASSESSMENT
Shortness of breath, acidosis, elevated troponin, possible cardiac event. Abdominal exam shows no peritoneal signs. CT scan not impressive. S/p EGD and colonoscopy by Dr Theodore group this Monday. Suggest GI evaluation. HIDA scan. NPO for now. Will follow. OK to continue blood thinner right now as no surgery is anticipated.

## 2019-07-12 NOTE — CONSULT NOTE ADULT - SUBJECTIVE AND OBJECTIVE BOX
Patient is a 78y Female whom presented to the hospital with CHF, Afib on Xarelto, HTN BIBA with EMS for increasing SOB which started night prior to admit, renal evaluation of NORM. Patient was recently discharged from  for an admission for GI bleeding, en route to hospital needed BVM and weaned to NC but also with elevated lactate, troponin and admitted to CCU. Patient get get ct a/p/c with contrast on the .  Making urine, + escamilla, 300 since change of shift this AM. Hypotensive during admit, has gotten 3 liters during admission. Lasix ordered, but held due to hypotension. AF with RVR needing dig, amio as well and episodes of hypotension.     PAST MEDICAL & SURGICAL HISTORY:  Gastrointestinal hemorrhage associated with intestinal diverticulosis  Afib  CHF (congestive heart failure)  Gastroesophageal reflux disease, esophagitis presence not specified  Essential hypertension  S/P right knee arthroscopy: 2006  H/O partial thyroidectomy: hx of Papillary Thyroid Ca s/p partial thyroidectomy 2014      MEDICATIONS  (STANDING):  ascorbic acid 500 milliGRAM(s) Oral daily  atorvastatin 20 milliGRAM(s) Oral at bedtime  cefepime  Injectable. 1000 milliGRAM(s) IV Push every 12 hours  dextrose 5%. 1000 milliLiter(s) (50 mL/Hr) IV Continuous <Continuous>  dextrose 50% Injectable 12.5 Gram(s) IV Push once  docusate sodium 100 milliGRAM(s) Oral three times a day  escitalopram 10 milliGRAM(s) Oral daily  fluticasone propionate 50 MICROgram(s)/spray Nasal Spray 2 Spray(s) Both Nostrils daily  insulin lispro (HumaLOG) corrective regimen sliding scale   SubCutaneous every 6 hours  magnesium oxide 400 milliGRAM(s) Oral daily  multivitamin 1 Tablet(s) Oral daily  nitroglycerin    2% Ointment 0.5 Inch(s) Transdermal every 6 hours  pantoprazole    Tablet 40 milliGRAM(s) Oral before breakfast  rivaroxaban 20 milliGRAM(s) Oral with dinner    MEDICATIONS  (PRN):  acetaminophen   Tablet .. 650 milliGRAM(s) Oral every 4 hours PRN Mild Pain (1 - 3)  dextrose 40% Gel 15 Gram(s) Oral once PRN Blood Glucose LESS THAN 70 milliGRAM(s)/deciliter  glucagon  Injectable 1 milliGRAM(s) IntraMuscular once PRN Glucose LESS THAN 70 milligrams/deciliter  ondansetron Injectable 4 milliGRAM(s) IV Push every 6 hours PRN Nausea  oxyCODONE    5 mG/acetaminophen 325 mG 1 Tablet(s) Oral every 4 hours PRN Moderate Pain (4 - 6)  senna 2 Tablet(s) Oral at bedtime PRN Constipation      Allergies    No Known Allergies    Intolerances        SOCIAL HISTORY:  no etoh/cigg    FAMILY HISTORY:  No pertinent family history in first degree relatives      REVIEW OF SYSTEMS:    CONSTITUTIONAL: stable weakness, fevers or chills  EYES/ENT: No visual changes;  No vertigo or throat pain   NECK: No pain or stiffness  RESPIRATORY: No cough, wheezing, hemoptysis; improved  shortness of breath but not at baseline  CARDIOVASCULAR: No chest pain or palpitations  GASTROINTESTINAL: No abdominal or epigastric pain. No nausea, vomiting, or hematemesis; No diarrhea or constipation. No melena or hematochezia.  GENITOURINARY: No dysuria, frequency or hematuria  NEUROLOGICAL: No numbness or weakness  SKIN: No itching, burning, rashes, or lesions   All other review of systems is negative unless indicated above.      T(C): , Max: 37.4 (19 @ 00:00)  T(F): , Max: 99.3 (19 @ 00:00)  HR: 96 (19 @ 15:00)  BP: 116/53 (19 @ 15:00)  BP(mean): 68 (19 @ 15:00)  RR: 23 (19 @ 15:00)  SpO2: 99% (19 15:00)  Wt(kg): --     @ 07:  -   @ 07:00  --------------------------------------------------------  IN: 891 mL / OUT: 1430 mL / NET: -539 mL     @ 07:01  -   @ 15:06  --------------------------------------------------------  IN: 71 mL / OUT: 200 mL / NET: -129 mL          PHYSICAL EXAM:    Constitutional: NAD  HEENT: PERRLA, EOMI,  MMM  Neck: No LAD, No JVD  Respiratory: trace creps  Cardiovascular: S1 and S2, RRR  Gastrointestinal: BS+, soft, NT/ND  Extremities: LE edema/chronic changes  Neurological: A/O x 3, no focal deficits  Psychiatric: Normal mood, normal affect  : No Escamilla  Skin: No rashes  Access: Not applicable        LABS:                        9.5    17.15 )-----------( 299      ( 2019 06:48 )             31.5     2019 06:48    136    |  107    |  26     ----------------------------<  138    4.7     |  16     |  1.41   2019 22:27    136    |  104    |  22     ----------------------------<  135    4.6     |  19     |  1.08   2019 10:42    132    |  101    |  18     ----------------------------<  175    4.8     |  19     |  0.85   2019 04:17    141    |  107    |  18     ----------------------------<  360    4.1     |  19     |  1.04     Ca    7.6        2019 06:48  Ca    8.2        2019 22:27  Ca    7.8        2019 10:42  Ca    8.3        2019 04:17  Mg     2.6       2019 04:17    TPro  5.8    /  Alb  2.5    /  TBili  0.7    /  DBili  0.3    /  AST  4473   /  ALT  2724   /  AlkPhos  61     2019 06:48  TPro  6.7    /  Alb  2.9    /  TBili  0.8    /  DBili  x      /  AST  1189   /  ALT  978    /  AlkPhos  71     2019 22:27  TPro  5.6    /  Alb  2.4    /  TBili  0.3    /  DBili  x      /  AST  69     /  ALT  66     /  AlkPhos  57     2019 10:42  TPro  6.6    /  Alb  2.9    /  TBili  0.3    /  DBili  x      /  AST  99     /  ALT  61     /  AlkPhos  70     2019 04:17      Hemoglobin A1C, Whole Blood: 5.4 % [4.0 - 5.6] ( @ 06:48)      Urine Studies:  Urinalysis Basic - ( 2019 06:45 )    Color: Yellow / Appearance: Clear / S.010 / pH: x  Gluc: x / Ketone: Negative  / Bili: Negative / Urobili: Negative mg/dL   Blood: x / Protein: 30 mg/dL / Nitrite: Negative   Leuk Esterase: Negative / RBC: 0-2 /HPF / WBC 3-5   Sq Epi: x / Non Sq Epi: Few / Bacteria: Few            RADIOLOGY & ADDITIONAL STUDIES:
78 year old woman with history of CHF, Afib on Xarelto, HTN BIBA with EMS for increasing SOB which started at 1130 pm last night. Patient was recently discharged from  for an admission for GI bleeding. Xarelto was held on dc. Patient had an outpt endoscopy 2 dyas ago and was told to restart back her Xarelto after the procedure. According to chart- patient went into respiratory arrest en route and she needed to have BVM- patient was connected to CPAP on admission has since been weaned off to N/C. Initial lactate was 9.8-->6.1. WBC 16.48, BNP 3185- initial troponin was 0.025-->0.207. Afib with RVR on admission- patient received Cardizem 10mg IV with good results- HR in the 90s . ICU consult for elevated lactate Patient still Afib with RVR- dig loaded. TTE preliminary read showed 20% EF- patient receiving #2 of 1Liter IVF bolus- patient could not be given another dose of dilitazem due to hypotension  SBP in the 80's. ICU again reconsulted. Patient was started on Cefepime and Vancomycin for possible HCAP. CT scan shows some gallbladder wall thickening. I have been asked to evaluate her for abdominal pain and lactic acidosis.    PMH- Afib, HTN. CHF, GI Bleeding  PSH H/O partial thyroidectomy hx of Papillary Thyroid Ca s/p partial thyroidectomy 7/2014  S/P right knee arthroscopy 4/2006.   No pertinent family history in first degree relatives.   No Pertinent Family History in first degree relatives of: CAD, CVA, Cancer.  Social- denies smoking or drinkingHome Medications:   * Patient Currently Takes Medications as of 11-Jul-2019 10:42 documented in Structured Notes  · 	NexIUM 40 mg oral delayed release capsule: Last Dose Taken:  , 1 cap(s) orally once a day  · 	Vytorin 10 mg-20 mg oral tablet: Last Dose Taken:  , 1 tab(s) orally once a day  	***Patients Own Med***  · 	Lexapro 10 mg oral tablet: Last Dose Taken:  , 1 tab(s) orally once a day  	***Patient must use brand***  · 	DilTIAZem Hydrochloride  mg/24 hours oral capsule, extended release: Last Dose Taken:  , 1 cap(s) orally once a day  · 	Multiple Vitamins oral tablet: Last Dose Taken:  , 1 tab(s) orally once a day  · 	Mag-Ox 400 oral tablet: Last Dose Taken:  , 1 tab(s) orally once a day  · 	Flonase 50 mcg/inh nasal spray: Last Dose Taken:  , 1 spray(s) nasal once a day (in the evening)  · 	ferrous gluconate 324 mg (37.5 mg elemental iron) oral tablet: Last Dose Taken:  , 1 tab(s) orally once a day after a meal  · 	Systane Ultra preserved ophthalmic solution: Last Dose Taken:  , 1 drop(s) to each affected eye once a day  · 	Diovan  mg-25 mg oral tablet: Last Dose Taken:  , 0.5 tab(s) orally once a day if systolic blood pressure more than 140 at home  · 	Vitamin C 500 mg oral tablet: Last Dose Taken:  , 1 tab(s) orally once a day with iron  · 	Livalo 4 mg oral tablet: Last Dose Taken:  , 1 tab(s) orally once a day  · 	raNITIdine 300 mg oral tablet: Last Dose Taken:  , 1 tab(s) orally once a day (at bedtime)  · 	furosemide 40 mg oral tablet: Last Dose Taken:  , 1 tab(s) orally twice a week  · 	Xarelto 20 mg oral tablet: Last Dose Taken:  , 1 tab(s) orally once a day (in the evening)  Allergies:  	No Known Allergies:     PE    VSS  79 yo female WDWN in NAD    skin- warm,dry  HEENT- pupils equal, sclerae anicteric  Neck- no JVD  Lungs- clear  Cor- S1S2 irreg  Abd- + BS soft tender to deep palp mid epigastric, LLQ, no guarding, no rebound  Ext- no edema    < from: CT Abdomen and Pelvis w/ IV Cont (07.11.19 @ 09:45) >  IMPRESSION:     No pulmonary embolism.    There is mild pulmonary edema and bilateral pleural effusions.    Wall thickening of the gallbladder which may be secondary. This may be   confirmed on ultrasound.    Colonic diverticulosis without diverticulitis.      < end of copied text >
CHIEF COMPLAINT: Patient is a 78y old  Female who presents with a chief complaint of     HPI: 79 yo female biba from home for increasing dyspnea with sob that started at 1130pm last night. Pt has hx of diastolic chf. According to ems pt had resp arrest en route and needed to have bvm. on arrival pt is sob and dsypneic but awake, alert and oriented. c. pt has afib and is on xarelto.  she also described epigastric tightness       PMHx: PAST MEDICAL & SURGICAL HISTORY:  Afib  CHF (congestive heart failure)  Gastroesophageal reflux disease, esophagitis presence not specified  Essential hypertension  S/P right knee arthroscopy: 4/2006  H/O partial thyroidectomy: hx of Papillary Thyroid Ca s/p partial thyroidectomy 7/2014        Soc Hx:  no toxic habits       Allergies: Allergies    No Known Allergies    Intolerances          REVIEW OF SYSTEMS:    CONSTITUTIONAL: No weakness, fevers or chills  EYES/ENT: No visual changes;  No vertigo or throat pain   NECK: No pain or stiffness  RESPIRATORY: No cough, wheezing, hemoptysis; No shortness of breath  CARDIOVASCULAR: No chest pain or palpitations  GASTROINTESTINAL: No abdominal or epigastric pain. No nausea, vomiting, or hematemesis; No diarrhea or constipation. No melena or hematochezia.  GENITOURINARY: No dysuria, frequency or hematuria  NEUROLOGICAL: No numbness or weakness  SKIN: No itching, burning, rashes, or lesions   All other review of systems is negative unless indicated above    Vital Signs Last 24 Hrs  T(C): 37.5 (11 Jul 2019 04:06), Max: 37.5 (11 Jul 2019 04:06)  T(F): 99.5 (11 Jul 2019 04:06), Max: 99.5 (11 Jul 2019 04:06)  HR: 116 (11 Jul 2019 07:15) (93 - 132)  BP: 101/83 (11 Jul 2019 07:15) (101/83 - 154/97)  BP(mean): --  RR: 24 (11 Jul 2019 07:15) (24 - 32)  SpO2: 96% (11 Jul 2019 07:15) (95% - 100%)    I&O's Summary    10 Jul 2019 07:01  -  11 Jul 2019 07:00  --------------------------------------------------------  IN: 2000 mL / OUT: 0 mL / NET: 2000 mL            PHYSICAL EXAM:   Constitutional: NAD, awake and alert, well-developed  HEENT: PERR, EOMI, Normal Hearing, MMM  Neck: Soft and supple, No LAD, No JVD  Respiratory: Breath sounds are clear bilaterally, No wheezing, rales or rhonchi  Cardiovascular: S1 and S2, regular rate and rhythm, no Murmurs, gallops or rubs  Gastrointestinal: Bowel Sounds present, soft, nontender, nondistended, no guarding, no rebound  Extremities: No peripheral edema  Vascular: 2+ peripheral pulses  Neurological: A/O x 3, no focal deficits  Musculoskeletal: 5/5 strength b/l upper and lower extremities  Skin: No rashes    MEDICATIONS:  MEDICATIONS  (STANDING):      LABS: All Labs Reviewed:                        10.5   16.48 )-----------( 422      ( 11 Jul 2019 04:17 )             37.9     07-11    141  |  107  |  18  ----------------------------<  360<H>  4.1   |  19<L>  |  1.04    Ca    8.3<L>      11 Jul 2019 04:17  Mg     2.6     07-11    TPro  6.6  /  Alb  2.9<L>  /  TBili  0.3  /  DBili  x   /  AST  99<H>  /  ALT  61  /  AlkPhos  70  07-11    PT/INR - ( 11 Jul 2019 04:17 )   PT: 11.0 sec;   INR: 0.99 ratio         PTT - ( 11 Jul 2019 04:17 )  PTT:22.5 sec  CARDIAC MARKERS ( 11 Jul 2019 04:17 )  0.025 ng/mL / x     / x     / x     / x          Serum Pro-Brain Natriuretic Peptide: 3185 pg/mL (07-11 @ 04:17)        EKG: pending     Telemetry: AF with RVR     ECHO:< from: Transthoracic Echocardiogram (05.17.17 @ 10:44) >   Findings     Mitral Valve   Fibrocalcific changes noted to the mitral valve leaflets with preserved   leaflet excursion.   Moderate to severe mitral regurgitation is present.     Aortic Valve   The aortic valve is not well visualized. Valve opening seems to be   normal.     Tricuspid Valve   Moderate (2+) tricuspid valve regurgitation is present.   Mild pulmonary hypertension.     Pulmonic Valve   Pulmonic valve not well seen, probably normal pulmonic valve function.     Left Atrium   The left atrium is mildly dilated.     Left Ventricle   The left ventricle appears normal insize, wall thickness, wall motion   and   contractility as seen in limited views.   Estimated left ventricular ejection fraction is 55-60 %.     Right Atrium   The right atrium appears mildly dilated.     Right Ventricle   Normal appearing right ventricle structure and function.     Pericardial Effusion   A small pericardial effusion is present.     Pleural Effusion   Pleural effusion - is present..     Miscellaneous   The IVC is mildly dilated but collapsing.     Impression     Summary     Fibrocalcific changes noted to the mitral valve leaflets with preserved   leaflet excursion.   Moderate to severe mitral regurgitation is present.     Signature     ----------------------------------------------------------------   Electronically signed Stefanie Lunsford MD(Interpreting   physician) on 05/17/2017 01:21 PM   ----------------------------------------------------------------    < end of copied text >
Patient is 79yo female with PMhx of MR, CHF, Afib on A/C, presents from home with SOB. As per the son, around 2330, patient suddenly had SOB, tachypnea, and resp distress. Upon presentation patient placed on BIPAP with improvement of symptoms. Pt reports chills without fever, denies cough, palpitations, HA, dizziness. Of note patient endorses mild epigastric abd pain, without nausea vomiting or diarrhea, melena. No other constitutional symptoms.   In the ER was in rapid Afib with RVR, given Cardizem, IVF NS Bolus.   Lactate 9.8, repeat pending.     Pmhx CHF, Afib, MR  PShx NONE  Meds as per EMR  All NKDA  Fh NC  Social denies smoking, etoh, drug use    T(C): 37.5 (07-11-19 @ 04:06), Max: 37.5 (07-11-19 @ 04:06)  HR: 101 (07-11-19 @ 05:27) (93 - 132)  BP: 122/66 (07-11-19 @ 05:27) (122/66 - 154/97)  RR: 26 (07-11-19 @ 05:27) (26 - 32)  SpO2: 100% (07-11-19 @ 05:27) (95% - 100%)  Wt(kg): --      Gen: AAOx3, NAD  HEENT: NCAT, EOMI  Neck: Supple  CV: nml S1S2, RRR  Lungs: decreased breath sounds at the bases  Abd: Soft, mild epigastric tenderness ND, BS+  Ext: No edema  Neuro: Non focal  Skin: no rashes  Psych: normal affect    CARDIAC MARKERS ( 11 Jul 2019 04:17 )  0.025 ng/mL / x     / x     / x     / x                                10.5   16.48 )-----------( 422      ( 11 Jul 2019 04:17 )             37.9     11 Jul 2019 04:17    141    |  107    |  18     ----------------------------<  360    4.1     |  19     |  1.04     Ca    8.3        11 Jul 2019 04:17  Mg     2.6       11 Jul 2019 04:17    TPro  6.6    /  Alb  2.9    /  TBili  0.3    /  DBili  x      /  AST  99     /  ALT  61     /  AlkPhos  70     11 Jul 2019 04:17    PT/INR - ( 11 Jul 2019 04:17 )   PT: 11.0 sec;   INR: 0.99 ratio         PTT - ( 11 Jul 2019 04:17 )  PTT:22.5 sec  CAPILLARY BLOOD GLUCOSE        LIVER FUNCTIONS - ( 11 Jul 2019 04:17 )  Alb: 2.9 g/dL / Pro: 6.6 gm/dL / ALK PHOS: 70 U/L / ALT: 61 U/L / AST: 99 U/L / GGT: x
Patient is a 78y old  Female who presents with a chief complaint of shortness of breath (12 Jul 2019 15:26)      HPI:  78 year old woman with history of CHF, Afib on Xarelto, HTN BIBA with EMS for increasing SOB which started at 1130 pm last night. Patient was recently discharged from  for an admission for GI bleeding. Xarelto was held on dc. Patient had an outpt endoscopy 2 dyas ago and was told to restart back her Xarelto after the procedure.   Patient was taken to  ER via ambulance evening of 7/11.  She was found to be in respiratory distress with rapid afib with hypotension.  Currently, she is doing much better with less SOB.  She is awake and alert and answering all questions.    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 1000 milliLiter(s) (50 mL/Hr) IV Continuous <Continuous>  dextrose 50% Injectable 12.5 Gram(s) IV Push once  docusate sodium 100 milliGRAM(s) Oral three times a day  escitalopram 10 milliGRAM(s) Oral daily  fluticasone propionate 50 MICROgram(s)/spray Nasal Spray 2 Spray(s) Both Nostrils daily  insulin lispro (HumaLOG) corrective regimen sliding scale   SubCutaneous every 6 hours  magnesium oxide 400 milliGRAM(s) Oral daily  multivitamin 1 Tablet(s) Oral daily  nitroglycerin    2% Ointment 0.5 Inch(s) Transdermal every 6 hours  pantoprazole    Tablet 40 milliGRAM(s) Oral before breakfast  rivaroxaban 20 milliGRAM(s) Oral with dinner    MEDICATIONS  (PRN):  acetaminophen   Tablet .. 650 milliGRAM(s) Oral every 4 hours PRN Mild Pain (1 - 3)  dextrose 40% Gel 15 Gram(s) Oral once PRN Blood Glucose LESS THAN 70 milliGRAM(s)/deciliter  glucagon  Injectable 1 milliGRAM(s) IntraMuscular once PRN Glucose LESS THAN 70 milligrams/deciliter  ondansetron Injectable 4 milliGRAM(s) IV Push every 6 hours PRN Nausea  oxyCODONE    5 mG/acetaminophen 325 mG 1 Tablet(s) Oral every 4 hours PRN Moderate Pain (4 - 6)  senna 2 Tablet(s) Oral at bedtime PRN Constipation    Allergies  No Known Allergies    SOCIAL HISTORY: no tob, no etoh    FAMILY HISTORY:  No pertinent family history in first degree relatives      REVIEW OF SYSTEMS: abbreviated  cv- no chest pain  pulm - sob improved  gi - no abdominal pain or bleeding  leg edema  easy bruising  otherwise negative    Vital Signs Last 24 Hrs  T(C): 36.7 (12 Jul 2019 14:04), Max: 37.4 (12 Jul 2019 00:00)  T(F): 98.1 (12 Jul 2019 14:04), Max: 99.3 (12 Jul 2019 00:00)  HR: 91 (12 Jul 2019 15:30) (60 - 122)  BP: 104/58 (12 Jul 2019 15:30) (75/54 - 158/137)  BP(mean): 66 (12 Jul 2019 15:30) (36 - 142)  RR: 22 (12 Jul 2019 15:30) (18 - 31)  SpO2: 99% (12 Jul 2019 15:30) (87% - 100%)    PHYSICAL EXAM:    Constitutional: NAD  HEENT: NC/AT  Neck: No LAD  Respiratory: CTA  Cardiovascular: S1 and S2  Gastrointestinal: BS+, soft, NT/ND  Extremities: 1 + edema  Neurological: A/O x 3  Psychiatric: Normal mood, normal affect  Skin: No rashes    LABS:                        9.5    17.15 )-----------( 299      ( 12 Jul 2019 06:48 )             31.5     07-12    136  |  107  |  26<H>  ----------------------------<  138<H>  4.7   |  16<L>  |  1.41<H>    Ca    7.6<L>      12 Jul 2019 06:48  Mg     2.6     07-11    TPro  5.8<L>  /  Alb  2.5<L>  /  TBili  0.7  /  DBili  0.3<H>  /  AST  4473<H>  /  ALT  2724<H>  /  AlkPhos  61  07-12    PT/INR - ( 11 Jul 2019 04:17 )   PT: 11.0 sec;   INR: 0.99 ratio         PTT - ( 11 Jul 2019 04:17 )  PTT:22.5 sec  LIVER FUNCTIONS - ( 12 Jul 2019 06:48 )  Alb: 2.5 g/dL / Pro: 5.8 gm/dL / ALK PHOS: 61 U/L / ALT: 2724 U/L / AST: 4473 U/L / GGT: x             RADIOLOGY & ADDITIONAL STUDIES:
Patient is a 78y old  Female who presents with a chief complaint of shortness of breath (2019 11:52)    HPI:  78 year old woman with history of CHF, Afib on Xarelto, HTN, GI bleed admitted on  for evaluation of increased shortness of breath which began overnite; the patient noted with chest pain and abdominal pain; had recent endoscopy as well as recent admission for GI bleed. En route to hospital was in respiratory arrest and upon admission a rapid afib with hypotension. The patient noted with high lactate and concern for sepsis. History per medical record as patient unable to provide history.          PMH- Afib, HTN. CHF, GI Bleeding  PSH H/O partial thyroidectomy hx of Papillary Thyroid Ca s/p partial thyroidectomy 2014  S/P right knee arthroscopy 2006.         PMH: as above  PSH: as above  Meds: per reconciliation sheet, noted below  MEDICATIONS  (STANDING):  amiodarone Infusion 1 mG/Min (33.333 mL/Hr) IV Continuous <Continuous>  amiodarone Infusion 0.5 mG/Min (16.667 mL/Hr) IV Continuous <Continuous>  ascorbic acid 500 milliGRAM(s) Oral daily  atorvastatin 20 milliGRAM(s) Oral at bedtime  cefepime  Injectable. 1000 milliGRAM(s) IV Push every 12 hours  dextrose 5%. 1000 milliLiter(s) (50 mL/Hr) IV Continuous <Continuous>  dextrose 50% Injectable 12.5 Gram(s) IV Push once  docusate sodium 100 milliGRAM(s) Oral three times a day  escitalopram 10 milliGRAM(s) Oral daily  fluticasone propionate 50 MICROgram(s)/spray Nasal Spray 2 Spray(s) Both Nostrils daily  insulin lispro (HumaLOG) corrective regimen sliding scale   SubCutaneous three times a day before meals  insulin lispro (HumaLOG) corrective regimen sliding scale   SubCutaneous at bedtime  magnesium oxide 400 milliGRAM(s) Oral daily  multivitamin 1 Tablet(s) Oral daily  pantoprazole    Tablet 40 milliGRAM(s) Oral before breakfast  rivaroxaban 20 milliGRAM(s) Oral with dinner    MEDICATIONS  (PRN):  acetaminophen   Tablet .. 650 milliGRAM(s) Oral every 4 hours PRN Mild Pain (1 - 3)  dextrose 40% Gel 15 Gram(s) Oral once PRN Blood Glucose LESS THAN 70 milliGRAM(s)/deciliter  glucagon  Injectable 1 milliGRAM(s) IntraMuscular once PRN Glucose LESS THAN 70 milligrams/deciliter  ondansetron Injectable 4 milliGRAM(s) IV Push every 6 hours PRN Nausea  oxyCODONE    5 mG/acetaminophen 325 mG 1 Tablet(s) Oral every 4 hours PRN Moderate Pain (4 - 6)  senna 2 Tablet(s) Oral at bedtime PRN Constipation    Allergies    No Known Allergies    Intolerances      Social: no smoking, no alcohol, no illegal drugs; no recent travel, no exposure to TB  FAMILY HISTORY:  No pertinent family history in first degree relatives     no history of premature cardiovascular disease in first degree relatives  ROS: unable to obtain secondary to patient medical condition     Vital Signs Last 24 Hrs  T(C): 36.3 (2019 11:22), Max: 37.5 (2019 04:06)  T(F): 97.3 (2019 11:22), Max: 99.5 (2019 04:06)  HR: 108 (2019 15:00) (93 - 133)  BP: 102/62 (2019 15:00) (80/51 - 166/82)  BP(mean): 71 (2019 15:00) (33 - 85)  RR: 29 (2019 15:00) (18 - 32)  SpO2: 98% (2019 15:00) (95% - 100%)  Daily Height in cm: 154.94 (2019 03:52)    Daily Weight in k.2 (2019 11:22)    PE:    Constitutional: frail looking  HEENT: NC/AT, EOMI, PERRLA, conjunctivae clear; ears and nose atraumatic; pharynx clear  Neck: supple; thyroid not palpable  Back: no tenderness  Respiratory: respiratory effort normal; scattered wheeze  Cardiovascular: S1S2 regular, no murmurs  Abdomen: soft, not tender, not distended, positive BS; no liver or spleen organomegaly  Genitourinary: no suprapubic tenderness  Musculoskeletal: no muscle tenderness, lower extremity edema  Neurological/ Psychiatric:  moving all extremities  Skin: no rashes; no palpable lesions    Labs: all available labs reviewed                        10.5   16.48 )-----------( 422      ( 2019 04:17 )             37.9     07-11    141  |  107  |  18  ----------------------------<  360<H>  4.1   |  19<L>  |  1.04    Ca    8.3<L>      2019 04:17  Mg     2.6         TPro  6.6  /  Alb  2.9<L>  /  TBili  0.3  /  DBili  x   /  AST  99<H>  /  ALT  61  /  AlkPhos  70       LIVER FUNCTIONS - ( 2019 04:17 )  Alb: 2.9 g/dL / Pro: 6.6 gm/dL / ALK PHOS: 70 U/L / ALT: 61 U/L / AST: 99 U/L / GGT: x           Urinalysis Basic - ( 2019 06:45 )    Color: Yellow / Appearance: Clear / S.010 / pH: x  Gluc: x / Ketone: Negative  / Bili: Negative / Urobili: Negative mg/dL   Blood: x / Protein: 30 mg/dL / Nitrite: Negative   Leuk Esterase: Negative / RBC: 0-2 /HPF / WBC 3-5   Sq Epi: x / Non Sq Epi: Few / Bacteria: Few        Lactate, Blood (19 @ 10:42)    Lactate, Blood: 4.2: Test Name:lact  Called by:lyudmila  Called to:  shmuel denney  Read back 2 Pt IDs:y Read back values:y Date/Tm:19 11:12 mmol/L        Lactate, Blood (19 @ 04:29)    Lactate, Blood: 9.8: Test Name:Lact  Called by:meghna  Called to:LIZET Monae  Read back 2 Pt IDs:y Read back values:y Date/Tm:19 05:08 mmol/L        < from: CT Abdomen and Pelvis w/ IV Cont (19 @ 09:45) >    EXAM:  CT ABDOMEN AND PELVIS IC                          EXAM:  CTA CHEST PE PROTOCOL (W)AW IC                            PROCEDURE DATE:  2019          INTERPRETATION:  CLINICAL INFORMATION: Shortness of breath, chest pain,   recent EGD.    COMPARISON: Chest CT 2018.    PROCEDURE:   CT Angiography of the Chest was performed followed by portal venous phase   imaging of the Abdomen and Pelvis.  IV Contrast: 90 ml of Omnipaque 350 was injected intravenously. 10 ml   were discarded.  Oral contrast: None.  Sagittal and coronal reformats were performed as well as 3D (MIP)   reconstructions.    FINDINGS:    CHEST:     LUNGS AND LARGE AIRWAYS: Patent central airways. There is compressive   atelectasis of the posterior lower lobes relatedto pleural effusions.   There is interlobular septal thickening with areas of groundglass   attenuation, likely representing pulmonary edema. Note that motion limits   assessment for pulmonary nodules.  PLEURA: There are bilateral pleural effusions, moderate on the right and   small on the left.  VESSELS: The thoracic aorta and main pulmonary artery are normal in   caliber. There is no pulmonary embolism. Note however that assessment of   the distal segmental to subsegmental vessels are limited due to motion.  HEART: Cardiomegaly. No pericardial effusion.  MEDIASTINUM AND KILO: No lymphadenopathy.  CHEST WALL AND LOWER NECK: The left thyroid lobe is within normal limits.   The right thyroid lobe is not definitively identified. There is no   supraclavicular lymphadenopathy. There is no axillary adenopathy.    ABDOMEN AND PELVIS:    LIVER: Within normal limits. There is a 2.8 x 1.8 cm cyst in the right   hepatic lobe. A subcentimeter hypodensity present in the lateral segment   of the left lobe, too small to further characterize.  BILE DUCTS: Normal caliber.  GALLBLADDER: There is edema of the gallbladder wall, which may be   secondary. If indicated, an ultrasound may be performed for further   assessment.  SPLEEN: Within normal limits.  PANCREAS: Within normal limits.  ADRENALS: Within normal limits.  KIDNEYS/URETERS: There is a 1.7 x 1.6 cm cyst at the upper pole of the   right kidney.    BLADDER: Within normal limits.  REPRODUCTIVE ORGANS: The uterus is present. The adnexa appear  unremarkable on CT.    BOWEL: No bowel obstruction. Appendix is not definitively identified.   There is colonic diverticulosis without evidence for diverticulitis.  PERITONEUM: No ascites.  VESSELS:  The abdominal aorta and IVC are normal in caliber. There is   mild atherosclerosis of the aorta.  RETROPERITONEUM: No lymphadenopathy.    ABDOMINAL WALL: There are multiple injection granulomas in the left   buttock.  BONES: There is multilevel degenerative change of the thoracolumbar   spine. Hardware is present in the lumbar spine related to laminectomy.    IMPRESSION:     No pulmonary embolism.    There is mild pulmonary edema and bilateral pleural effusions.    Wall thickening of the gallbladder which may be secondary. This may be   confirmed on ultrasound.    Colonic diverticulosis without diverticulitis.    Other incidental findings are as detailed above.      < end of copied text >          Radiology: all available radiological tests reviewed    Advanced directives addressed: full resuscitation

## 2019-07-12 NOTE — PROVIDER CONTACT NOTE (CRITICAL VALUE NOTIFICATION) - ACTION/TREATMENT ORDERED:
Lactate 6.7 MD Batres and MD Carranza made aware. No new orders received. Lactate 6.7 MD Batres and MD Carranza made aware. Orders received.

## 2019-07-12 NOTE — CONSULT NOTE ADULT - CONSULT REQUESTED DATE/TIME
11-Jul-2019 06:21
11-Jul-2019 07:30
12-Jul-2019
12-Jul-2019 15:37
11-Jul-2019 15:11
12-Jul-2019 15:06

## 2019-07-12 NOTE — H&P ADULT - NSICDXPASTMEDICALHX_GEN_ALL_CORE_FT
PAST MEDICAL HISTORY:  Afib     CHF (congestive heart failure)     Essential hypertension     Gastroesophageal reflux disease, esophagitis presence not specified     Gastrointestinal hemorrhage associated with intestinal diverticulosis

## 2019-07-12 NOTE — CONSULT NOTE ADULT - ASSESSMENT
78y Female whom presented to the hospital with CHF, Afib on Xarelto, HTN BIBA with EMS for increasing SOB which started night prior to admit, renal evaluation of NORM. NORM likely multifactorial which reflects decrement in cardiac function, contrast on admission likely playing a role as well. ATN related to hypotension also contributory.    NORM-Non oliguric  -Can expect further rise in function with contrast load and soft bp  -Agree with hold of fluid, lasix IV if bp can tolerate  -Urine studies/lytes  -Patient is higher risk of NORM if repeat contrast load is needed  -Labs in AM  -Maintain strict I's and O's    CVS  -Drop in EF, ccu/CVS following  -maintain even to slight negative if can tolerate      Thank you for the courtesy of the consult, will follow with you 78y Female whom presented to the hospital with CHF, Afib on Xarelto, HTN BIBA with EMS for increasing SOB which started night prior to admit, renal evaluation of NORM. NORM likely multifactorial which reflects decrement in cardiac function, contrast on admission likely playing a role as well. ATN related to hypotension also contributory.    NORM-Non oliguric  -Can expect further rise in function with contrast load and soft bp  -Agree with hold of fluid, lasix IV if bp can tolerate  -Urine studies/lytes  -Patient is higher risk of NORM if repeat contrast load is needed  -Labs in AM  -Maintain strict I's and O's  -No need for repeat renal imaging with recent ct    CVS  -Drop in EF, ccu/CVS following  -maintain even to slight negative if can tolerate lasix    Elevated wbc/Lactate  -ID/F/u cultures  -Maintain MAP      Thank you for the courtesy of the consult, will follow with you  d/c with RN staff and Dr Cooney

## 2019-07-12 NOTE — CONSULT NOTE ADULT - ASSESSMENT
77yo female admitted with SOB, rapid afib, CHF, hypotension  GI consulted for markedly elevated LFTs  pattern c/w shock liver  do not feel that biliary disease or minimal sono findings contributing to overall course  follow lfts - expect alk phos and bilirubin may rise over next few day as transaminases decline  d/w pt and family in detail

## 2019-07-12 NOTE — DISCHARGE NOTE NURSING/CASE MANAGEMENT/SOCIAL WORK - NSDCDPATPORTLINK_GEN_ALL_CORE
You can access the [facility] Portal, offered by [facility], by registering with the following website: [email or web address]

## 2019-07-12 NOTE — ADVANCED PRACTICE NURSE CONSULT - ASSESSMENT
Patient is awake and alert. Midline catheter ordered for access. Midline insertion along with risks, benefits, possible complications and infection prevention explained to pt. Family at bedside and all questions addressed. Time out performed. Verbal consent to place Midline and hand washing by caregiver done. Left arm cleansed with CHG. Using sterile technique, under ultrasound guidance, placed Arrow Endurance Midline 20g/8cm (lot # [id number]) into left cephalic vein. Brisk blood return and flushed with 10ml NS. Minimal blood loss and all sharps accounted for. Dressing and end cap in place. Report given to RN.

## 2019-07-12 NOTE — H&P ADULT - NSHPLABSRESULTS_GEN_ALL_CORE
CBC Full  -  ( 2019 19:58 )  WBC Count : 11.4 K/uL  RBC Count : 3.50 M/uL  Hemoglobin : 9.0 g/dL  Hematocrit : 28.1 %  Platelet Count - Automated : 213 K/uL  Mean Cell Volume : 80.1 fl  Mean Cell Hemoglobin : 25.8 pg  Mean Cell Hemoglobin Concentration : 32.2 gm/dL  Auto Neutrophil # : 9.6 K/uL  Auto Lymphocyte # : 0.8 K/uL  Auto Monocyte # : 0.9 K/uL  Auto Eosinophil # : 0.1 K/uL  Auto Basophil # : 0.0 K/uL  Auto Neutrophil % : 84.4 %  Auto Lymphocyte % : 7.0 %  Auto Monocyte % : 7.9 %  Auto Eosinophil % : 0.5 %  Auto Basophil % : 0.2 %          137  |  100  |  31<H>  ----------------------------<  104<H>  4.2   |  20<L>  |  1.27      136  |  107  |  26<H>  ----------------------------<  138<H>  4.7   |  16<L>  |  1.41<H>      136  |  104  |  22  ----------------------------<  135<H>  4.6   |  19<L>  |  1.08    Ca    7.6<L>      2019 19:58  Ca    7.6<L>      2019 06:48  Ca    8.2<L>      2019 22:27  Phos  3.6       Mg     2.3         TPro  5.4<L>  /  Alb  3.2<L>  /  TBili  0.6  /  DBili  x   /  AST  2914<H>  /  ALT  2378<H>  /  AlkPhos  61  12  TPro  5.8<L>  /  Alb  2.5<L>  /  TBili  0.7  /  DBili  0.3<H>  /  AST  4473<H>  /  ALT  2724<H>  /  AlkPhos  61  -12  TPro  6.7  /  Alb  2.9<L>  /  TBili  0.8  /  DBili  x   /  AST  1189<H>  /  ALT  978<H>  /  AlkPhos  71  07-11      LIVER FUNCTIONS - ( 2019 19:58 )  Alb: 3.2 g/dL / Pro: 5.4 g/dL / ALK PHOS: 61 U/L / ALT: 2378 U/L / AST: 2914 U/L / GGT: x             PT/INR - ( 2019 19:58 )   PT: 45.0 sec;   INR: 3.76 ratio         PTT - ( 2019 19:58 )  PTT:34.4 sec    ABG - ( 2019 06:00 )  pH, Arterial: 7.37  pH, Blood: x     /  pCO2: 34    /  pO2: 181   / HCO3: 19    / Base Excess: -5.3  /  SaO2: 98                    Culture - Blood (collected 2019 04:29)  Source: .Blood Blood-Peripheral  Preliminary Report (2019 09:01):    No growth to date.    Culture - Blood (collected 2019 04:29)  Source: .Blood Blood-Peripheral  Preliminary Report (2019 09:01):    No growth to date.        Urinalysis Basic - ( 2019 06:45 )    Color: Yellow / Appearance: Clear / S.010 / pH: x  Gluc: x / Ketone: Negative  / Bili: Negative / Urobili: Negative mg/dL   Blood: x / Protein: 30 mg/dL / Nitrite: Negative   Leuk Esterase: Negative / RBC: 0-2 /HPF / WBC 3-5   Sq Epi: x / Non Sq Epi: Few / Bacteria: Few

## 2019-07-12 NOTE — H&P ADULT - HISTORY OF PRESENT ILLNESS
78 year old woman with Afib on Xarelto, HTN presented to OSH for SOB now transferred to [facility] for newly diagnosed acute decompensated systolic heart failure.      At the OSH, patient was found to be in afib with RVR and was given IV cardizem 10mg.  Patient was also loaded with digoxin. 78 year old woman with Afib on Xarelto, HTN presented to OSH for SOB now transferred to [facility] for newly diagnosed acute decompensated systolic heart failure.      At the OSH, patient was found to be in afib with RVR and was given IV cardizem 10mg.  Patient was also loaded with digoxin.  Patient's lactate was 9.8 and received 2L IVF bolus.  Patient had TTE done which showed EF 20-25%, mod MR, mod TR, mod diffuse hypokinesis of LV.  She had an echo done in 2017 which showed EF 55-60%. Patient was given a few doses of IV lasix 40mg but was hypotensive also. Patient was never placed on pressor support.  Patient was also started on amio load (unclear how much she received). Patient presented with leukocytosis of 16-17 but was afebrile.  There was concern for HAP and patient was started on vanc/cefepime.  Blood cx from 07/11 were negative. Patient had a CT angio which was negative for PE but b/l pleural effusions and CT abd/pelvis which showed gallbladder wall thickening and colonic diverticulossi without diverticulitis.  Abd US was done which showed cholithiasis and severe diffuse gallbladder wall thickening.  Surgery was consulted and recommended HIDA scan but no surgical intervention.  Over the course of the hospital, patient developed an NORM (1.41 from 0.85 on admission) and shock liver (AST 4473, ALT 2724, from normal levels.) Lactate trended down to 3.1.     Patient transferred to [facility] for possible candidate for invasive cardiac transport. 78 year old woman with Afib on Xarelto, HTN presented to OSH for SOB now transferred to [facility] for newly diagnosed acute decompensated systolic heart failure.      At the OSH, patient was found to be in afib with RVR and was given IV cardizem 10mg.  Patient was also loaded with digoxin.  Patient's lactate was 9.8 and received 2L IVF bolus.  Patient had TTE done which showed EF 20-25%, mod MR, mod TR, mod diffuse hypokinesis of LV.  She had an echo done in 2017 which showed EF 55-60%. Patient was given a few doses of IV lasix 40mg (last dose 7/11 at 2PM) but was hypotensive at 7/12 at 2 AM to 75/54 and was given 1L bolus then also and SBP in the 90s.  Patient did not get diuresis afterwards.  Patient was never placed on pressor support.  Patient was also started on amio load. Patient presented with leukocytosis of 16-17 but was afebrile.  There was concern for HAP and patient was started on vanc/cefepime.  Blood cx from 07/11 were negative. Patient had a CT angio which was negative for PE but b/l pleural effusions and CT abd/pelvis which showed gallbladder wall thickening and colonic diverticulossi without diverticulitis.  Abd US was done which showed cholithiasis and severe diffuse gallbladder wall thickening.  Surgery was consulted and recommended HIDA scan but no surgical intervention.  Over the course of the hospital, patient developed an NORM (1.41 from 0.85 on admission) and shock liver (AST 4473, ALT 2724, from normal levels.) Lactate trended down to 3.1. Patient transferred to [facility] for possible candidate for invasive cardiac transport.     At [facility], patient in fluid overload state and currently on 2L NC.  History is significant for progressive SOB since 01/19.  Patient would take a few steps and become SOB.  Patient does see a cardiologist Dr. Cerda as outpatient and started her on furosemide 40mg three times a week. Per family, patient's other outpatient medications included valsartan, HCTZ, cardizem but patient does not have a list of her medications. Patient did not receive an echo since 2017 and has never had a LHC/RHC.  Currently, patient endorses nausea and acid reflux and bloating.  Patient also complains of cough.  Has mild SOB but denies any chest pain, lightheadedness, dizziness.

## 2019-07-12 NOTE — PROVIDER CONTACT NOTE (CHANGE IN STATUS NOTIFICATION) - SITUATION
Dr. Gonzales called and made aware of pt.'s increasing abdominal pain, pt. received Morphine 2mg earlier with minimal effect. Pt. with increased RR, restless and decreasing oxygen saturation. Chest xray obtained   and intensivest consult called. Amio drip d/c as b/p low . NS bolus 1 litre started and maintenance at 75ml/hr.Stat labs obtained and

## 2019-07-12 NOTE — PROGRESS NOTE ADULT - ASSESSMENT
78 F with AF  on AC, recent admission for symoptomatic anemia s/p transfusion( no obvious GI source of bleedining onrecent scope) presentswith worseing shortness of breath and epigastric pain    very elevated lactate upon presentation, a low flow state was treated with fluids and her hemodynamic stability has improved- now with elevated creatine and LFT -- due to shock state     recommend empiric abx ( elevated WBC) - althoguh WBC elevation is likley a stress response and not infectious     recommended serial CE with CPK-- so far minimal elevation not consistent with MI    Echo reveals significant reduction in EF compared to 2017--- and with an EKG change from a week ago, and absence of significant elevation in CE, favor non ischemic myopathy  - tachy induced, viral, ifectious, medication??  anesthesia during scope?    recommend follow up echo today to re evalaute LV fucntion     favor supportive care at this time, recommend renal eval andf GI follow up 78 F with AF  on AC, recent admission for symoptomatic anemia s/p transfusion( no obvious GI source of bleedining onrecent scope) presentswith worseing shortness of breath and epigastric pain    very elevated lactate upon presentation, a low flow state was treated with fluids and her hemodynamic stability has improved- now with elevated creatine and LFT -- due to shock state     recommend empiric abx ( elevated WBC) - althoguh WBC elevation is likley a stress response and not infectious     recommended serial CE with CPK-- so far minimal elevation not consistent with MI    Echo reveals significant reduction in EF compared to 2017--- and with an EKG change from a week ago, and absence of significant elevation in CE, favor non ischemic myopathy  - tachy induced, viral, ifectious, medication??  anesthesia during scope?    recommend follow up echo today to re evalaute LV fucntion     favor supportive care at this time, recommend renal eval andf GI follow up      given elevation in LFT- recommend holding amio

## 2019-07-12 NOTE — PROGRESS NOTE ADULT - SUBJECTIVE AND OBJECTIVE BOX
called to see patient for inc RR and inc WOB. Her pulse Lg=880% NC. She had an initial Lactate 9.8. It was felt to be secondary to hypoxia and low flow state,  She has been having inc RUQ / LUQ abdominal pain.   CT scan showed  wall thickening of the gallbladder.  She is on a Amio gtt and her BP has dropped from  to 75/42.  She has been on Lasix and received Lopressor earlier.  Lactate is trending up, BP trending down, Inc LFT's.  Echo reviewed      A  AF with RVR - rate controlled  Inc Lactate - Secondary to Hypoxia / Low Flow  Septic Shock with - Dec BP / Inc Lactate / Inc LFT's    Plan:  Critically Ill  Now low BP  IVF Fluids + Maintenance  May need pressors for BP support  DC Lopressor / Amio gtt  Trend Lactate  STAT Abd Sono = - r/o Acute Cholecystitis  Surgery Consult - Call placed to Dr. Hauser  Keep NPO  Cont IV Abx  Monitor renal function  Strict I/O's - Place Gallegos  DVT prophylaxis - On Xarelto  Plan discussed with patient at bedside with all questions answered in detail  Case d/w Dr. Batres    Additional 45-min CC time

## 2019-07-13 PROBLEM — K57.91 DIVERTICULOSIS OF INTESTINE, PART UNSPECIFIED, WITHOUT PERFORATION OR ABSCESS WITH BLEEDING: Chronic | Status: ACTIVE | Noted: 2019-07-11

## 2019-07-13 LAB
ALBUMIN SERPL ELPH-MCNC: 3.1 G/DL — LOW (ref 3.3–5)
ALBUMIN SERPL ELPH-MCNC: 3.2 G/DL — LOW (ref 3.3–5)
ALBUMIN SERPL ELPH-MCNC: 3.3 G/DL — SIGNIFICANT CHANGE UP (ref 3.3–5)
ALP SERPL-CCNC: 61 U/L — SIGNIFICANT CHANGE UP (ref 40–120)
ALP SERPL-CCNC: 70 U/L — SIGNIFICANT CHANGE UP (ref 40–120)
ALP SERPL-CCNC: 72 U/L — SIGNIFICANT CHANGE UP (ref 40–120)
ALT FLD-CCNC: 1783 U/L — HIGH (ref 10–45)
ALT FLD-CCNC: 1856 U/L — HIGH (ref 10–45)
ALT FLD-CCNC: 2161 U/L — HIGH (ref 10–45)
ANION GAP SERPL CALC-SCNC: 13 MMOL/L — SIGNIFICANT CHANGE UP (ref 5–17)
ANION GAP SERPL CALC-SCNC: 15 MMOL/L — SIGNIFICANT CHANGE UP (ref 5–17)
ANION GAP SERPL CALC-SCNC: 16 MMOL/L — SIGNIFICANT CHANGE UP (ref 5–17)
APTT BLD: 102.4 SEC — HIGH (ref 27.5–36.3)
APTT BLD: 51.3 SEC — HIGH (ref 27.5–36.3)
APTT BLD: 61.5 SEC — HIGH (ref 27.5–36.3)
APTT BLD: 69.5 SEC — HIGH (ref 27.5–36.3)
AST SERPL-CCNC: 1060 U/L — HIGH (ref 10–40)
AST SERPL-CCNC: 1724 U/L — HIGH (ref 10–40)
AST SERPL-CCNC: 884 U/L — HIGH (ref 10–40)
BASOPHILS # BLD AUTO: 0 K/UL — SIGNIFICANT CHANGE UP (ref 0–0.2)
BASOPHILS # BLD AUTO: 0 K/UL — SIGNIFICANT CHANGE UP (ref 0–0.2)
BASOPHILS NFR BLD AUTO: 0.2 % — SIGNIFICANT CHANGE UP (ref 0–2)
BASOPHILS NFR BLD AUTO: 0.3 % — SIGNIFICANT CHANGE UP (ref 0–2)
BILIRUB SERPL-MCNC: 0.6 MG/DL — SIGNIFICANT CHANGE UP (ref 0.2–1.2)
BILIRUB SERPL-MCNC: 0.7 MG/DL — SIGNIFICANT CHANGE UP (ref 0.2–1.2)
BILIRUB SERPL-MCNC: 0.8 MG/DL — SIGNIFICANT CHANGE UP (ref 0.2–1.2)
BUN SERPL-MCNC: 25 MG/DL — HIGH (ref 7–23)
BUN SERPL-MCNC: 27 MG/DL — HIGH (ref 7–23)
BUN SERPL-MCNC: 30 MG/DL — HIGH (ref 7–23)
CALCIUM SERPL-MCNC: 7.7 MG/DL — LOW (ref 8.4–10.5)
CALCIUM SERPL-MCNC: 7.7 MG/DL — LOW (ref 8.4–10.5)
CALCIUM SERPL-MCNC: 7.8 MG/DL — LOW (ref 8.4–10.5)
CHLORIDE SERPL-SCNC: 102 MMOL/L — SIGNIFICANT CHANGE UP (ref 96–108)
CHLORIDE SERPL-SCNC: 96 MMOL/L — SIGNIFICANT CHANGE UP (ref 96–108)
CHLORIDE SERPL-SCNC: 97 MMOL/L — SIGNIFICANT CHANGE UP (ref 96–108)
CHLORIDE UR-SCNC: <20 MMOL/L — SIGNIFICANT CHANGE UP
CHOLEST SERPL-MCNC: 112 MG/DL — SIGNIFICANT CHANGE UP (ref 10–199)
CO2 SERPL-SCNC: 23 MMOL/L — SIGNIFICANT CHANGE UP (ref 22–31)
CO2 SERPL-SCNC: 24 MMOL/L — SIGNIFICANT CHANGE UP (ref 22–31)
CO2 SERPL-SCNC: 26 MMOL/L — SIGNIFICANT CHANGE UP (ref 22–31)
CREAT SERPL-MCNC: 0.86 MG/DL — SIGNIFICANT CHANGE UP (ref 0.5–1.3)
CREAT SERPL-MCNC: 0.89 MG/DL — SIGNIFICANT CHANGE UP (ref 0.5–1.3)
CREAT SERPL-MCNC: 1.15 MG/DL — SIGNIFICANT CHANGE UP (ref 0.5–1.3)
EOSINOPHIL # BLD AUTO: 0.1 K/UL — SIGNIFICANT CHANGE UP (ref 0–0.5)
EOSINOPHIL # BLD AUTO: 0.1 K/UL — SIGNIFICANT CHANGE UP (ref 0–0.5)
EOSINOPHIL NFR BLD AUTO: 0.9 % — SIGNIFICANT CHANGE UP (ref 0–6)
EOSINOPHIL NFR BLD AUTO: 1.6 % — SIGNIFICANT CHANGE UP (ref 0–6)
GLUCOSE SERPL-MCNC: 114 MG/DL — HIGH (ref 70–99)
GLUCOSE SERPL-MCNC: 123 MG/DL — HIGH (ref 70–99)
GLUCOSE SERPL-MCNC: 136 MG/DL — HIGH (ref 70–99)
HCT VFR BLD CALC: 26.4 % — LOW (ref 34.5–45)
HCT VFR BLD CALC: 29.7 % — LOW (ref 34.5–45)
HDLC SERPL-MCNC: 49 MG/DL — LOW
HGB BLD-MCNC: 9 G/DL — LOW (ref 11.5–15.5)
HGB BLD-MCNC: 9.8 G/DL — LOW (ref 11.5–15.5)
INR BLD: 1.52 RATIO — HIGH (ref 0.88–1.16)
INR BLD: 2.66 RATIO — HIGH (ref 0.88–1.16)
LACTATE SERPL-SCNC: 1.7 MMOL/L — SIGNIFICANT CHANGE UP (ref 0.7–2)
LIPID PNL WITH DIRECT LDL SERPL: 41 MG/DL — SIGNIFICANT CHANGE UP
LYMPHOCYTES # BLD AUTO: 0.6 K/UL — LOW (ref 1–3.3)
LYMPHOCYTES # BLD AUTO: 0.7 K/UL — LOW (ref 1–3.3)
LYMPHOCYTES # BLD AUTO: 6.8 % — LOW (ref 13–44)
LYMPHOCYTES # BLD AUTO: 7.5 % — LOW (ref 13–44)
MAGNESIUM SERPL-MCNC: 2 MG/DL — SIGNIFICANT CHANGE UP (ref 1.6–2.6)
MAGNESIUM SERPL-MCNC: 2.1 MG/DL — SIGNIFICANT CHANGE UP (ref 1.6–2.6)
MAGNESIUM SERPL-MCNC: 2.3 MG/DL — SIGNIFICANT CHANGE UP (ref 1.6–2.6)
MCHC RBC-ENTMCNC: 26.5 PG — LOW (ref 27–34)
MCHC RBC-ENTMCNC: 27.4 PG — SIGNIFICANT CHANGE UP (ref 27–34)
MCHC RBC-ENTMCNC: 32.9 GM/DL — SIGNIFICANT CHANGE UP (ref 32–36)
MCHC RBC-ENTMCNC: 34.3 GM/DL — SIGNIFICANT CHANGE UP (ref 32–36)
MCV RBC AUTO: 80.1 FL — SIGNIFICANT CHANGE UP (ref 80–100)
MCV RBC AUTO: 80.5 FL — SIGNIFICANT CHANGE UP (ref 80–100)
MONOCYTES # BLD AUTO: 0.7 K/UL — SIGNIFICANT CHANGE UP (ref 0–0.9)
MONOCYTES # BLD AUTO: 0.7 K/UL — SIGNIFICANT CHANGE UP (ref 0–0.9)
MONOCYTES NFR BLD AUTO: 7.6 % — SIGNIFICANT CHANGE UP (ref 2–14)
MONOCYTES NFR BLD AUTO: 7.9 % — SIGNIFICANT CHANGE UP (ref 2–14)
NEUTROPHILS # BLD AUTO: 7.6 K/UL — HIGH (ref 1.8–7.4)
NEUTROPHILS # BLD AUTO: 7.7 K/UL — HIGH (ref 1.8–7.4)
NEUTROPHILS NFR BLD AUTO: 83.4 % — HIGH (ref 43–77)
NEUTROPHILS NFR BLD AUTO: 83.7 % — HIGH (ref 43–77)
PHOSPHATE SERPL-MCNC: 1.6 MG/DL — LOW (ref 2.5–4.5)
PHOSPHATE SERPL-MCNC: 1.9 MG/DL — LOW (ref 2.5–4.5)
PHOSPHATE SERPL-MCNC: 2.9 MG/DL — SIGNIFICANT CHANGE UP (ref 2.5–4.5)
PLATELET # BLD AUTO: 180 K/UL — SIGNIFICANT CHANGE UP (ref 150–400)
PLATELET # BLD AUTO: 194 K/UL — SIGNIFICANT CHANGE UP (ref 150–400)
POTASSIUM SERPL-MCNC: 3.4 MMOL/L — LOW (ref 3.5–5.3)
POTASSIUM SERPL-MCNC: 3.9 MMOL/L — SIGNIFICANT CHANGE UP (ref 3.5–5.3)
POTASSIUM SERPL-MCNC: 4 MMOL/L — SIGNIFICANT CHANGE UP (ref 3.5–5.3)
POTASSIUM SERPL-SCNC: 3.4 MMOL/L — LOW (ref 3.5–5.3)
POTASSIUM SERPL-SCNC: 3.9 MMOL/L — SIGNIFICANT CHANGE UP (ref 3.5–5.3)
POTASSIUM SERPL-SCNC: 4 MMOL/L — SIGNIFICANT CHANGE UP (ref 3.5–5.3)
PROT SERPL-MCNC: 5.5 G/DL — LOW (ref 6–8.3)
PROT SERPL-MCNC: 5.8 G/DL — LOW (ref 6–8.3)
PROT SERPL-MCNC: 6 G/DL — SIGNIFICANT CHANGE UP (ref 6–8.3)
PROTHROM AB SERPL-ACNC: 17.6 SEC — HIGH (ref 10–12.9)
PROTHROM AB SERPL-ACNC: 31.5 SEC — HIGH (ref 10–12.9)
RBC # BLD: 3.29 M/UL — LOW (ref 3.8–5.2)
RBC # BLD: 3.69 M/UL — LOW (ref 3.8–5.2)
RBC # FLD: 17.1 % — HIGH (ref 10.3–14.5)
RBC # FLD: 17.1 % — HIGH (ref 10.3–14.5)
RH IG SCN BLD-IMP: POSITIVE — SIGNIFICANT CHANGE UP
SODIUM SERPL-SCNC: 137 MMOL/L — SIGNIFICANT CHANGE UP (ref 135–145)
SODIUM SERPL-SCNC: 137 MMOL/L — SIGNIFICANT CHANGE UP (ref 135–145)
SODIUM SERPL-SCNC: 138 MMOL/L — SIGNIFICANT CHANGE UP (ref 135–145)
SODIUM UR-SCNC: <20 MMOL/L — SIGNIFICANT CHANGE UP
T4 AB SER-ACNC: 5.8 UG/DL — SIGNIFICANT CHANGE UP (ref 4.6–12)
TOTAL CHOLESTEROL/HDL RATIO MEASUREMENT: 2.3 RATIO — LOW (ref 3.3–7.1)
TRIGL SERPL-MCNC: 112 MG/DL — SIGNIFICANT CHANGE UP (ref 10–149)
TSH SERPL-MCNC: 6.82 UIU/ML — HIGH (ref 0.27–4.2)
WBC # BLD: 9.1 K/UL — SIGNIFICANT CHANGE UP (ref 3.8–10.5)
WBC # BLD: 9.2 K/UL — SIGNIFICANT CHANGE UP (ref 3.8–10.5)
WBC # FLD AUTO: 9.1 K/UL — SIGNIFICANT CHANGE UP (ref 3.8–10.5)
WBC # FLD AUTO: 9.2 K/UL — SIGNIFICANT CHANGE UP (ref 3.8–10.5)

## 2019-07-13 PROCEDURE — 93306 TTE W/DOPPLER COMPLETE: CPT | Mod: 26

## 2019-07-13 PROCEDURE — 93010 ELECTROCARDIOGRAM REPORT: CPT

## 2019-07-13 PROCEDURE — 99233 SBSQ HOSP IP/OBS HIGH 50: CPT | Mod: GC

## 2019-07-13 RX ORDER — FUROSEMIDE 40 MG
80 TABLET ORAL ONCE
Refills: 0 | Status: COMPLETED | OUTPATIENT
Start: 2019-07-13 | End: 2019-07-13

## 2019-07-13 RX ORDER — SIMETHICONE 80 MG/1
80 TABLET, CHEWABLE ORAL ONCE
Refills: 0 | Status: COMPLETED | OUTPATIENT
Start: 2019-07-13 | End: 2019-07-13

## 2019-07-13 RX ORDER — CHLORHEXIDINE GLUCONATE 213 G/1000ML
1 SOLUTION TOPICAL AT BEDTIME
Refills: 0 | Status: DISCONTINUED | OUTPATIENT
Start: 2019-07-13 | End: 2019-07-19

## 2019-07-13 RX ORDER — POTASSIUM CHLORIDE 20 MEQ
40 PACKET (EA) ORAL EVERY 4 HOURS
Refills: 0 | Status: COMPLETED | OUTPATIENT
Start: 2019-07-13 | End: 2019-07-13

## 2019-07-13 RX ORDER — SIMETHICONE 80 MG/1
80 TABLET, CHEWABLE ORAL EVERY 6 HOURS
Refills: 0 | Status: DISCONTINUED | OUTPATIENT
Start: 2019-07-13 | End: 2019-07-19

## 2019-07-13 RX ORDER — LOSARTAN POTASSIUM 100 MG/1
25 TABLET, FILM COATED ORAL DAILY
Refills: 0 | Status: DISCONTINUED | OUTPATIENT
Start: 2019-07-13 | End: 2019-07-16

## 2019-07-13 RX ORDER — POTASSIUM CHLORIDE 20 MEQ
20 PACKET (EA) ORAL ONCE
Refills: 0 | Status: DISCONTINUED | OUTPATIENT
Start: 2019-07-13 | End: 2019-07-13

## 2019-07-13 RX ORDER — FUROSEMIDE 40 MG
80 TABLET ORAL DAILY
Refills: 0 | Status: DISCONTINUED | OUTPATIENT
Start: 2019-07-13 | End: 2019-07-13

## 2019-07-13 RX ORDER — HYDRALAZINE HCL 50 MG
10 TABLET ORAL THREE TIMES A DAY
Refills: 0 | Status: DISCONTINUED | OUTPATIENT
Start: 2019-07-13 | End: 2019-07-13

## 2019-07-13 RX ORDER — FUROSEMIDE 40 MG
80 TABLET ORAL DAILY
Refills: 0 | Status: DISCONTINUED | OUTPATIENT
Start: 2019-07-13 | End: 2019-07-15

## 2019-07-13 RX ORDER — LANOLIN ALCOHOL/MO/W.PET/CERES
3 CREAM (GRAM) TOPICAL AT BEDTIME
Refills: 0 | Status: DISCONTINUED | OUTPATIENT
Start: 2019-07-13 | End: 2019-07-19

## 2019-07-13 RX ORDER — POTASSIUM PHOSPHATE, MONOBASIC POTASSIUM PHOSPHATE, DIBASIC 236; 224 MG/ML; MG/ML
15 INJECTION, SOLUTION INTRAVENOUS ONCE
Refills: 0 | Status: COMPLETED | OUTPATIENT
Start: 2019-07-13 | End: 2019-07-13

## 2019-07-13 RX ADMIN — HEPARIN SODIUM 1700 UNIT(S)/HR: 5000 INJECTION INTRAVENOUS; SUBCUTANEOUS at 04:00

## 2019-07-13 RX ADMIN — Medication 10 MILLIGRAM(S): at 21:14

## 2019-07-13 RX ADMIN — HEPARIN SODIUM 1700 UNIT(S)/HR: 5000 INJECTION INTRAVENOUS; SUBCUTANEOUS at 22:56

## 2019-07-13 RX ADMIN — HEPARIN SODIUM 1900 UNIT(S)/HR: 5000 INJECTION INTRAVENOUS; SUBCUTANEOUS at 11:33

## 2019-07-13 RX ADMIN — SIMETHICONE 80 MILLIGRAM(S): 80 TABLET, CHEWABLE ORAL at 16:39

## 2019-07-13 RX ADMIN — POTASSIUM PHOSPHATE, MONOBASIC POTASSIUM PHOSPHATE, DIBASIC 62.5 MILLIMOLE(S): 236; 224 INJECTION, SOLUTION INTRAVENOUS at 23:31

## 2019-07-13 RX ADMIN — HEPARIN SODIUM 3500 UNIT(S): 5000 INJECTION INTRAVENOUS; SUBCUTANEOUS at 11:41

## 2019-07-13 RX ADMIN — CHLORHEXIDINE GLUCONATE 1 APPLICATION(S): 213 SOLUTION TOPICAL at 21:15

## 2019-07-13 RX ADMIN — Medication 40 MILLIEQUIVALENT(S): at 09:56

## 2019-07-13 RX ADMIN — CHLORHEXIDINE GLUCONATE 1 APPLICATION(S): 213 SOLUTION TOPICAL at 05:59

## 2019-07-13 RX ADMIN — Medication 325 MILLIGRAM(S): at 11:41

## 2019-07-13 RX ADMIN — Medication 40 MILLIEQUIVALENT(S): at 05:58

## 2019-07-13 RX ADMIN — Medication 10 MILLIGRAM(S): at 14:02

## 2019-07-13 RX ADMIN — Medication 3 MILLIGRAM(S): at 23:32

## 2019-07-13 RX ADMIN — Medication 80 MILLIGRAM(S): at 14:02

## 2019-07-13 RX ADMIN — Medication 80 MILLIGRAM(S): at 05:58

## 2019-07-13 RX ADMIN — Medication 80 MILLIGRAM(S): at 23:32

## 2019-07-13 RX ADMIN — HEPARIN SODIUM 1700 UNIT(S)/HR: 5000 INJECTION INTRAVENOUS; SUBCUTANEOUS at 17:07

## 2019-07-13 RX ADMIN — SIMETHICONE 80 MILLIGRAM(S): 80 TABLET, CHEWABLE ORAL at 19:52

## 2019-07-13 NOTE — PROGRESS NOTE ADULT - SUBJECTIVE AND OBJECTIVE BOX
Miguel Zaidi MD  Internal Medicine Resident  [phone]    PATIENT:  FRANCE ROBB  [id number]    CHIEF COMPLAINT:  Patient is a 78y old  Female who presents with a chief complaint of     INTERVAL HISTORY/OVERNIGHT EVENTS:      REVIEW OF SYSTEMS:    Constitutional:     [ ] negative [ ] fevers [ ] chills [ ] weight loss [ ] weight gain  HEENT:                  [ ] negative [ ] dry eyes [ ] eye irritation [ ] postnasal drip [ ] nasal congestion  CV:                         [ ] negative  [ ] chest pain [ ] orthopnea [ ] palpitations [ ] murmur  Resp:                     [ ] negative [ ] cough [ ] shortness of breath [ ] dyspnea [ ] wheezing [ ] sputum [ ] hemoptysis  GI:                          [ ] negative [ ] nausea [ ] vomiting [ ] diarrhea [ ] constipation [ ] abd pain [ ] dysphagia   :                        [ ] negative [ ] dysuria [ ] nocturia [ ] hematuria [ ] increased urinary frequency  Musculoskeletal: [ ] negative [ ] back pain [ ] myalgias [ ] arthralgias [ ] fracture  Skin:                       [ ] negative [ ] rash [ ] itch  Neurological:        [ ] negative [ ] headache [ ] dizziness [ ] syncope [ ] weakness [ ] numbness  Psychiatric:           [ ] negative [ ] anxiety [ ] depression  Endocrine:            [ ] negative [ ] diabetes [ ] thyroid problem  Heme/Lymph:      [ ] negative [ ] anemia [ ] bleeding problem  Allergic/Immune: [ ] negative [ ] itchy eyes [ ] nasal discharge [ ] hives [ ] angioedema    [ ] All other systems negative  [ ] Unable to assess ROS because ________.    MEDICATIONS:  MEDICATIONS  (STANDING):  chlorhexidine 4% Liquid 1 Application(s) Topical <User Schedule>  ferrous    sulfate 325 milliGRAM(s) Oral daily  furosemide   Injectable 80 milliGRAM(s) IV Push daily  heparin  Infusion.  Unit(s)/Hr (17 mL/Hr) IV Continuous <Continuous>  potassium chloride    Tablet ER 40 milliEquivalent(s) Oral every 4 hours    MEDICATIONS  (PRN):  heparin  Injectable 7500 Unit(s) IV Push every 6 hours PRN For aPTT less than 40  heparin  Injectable 3500 Unit(s) IV Push every 6 hours PRN For aPTT between 40 - 57      ALLERGIES:  Allergies    No Known Allergies    Intolerances        OBJECTIVE:  ICU Vital Signs Last 24 Hrs  T(C): 36.7 (2019 04:00), Max: 36.9 (2019 17:00)  T(F): 98 (2019 04:00), Max: 98.4 (2019 17:00)  HR: 100 (2019 08:00) (64 - 106)  BP: 140/62 (2019 08:00) (101/61 - 153/62)  BP(mean): 78 (2019 08:00) (58 - 107)  ABP: --  ABP(mean): --  RR: 19 (2019 08:00) (15 - 28)  SpO2: 100% (2019 08:00) (95% - 100%)      Adult Advanced Hemodynamics Last 24 Hrs  CVP(mm Hg): --  CVP(cm H2O): --  CO: --  CI: --  PA: --  PA(mean): --  PCWP: --  SVR: --  SVRI: --  PVR: --  PVRI: --  CAPILLARY BLOOD GLUCOSE      POCT Blood Glucose.: 128 mg/dL (2019 12:46)  POCT Blood Glucose.: 151 mg/dL (2019 08:52)    CAPILLARY BLOOD GLUCOSE      POCT Blood Glucose.: 128 mg/dL (2019 12:46)    I&O's Summary    2019 07:  -  2019 07:00  --------------------------------------------------------  IN: 410 mL / OUT: 3320 mL / NET: -2910 mL    2019 07:01  -  2019 08:51  --------------------------------------------------------  IN: 34 mL / OUT: 425 mL / NET: -391 mL      Daily Height in cm: 154.94 (2019 18:41)    Daily Weight in k.7 (2019 04:00)    PHYSICAL EXAMINATION:  General: WN/WD NAD  HEENT: PERRLA, EOMI, moist mucous membranes  Neurology: A&Ox3, nonfocal, BARNEY x 4  Respiratory: CTA B/L, normal respiratory effort, no wheezes, crackles, rales  CV: RRR, S1S2, no murmurs, rubs or gallops  Abdominal: Soft, NT, ND +BS, Last BM  Extremities: No edema, + peripheral pulses  Incisions:   Tubes:    LABS:                          9.0    9.2   )-----------( 180      ( 2019 03:20 )             26.4         138  |  102  |  30<H>  ----------------------------<  114<H>  3.4<L>   |  23  |  1.15    Ca    7.8<L>      2019 03:20  Phos  2.9       Mg     2.3         TPro  5.5<L>  /  Alb  3.1<L>  /  TBili  0.6  /  DBili  x   /  AST  1724<H>  /  ALT  2161<H>  /  AlkPhos  61  13    LIVER FUNCTIONS - ( 2019 03:20 )  Alb: 3.1 g/dL / Pro: 5.5 g/dL / ALK PHOS: 61 U/L / ALT: 2161 U/L / AST: 1724 U/L / GGT: x           PT/INR - ( 2019 03:20 )   PT: 31.5 sec;   INR: 2.66 ratio         PTT - ( 2019 03:20 )  PTT:61.5 sec  CKMB Units: 3.6 ng/mL ( @ 19:58)  Creatine Kinase, Serum: 97 U/L ( @ 19:58)    CARDIAC MARKERS ( 2019 19:58 )  x     / x     / 97 U/L / x     / 3.6 ng/mL  CARDIAC MARKERS ( 2019 22:27 )  0.426 ng/mL / x     / x     / x     / x      CARDIAC MARKERS ( 2019 10:42 )  0.265 ng/mL / x     / 50 U/L / x     / x              TELEMETRY:     EKG:     IMAGING: Miguel Zaidi MD  Internal Medicine Resident  [phone]    PATIENT:  FRANCE ROBB  [id number]    INTERVAL HISTORY/OVERNIGHT EVENTS:  78 year old woman with Afib on Xarelto, HTN presented to OSH for SOB now transferred to [facility] for newly diagnosed acute decompensated systolic heart failure.      At the OSH, patient was found to be in afib with RVR and was given IV cardizem 10mg.  Patient was also loaded with digoxin.  Patient's lactate was 9.8 and received 2L IVF bolus.  Patient had TTE done which showed EF 20-25%, mod MR, mod TR, mod diffuse hypokinesis of LV.  She had an echo done in  which showed EF 55-60%. Patient was given a few doses of IV lasix 40mg (last dose  at 2PM) but was hypotensive at  at 2 AM to 75/54 and was given 1L bolus then also and SBP in the 90s.  Patient did not get diuresis afterwards.  Patient was never placed on pressor support.  Patient was also started on amio load. Patient presented with leukocytosis of 16-17 but was afebrile.  There was concern for HAP and patient was started on vanc/cefepime.  Blood cx from  were negative. Patient had a CT angio which was negative for PE but b/l pleural effusions and CT abd/pelvis which showed gallbladder wall thickening and colonic diverticulossi without diverticulitis.  Abd US was done which showed cholithiasis and severe diffuse gallbladder wall thickening.  Surgery was consulted and recommended HIDA scan but no surgical intervention.  Over the course of the hospital, patient developed an NORM (1.41 from 0.85 on admission) and shock liver (AST 4473, ALT 2724, from normal levels.) Lactate trended down to 3.1. Patient transferred to [facility] for possible candidate for invasive cardiac transport.     At [facility], patient in fluid overload state and currently on 2L NC.  History is significant for progressive SOB since .  Patient would take a few steps and become SOB.  Patient does see a cardiologist Dr. Cerda as outpatient and started her on furosemide 40mg three times a week. Per family, patient's other outpatient medications included valsartan, HCTZ, cardizem but patient does not have a list of her medications. Patient did not receive an echo since 2017 and has never had a LHC/RHC.  Currently, patient endorses nausea and acid reflux and bloating.  Patient also complains of cough.  On admission, had mild SOB but denies any chest pain, lightheadedness, dizziness.     No issues overnight    REVIEW OF SYSTEMS:  Constitutional:     [ ] negative [ ] fevers [ ] chills [ ] weight loss [ ] weight gain  HEENT:                  [ ] negative [ ] dry eyes [ ] eye irritation [ ] postnasal drip [ ] nasal congestion  CV:                         [ ] negative  [ ] chest pain [ ] orthopnea [ ] palpitations [ ] murmur  Resp:                     [ ] negative [ ] cough [ ] shortness of breath [ ] dyspnea [ ] wheezing [ ] sputum [ ] hemoptysis  GI:                          [ ] negative [ ] nausea [ ] vomiting [ ] diarrhea [ ] constipation [ ] abd pain [ ] dysphagia   :                        [ ] negative [ ] dysuria [ ] nocturia [ ] hematuria [ ] increased urinary frequency  Musculoskeletal: [ ] negative [ ] back pain [ ] myalgias [ ] arthralgias [ ] fracture  Skin:                       [ ] negative [ ] rash [ ] itch  Neurological:        [ ] negative [ ] headache [ ] dizziness [ ] syncope [ ] weakness [ ] numbness  Psychiatric:           [ ] negative [ ] anxiety [ ] depression  Endocrine:            [ ] negative [ ] diabetes [ ] thyroid problem  Heme/Lymph:      [ ] negative [ ] anemia [ ] bleeding problem  Allergic/Immune: [ ] negative [ ] itchy eyes [ ] nasal discharge [ ] hives [ ] angioedema    [ ] All other systems negative  [ ] Unable to assess ROS because ________.    MEDICATIONS:  MEDICATIONS  (STANDING):  chlorhexidine 4% Liquid 1 Application(s) Topical <User Schedule>  ferrous    sulfate 325 milliGRAM(s) Oral daily  furosemide   Injectable 80 milliGRAM(s) IV Push daily  heparin  Infusion.  Unit(s)/Hr (17 mL/Hr) IV Continuous <Continuous>  potassium chloride    Tablet ER 40 milliEquivalent(s) Oral every 4 hours    MEDICATIONS  (PRN):  heparin  Injectable 7500 Unit(s) IV Push every 6 hours PRN For aPTT less than 40  heparin  Injectable 3500 Unit(s) IV Push every 6 hours PRN For aPTT between 40 - 57      ALLERGIES:  Allergies    No Known Allergies    Intolerances        OBJECTIVE:  ICU Vital Signs Last 24 Hrs  T(C): 36.7 (2019 04:00), Max: 36.9 (2019 17:00)  T(F): 98 (2019 04:00), Max: 98.4 (2019 17:00)  HR: 100 (2019 08:00) (64 - 106)  BP: 140/62 (2019 08:00) (101/61 - 153/62)  BP(mean): 78 (2019 08:00) (58 - 107)  ABP: --  ABP(mean): --  RR: 19 (2019 08:00) (15 - 28)  SpO2: 100% (2019 08:00) (95% - 100%)      Adult Advanced Hemodynamics Last 24 Hrs  CVP(mm Hg): --  CVP(cm H2O): --  CO: --  CI: --  PA: --  PA(mean): --  PCWP: --  SVR: --  SVRI: --  PVR: --  PVRI: --  CAPILLARY BLOOD GLUCOSE      POCT Blood Glucose.: 128 mg/dL (2019 12:46)  POCT Blood Glucose.: 151 mg/dL (2019 08:52)    CAPILLARY BLOOD GLUCOSE      POCT Blood Glucose.: 128 mg/dL (2019 12:46)    I&O's Summary    2019 07:01  -  2019 07:00  --------------------------------------------------------  IN: 410 mL / OUT: 3320 mL / NET: -2910 mL    2019 07:01  -  2019 08:51  --------------------------------------------------------  IN: 34 mL / OUT: 425 mL / NET: -391 mL      Daily Height in cm: 154.94 (2019 18:41)    Daily Weight in k.7 (2019 04:00)    PHYSICAL EXAMINATION:  General: WN/WD NAD  HEENT: SANDI SANDOVAL, moist mucous membranes  Neurology: A&Ox3, nonfocal, BARNEY x 4  Respiratory: CTA B/L, normal respiratory effort, no wheezes, crackles, rales  CV: RRR, S1S2, no murmurs, rubs or gallops  Abdominal: Soft, NT, ND +BS, Last BM  Extremities: No edema, + peripheral pulses  Incisions:   Tubes:    LABS:                          9.0    9.2   )-----------( 180      ( 2019 03:20 )             26.4         138  |  102  |  30<H>  ----------------------------<  114<H>  3.4<L>   |  23  |  1.15    Ca    7.8<L>      2019 03:20  Phos  2.9       Mg     2.3         TPro  5.5<L>  /  Alb  3.1<L>  /  TBili  0.6  /  DBili  x   /  AST  1724<H>  /  ALT  2161<H>  /  AlkPhos  61  13    LIVER FUNCTIONS - ( 2019 03:20 )  Alb: 3.1 g/dL / Pro: 5.5 g/dL / ALK PHOS: 61 U/L / ALT: 2161 U/L / AST: 1724 U/L / GGT: x           PT/INR - ( 2019 03:20 )   PT: 31.5 sec;   INR: 2.66 ratio         PTT - ( 2019 03:20 )  PTT:61.5 sec  CKMB Units: 3.6 ng/mL ( @ 19:58)  Creatine Kinase, Serum: 97 U/L ( @ 19:58)    CARDIAC MARKERS ( 2019 19:58 )  x     / x     / 97 U/L / x     / 3.6 ng/mL  CARDIAC MARKERS ( 2019 22:27 )  0.426 ng/mL / x     / x     / x     / x      CARDIAC MARKERS ( 2019 10:42 )  0.265 ng/mL / x     / 50 U/L / x     / x              TELEMETRY:     EKG:     IMAGING: Miguel Zaidi MD  Internal Medicine Resident  [phone]    PATIENT:  FRANCE ROBB  [id number]    INTERVAL HISTORY/OVERNIGHT EVENTS:  78 year old woman with Afib on Xarelto, HTN presented to OSH for SOB now transferred to [facility] for newly diagnosed acute decompensated systolic heart failure.      At the OSH, patient was found to be in afib with RVR and was given IV cardizem 10mg.  Patient was also loaded with digoxin.  Patient's lactate was 9.8 and received 2L IVF bolus.  Patient had TTE done which showed EF 20-25%, mod MR, mod TR, mod diffuse hypokinesis of LV.  She had an echo done in  which showed EF 55-60%. Patient was given a few doses of IV lasix 40mg (last dose  at 2PM) but was hypotensive at  at 2 AM to 75/54 and was given 1L bolus then also and SBP in the 90s.  Patient did not get diuresis afterwards.  Patient was never placed on pressor support.  Patient was also started on amio load. Patient presented with leukocytosis of 16-17 but was afebrile.  There was concern for HAP and patient was started on vanc/cefepime.  Blood cx from  were negative. Patient had a CT angio which was negative for PE but b/l pleural effusions and CT abd/pelvis which showed gallbladder wall thickening and colonic diverticulossi without diverticulitis.  Abd US was done which showed cholithiasis and severe diffuse gallbladder wall thickening.  Surgery was consulted and recommended HIDA scan but no surgical intervention.  Over the course of the hospital, patient developed an NORM (1.41 from 0.85 on admission) and shock liver (AST 4473, ALT 2724, from normal levels.) Lactate trended down to 3.1. Patient transferred to [facility] for possible candidate for invasive cardiac transport.     At [facility], patient in fluid overload state and currently on 2L NC.  History is significant for progressive SOB since .  Patient would take a few steps and become SOB.  Patient does see a cardiologist Dr. Cerda as outpatient and started her on furosemide 40mg three times a week. Per family, patient's other outpatient medications included valsartan, HCTZ, cardizem but patient does not have a list of her medications. Patient did not receive an echo since 2017 and has never had a LHC/RHC.  Currently, patient endorses nausea and acid reflux and bloating.  Patient also complains of cough.  On admission, had mild SOB but denies any chest pain, lightheadedness, dizziness.     No issues overnight    REVIEW OF SYSTEMS:  Constitutional:     [ x] negative [ ] fevers [ ] chills [ ] weight loss [ ] weight gain  HEENT:                  [ x negative [ ] dry eyes [ ] eye irritation [ ] postnasal drip [ ] nasal congestion  CV:                         [x] negative  [ ] chest pain [ ] orthopnea [ ] palpitations [ ] murmur  Resp:                     [ x] negative [ ] cough [ ] shortness of breath [ ] dyspnea [ ] wheezing [ ] sputum [ ] hemoptysis  GI:                          [x ] negative [ ] nausea [ ] vomiting [ ] diarrhea [ ] constipation [ ] abd pain [ ] dysphagia   :                        [x ] negative [ ] dysuria [ ] nocturia [ ] hematuria [ ] increased urinary frequency  Musculoskeletal: [ x] negative [ ] back pain [ ] myalgias [ ] arthralgias [ ] fracture  Skin:                       [ x] negative [ ] rash [ ] itch  Neurological:        [ x] negative [ ] headache [ ] dizziness [ ] syncope [ ] weakness [ ] numbness  Psychiatric:           [ x] negative [ ] anxiety [ ] depression  Endocrine:            [ x] negative [ ] diabetes [ ] thyroid problem  Heme/Lymph:      [x ] negative [ ] anemia [ ] bleeding problem  Allergic/Immune: [ x] negative [ ] itchy eyes [ ] nasal discharge [ ] hives [ ] angioedema    MEDICATIONS:  MEDICATIONS  (STANDING):  chlorhexidine 4% Liquid 1 Application(s) Topical <User Schedule>  ferrous    sulfate 325 milliGRAM(s) Oral daily  furosemide   Injectable 80 milliGRAM(s) IV Push daily  heparin  Infusion.  Unit(s)/Hr (17 mL/Hr) IV Continuous <Continuous>  potassium chloride    Tablet ER 40 milliEquivalent(s) Oral every 4 hours    MEDICATIONS  (PRN):  heparin  Injectable 7500 Unit(s) IV Push every 6 hours PRN For aPTT less than 40  heparin  Injectable 3500 Unit(s) IV Push every 6 hours PRN For aPTT between 40 - 57    ALLERGIES:  Allergies  No Known Allergies    OBJECTIVE:  ICU Vital Signs Last 24 Hrs  T(C): 36.7 (2019 04:00), Max: 36.9 (2019 17:00)  T(F): 98 (2019 04:00), Max: 98.4 (2019 17:00)  HR: 100 (2019 08:00) (64 - 106)  BP: 140/62 (2019 08:00) (101/61 - 153/62)  BP(mean): 78 (2019 08:00) (58 - 107)  RR: 19 (2019 08:00) (15 - 28)  SpO2: 100% (2019 08:00) (95% - 100%)      POCT Blood Glucose.: 128 mg/dL (2019 12:46)  POCT Blood Glucose.: 151 mg/dL (2019 08:52)    CAPILLARY BLOOD GLUCOSE  POCT Blood Glucose.: 128 mg/dL (2019 12:46)    I&O's Summary    2019 07:01  -  2019 07:00  --------------------------------------------------------  IN: 410 mL / OUT: 3320 mL / NET: -2910 mL    2019 07:01  -  2019 08:51  --------------------------------------------------------  IN: 34 mL / OUT: 425 mL / NET: -391 mL      Daily Height in cm: 154.94 (2019 18:41)    Daily Weight in k.7 (2019 04:00)    PHYSICAL EXAMINATION:  General: WN/WD NAD  HEENT: PERRLA, EOMI, moist mucous membranes  Neurology: A&Ox3, nonfocal, BARNEY x 4  Respiratory: CTA B/L, normal respiratory effort, no wheezes, crackles, rales  CV: RRR, S1S2, no murmurs, rubs or gallops, +jvd  Abdominal: Soft, NT, ND +BS, Last BM  Extremities: +edema  I    LABS:                          9.0    9.2   )-----------( 180      ( 2019 03:20 )             26.4     07-    138  |  102  |  30<H>  ----------------------------<  114<H>  3.4<L>   |  23  |  1.15    Ca    7.8<L>      2019 03:20  Phos  2.9       Mg     2.3         TPro  5.5<L>  /  Alb  3.1<L>  /  TBili  0.6  /  DBili  x   /  AST  1724<H>  /  ALT  2161<H>  /  AlkPhos  61  -13    LIVER FUNCTIONS - ( 2019 03:20 )  Alb: 3.1 g/dL / Pro: 5.5 g/dL / ALK PHOS: 61 U/L / ALT: 2161 U/L / AST: 1724 U/L / GGT: x           PT/INR - ( 2019 03:20 )   PT: 31.5 sec;   INR: 2.66 ratio         PTT - ( 2019 03:20 )  PTT:61.5 sec  CKMB Units: 3.6 ng/mL ( @ 19:58)  Creatine Kinase, Serum: 97 U/L ( @ 19:58)    CARDIAC MARKERS ( 2019 19:58 )  x     / x     / 97 U/L / x     / 3.6 ng/mL  CARDIAC MARKERS ( 2019 22:27 )  0.426 ng/mL / x     / x     / x     / x      CARDIAC MARKERS ( 2019 10:42 )  0.265 ng/mL / x     / 50 U/L / x     / x              TELEMETRY:     EKG:     IMAGING:

## 2019-07-13 NOTE — PROGRESS NOTE ADULT - ASSESSMENT
Assessment:      Plan  #Neuro      #Cardiovascular  Pump    Coronaries    EP    #Pulmonary      #Renal      #Infectious disease      #Hematology/Oncology      #Gastrointestinal      #Endocrine      #Psychiatric      #FEN      #PPx      Miguel Zaidi MD  Internal Medicine Resident  [phone] 78 year old woman with Afib on Xarelto, HTN presented to OSH for SOB now transferred to [facility] for newly diagnosed acute decompensated systolic heart failure currently undergoing diuresis.    Neuro:  -no active issues    Cardiovascular:    1. Acute decompensated systolic heart failure  -TTE from OSH showed EF: 20-25%, mod MR, mod TR, mod diffuse hypokinesis of LV.  She had an echo done in 2017 which showed EF 55-60%. Unclear etiology ischemic vs non-ischemic  -BNP 63310  -IV lasix 80mg now  -monitor UOP, strict I/O; has escamilla in place  -f/u UOP and dose Lasix appropriately  -holding BB in setting of ADHF  -holding ACEi/ARB in setting of NORM  -may need cardiac cath to evaluate for ischemic causes of ADHF     2. Afib   -CHADVASC: 5  -holding rivoraxaban, c/w heparin ggt  -holding BB in setting of ADHF  -on tele, may be afib with aberrancy; f/u digoxin level     3. HTN  -holding anti-hypertensives in setting of low BP    Pulmonary  -currently on 2L NC, not requiring supplementation d/c NC prn  -continue with diuresis as above  -continue to monitor    GI  1. Congestive hepatopathy  -LFTs peaked at AST/ALT  4473/2724  -currently trending down  -continue to monitor       2. Nausea  -c/o of nausea, QTC is 531 most likely in the setting of afib with aberrancy   -monitor QTC and zofran as needed    3. Gallbladder thickening  -at OSH, CT abd/pelvis which showed gallbladder wall thickening and colonic diverticulossi without diverticulitis.  Abd US was done which showed cholithiasis and severe diffuse gallbladder wall thickening.  Surgery was consulted and recommended HIDA scan but no surgical intervention. Patient not actively having symptoms; will hold off on HIDA scan.    Renal:  1. NORM  -most likely in the 2/2 of low flow state  -continue to monitor Cr  -currently improving    ID:  -at OSH, was on vanc/cefepime for ?HAP  -blood cx 7/11: NGTD  -will continue to monitor off abx    Heme:  -chronic anemia from iron-deficiency anemia (past Iron studies done in 07/19)  -continue to monitor CBC  -c/w ferrous sulfate       Miguel Zaidi MD  Internal Medicine Resident  [phone] 78 year old woman with Afib on Xarelto, HTN presented to OSH for SOB now transferred to [facility] for newly diagnosed acute decompensated systolic heart failure currently undergoing diuresis.    Neuro:  -no active issues    Cardiovascular:    1. Acute decompensated systolic heart failure  -TTE from OSH showed EF: 20-25%, mod MR, mod TR, mod diffuse hypokinesis of LV.  She had an echo done in 2017 which showed EF 55-60%. Unclear etiology ischemic vs non-ischemic  -BNP 98719  -IV lasix 80mg now  -monitor UOP, strict I/O; has escamilla in place  -f/u UOP and dose Lasix appropriately  -holding BB in setting of ADHF  -holding ACEi/ARB in setting of NORM  -will need cardiac cath to evaluate for ischemic causes of ADHF   starting hydral 10 tid for afterload reduction  goal uop is >150 ml/hr, dose bumex when this falls below this threshold    2. Afib   -CHADVASC: 5  -holding rivoraxaban, c/w heparin ggt  -holding BB in setting of ADHF  -on tele, may be afib with aberrancy; f/u digoxin level     3. HTN  -holding anti-hypertensives in setting of low BP    Pulmonary  -currently on 2L NC, not requiring supplementation d/c NC prn  -continue with diuresis as above  -continue to monitor    GI  1. Congestive hepatopathy  -LFTs peaked at AST/ALT  4473/2724  -currently trending down  -continue to monitor       2. Nausea  -c/o of nausea, QTC is 531 most likely in the setting of afib with aberrancy   -monitor QTC and zofran as needed    3. Gallbladder thickening  -at OSH, CT abd/pelvis which showed gallbladder wall thickening and colonic diverticulossi without diverticulitis.  Abd US was done which showed cholithiasis and severe diffuse gallbladder wall thickening.  Surgery was consulted and recommended HIDA scan but no surgical intervention. Patient not actively having symptoms; will hold off on HIDA scan.    Renal:  1. NORM  -most likely in the 2/2 of low flow state  -continue to monitor Cr  -currently improving    ID:  -at OSH, was on vanc/cefepime for ?HAP  -blood cx 7/11: NGTD  -will continue to monitor off abx    Heme:  -chronic anemia from iron-deficiency anemia (past Iron studies done in 07/19)  -continue to monitor CBC  -c/w ferrous sulfate     Miguel Zaidi MD  Internal Medicine Resident  [phone]

## 2019-07-13 NOTE — CHART NOTE - NSCHARTNOTEFT_GEN_A_CORE
====================  CCU MIDNIGHT ROUNDS  ====================    FRANCE ROBB  [id number]    ====================  SUMMARY: HPI:  78 year old woman with Afib on Xarelto, HTN presented to OSH for SOB now transferred to [facility] for newly diagnosed acute decompensated systolic heart failure.      At the OSH, patient was found to be in afib with RVR and was given IV cardizem 10mg.  Patient was also loaded with digoxin.  Patient's lactate was 9.8 and received 2L IVF bolus.  Patient had TTE done which showed EF 20-25%, mod MR, mod TR, mod diffuse hypokinesis of LV.  She had an echo done in 2017 which showed EF 55-60%. Patient was given a few doses of IV lasix 40mg (last dose 7/11 at 2PM) but was hypotensive at 7/12 at 2 AM to 75/54 and was given 1L bolus then also and SBP in the 90s.  Patient did not get diuresis afterwards.  Patient was never placed on pressor support.  Patient was also started on amio load. Patient presented with leukocytosis of 16-17 but was afebrile.  There was concern for HAP and patient was started on vanc/cefepime.  Blood cx from 07/11 were negative. Patient had a CT angio which was negative for PE but b/l pleural effusions and CT abd/pelvis which showed gallbladder wall thickening and colonic diverticulossi without diverticulitis.  Abd US was done which showed cholithiasis and severe diffuse gallbladder wall thickening.  Surgery was consulted and recommended HIDA scan but no surgical intervention.  Over the course of the hospital, patient developed an NORM (1.41 from 0.85 on admission) and shock liver (AST 4473, ALT 2724, from normal levels.) Lactate trended down to 3.1. Patient transferred to [facility] for possible candidate for invasive cardiac transport.     At [facility], patient in fluid overload state and currently on 2L NC.  History is significant for progressive SOB since 01/19.  Patient would take a few steps and become SOB.  Patient does see a cardiologist Dr. Cerda as outpatient and started her on furosemide 40mg three times a week. Per family, patient's other outpatient medications included valsartan, HCTZ, cardizem but patient does not have a list of her medications. Patient did not receive an echo since 2017 and has never had a LHC/RHC.  Currently, patient endorses nausea and acid reflux and bloating.  Patient also complains of cough.  Has mild SOB but denies any chest pain, lightheadedness, dizziness. (12 Jul 2019 18:58)    ====================        ====================  NEW EVENTS:  U/O dropped below 100cc/hour x2. Will dose another lasix 80mg IVP now.   No complaints.  ====================        ====================  VITALS (Last 12 hrs):  ====================    T(C): 37.5 (07-13-19 @ 20:00), Max: 37.5 (07-13-19 @ 20:00)  HR: 116 (07-13-19 @ 21:00) (102 - 130)  BP: 101/60 (07-13-19 @ 21:00) (96/58 - 126/81)  BP(mean): 77 (07-13-19 @ 21:00) (73 - 92)  RR: 26 (07-13-19 @ 21:00) (17 - 26)  SpO2: 94% (07-13-19 @ 21:00) (94% - 98%)      TELEMETRY: toya        I&O's Summary    12 Jul 2019 07:01  -  13 Jul 2019 07:00  --------------------------------------------------------  IN: 410 mL / OUT: 3320 mL / NET: -2910 mL    13 Jul 2019 07:01  -  13 Jul 2019 23:01  --------------------------------------------------------  IN: 747 mL / OUT: 2965 mL / NET: -2218 mL        ====================  PLAN:  # New Acute HFrEF  - TTE showing EF: 20-25%, mod MR, mod TR, mod diffuse hypokinesis of LV.  She had an echo done in 2017 which showed EF 55-60%  - will need LHC and RHC to evaluate ischemic vs NICM  - c/w diuresis to keep U/O>100 per hour  - strict I/Os, lytes, daily weights  - hold BB in decompensated state  - c/w hydral 10 tid for afterload reduction  - congestive hepatopathy and NORM improving    # Afib   - CHADVASC: 5  - holding rivoraxaban, c/w heparin gtt as pt will need cath this admission  - holding BB in setting of ADHF    ====================    HEALTH ISSUES - PROBLEM Dx:        Meme Luque, CCU PA #[id number] ====================  CCU MIDNIGHT ROUNDS  ====================    FRANCE ROBB  [id number]    ====================  SUMMARY: HPI:  78 year old woman with Afib on Xarelto, HTN presented to OSH for SOB now transferred to [facility] for newly diagnosed acute decompensated systolic heart failure.      At the OSH, patient was found to be in afib with RVR and was given IV cardizem 10mg.  Patient was also loaded with digoxin.  Patient's lactate was 9.8 and received 2L IVF bolus.  Patient had TTE done which showed EF 20-25%, mod MR, mod TR, mod diffuse hypokinesis of LV.  She had an echo done in 2017 which showed EF 55-60%. Patient was given a few doses of IV lasix 40mg (last dose 7/11 at 2PM) but was hypotensive at 7/12 at 2 AM to 75/54 and was given 1L bolus then also and SBP in the 90s.  Patient did not get diuresis afterwards.  Patient was never placed on pressor support.  Patient was also started on amio load. Patient presented with leukocytosis of 16-17 but was afebrile.  There was concern for HAP and patient was started on vanc/cefepime.  Blood cx from 07/11 were negative. Patient had a CT angio which was negative for PE but b/l pleural effusions and CT abd/pelvis which showed gallbladder wall thickening and colonic diverticulossi without diverticulitis.  Abd US was done which showed cholithiasis and severe diffuse gallbladder wall thickening.  Surgery was consulted and recommended HIDA scan but no surgical intervention.  Over the course of the hospital, patient developed an NORM (1.41 from 0.85 on admission) and shock liver (AST 4473, ALT 2724, from normal levels.) Lactate trended down to 3.1. Patient transferred to [facility] for possible candidate for invasive cardiac transport.     At [facility], patient in fluid overload state and currently on 2L NC.  History is significant for progressive SOB since 01/19.  Patient would take a few steps and become SOB.  Patient does see a cardiologist Dr. Cerda as outpatient and started her on furosemide 40mg three times a week. Per family, patient's other outpatient medications included valsartan, HCTZ, cardizem but patient does not have a list of her medications. Patient did not receive an echo since 2017 and has never had a LHC/RHC.  Currently, patient endorses nausea and acid reflux and bloating.  Patient also complains of cough.  Has mild SOB but denies any chest pain, lightheadedness, dizziness. (12 Jul 2019 18:58)    ====================        ====================  NEW EVENTS:  U/O dropped below 100cc/hour x2. Will dose another lasix 80mg IVP now.   No complaints.  ====================        ====================  VITALS (Last 12 hrs):  ====================    T(C): 37.5 (07-13-19 @ 20:00), Max: 37.5 (07-13-19 @ 20:00)  HR: 116 (07-13-19 @ 21:00) (102 - 130)  BP: 101/60 (07-13-19 @ 21:00) (96/58 - 126/81)  BP(mean): 77 (07-13-19 @ 21:00) (73 - 92)  RR: 26 (07-13-19 @ 21:00) (17 - 26)  SpO2: 94% (07-13-19 @ 21:00) (94% - 98%)      TELEMETRY: toya        I&O's Summary    12 Jul 2019 07:01  -  13 Jul 2019 07:00  --------------------------------------------------------  IN: 410 mL / OUT: 3320 mL / NET: -2910 mL    13 Jul 2019 07:01  -  13 Jul 2019 23:01  --------------------------------------------------------  IN: 747 mL / OUT: 2965 mL / NET: -2218 mL        ====================  PLAN:  # New Acute HFrEF  - TTE showing EF: 20-25%, mod MR, mod TR, mod diffuse hypokinesis of LV.  She had an echo done in 2017 which showed EF 55-60%  - will need LHC and RHC to evaluate ischemic vs NICM  - c/w diuresis to keep U/O>100 per hour  - strict I/Os, lytes, daily weights  - hold BB in decompensated state  - will d/c hydralazine and start losartan 25 in the AM for afterload reduction (NORM resolved)  - congestive hepatopathy improving    # Afib   - CHADVASC: 5  - holding rivoraxaban, c/w heparin gtt as pt will need cath this admission  - holding BB in setting of ADHF    ====================    HEALTH ISSUES - PROBLEM Dx:        Meme Luque, CCU PA #[id number]

## 2019-07-13 NOTE — PROGRESS NOTE ADULT - SUBJECTIVE AND OBJECTIVE BOX
====================  CCU MIDNIGHT ROUNDS  ====================    FRANCE ROBB  [id number]  Patient is a 78y old  Female who presents with a chief complaint of     ====================  SUMMARY:  ====================  78 year old woman with Afib on Xarelto, HTN presented to OS for SOB now transferred to [facility] for newly diagnosed acute decompensated systolic heart failure currently undergoing diuresis.      ====================  NEW EVENTS:  ====================  Pt neg 1.1L since admission and is hemodynamically stable     ====================  VITALS (Last 12 hrs):  ====================    T(C): 36.3 (07-12-19 @ 20:00), Max: 36.9 (07-12-19 @ 17:00)  T(F): 97.3 (07-12-19 @ 20:00), Max: 98.4 (07-12-19 @ 17:00)  HR: 106 (07-12-19 @ 23:00) (72 - 106)  BP: 117/70 (07-12-19 @ 23:00) (101/61 - 141/72)  BP(mean): 92 (07-12-19 @ 23:00) (58 - 105)  ABP: --  ABP(mean): --  RR: 23 (07-12-19 @ 23:00) (15 - 28)  SpO2: 100% (07-12-19 @ 23:00) (95% - 100%)  Wt(kg): --  CVP(mm Hg): --  CVP(cm H2O): --  CO: --  CI: --  PA: --  PA(mean): --  PCWP: --  SVR: --  PVR: --    I&O's Summary    12 Jul 2019 07:01  -  13 Jul 2019 00:03  --------------------------------------------------------  IN: 171 mL / OUT: 1295 mL / NET: -1124 mL            ====================  NEW LABS:  ====================                          9.0    11.4  )-----------( 213      ( 12 Jul 2019 19:58 )             28.1     07-12    137  |  100  |  31<H>  ----------------------------<  104<H>  4.2   |  20<L>  |  1.27    Ca    7.6<L>      12 Jul 2019 19:58  Phos  3.6     07-12  Mg     2.3     07-12    TPro  5.4<L>  /  Alb  3.2<L>  /  TBili  0.6  /  DBili  x   /  AST  2914<H>  /  ALT  2378<H>  /  AlkPhos  61  07-12    PT/INR - ( 12 Jul 2019 19:58 )   PT: 45.0 sec;   INR: 3.76 ratio         PTT - ( 12 Jul 2019 19:58 )  PTT:34.4 sec  Creatine Kinase, Serum: 97 U/L (07-12-19 @ 19:58)    CKMB Units: 3.6 ng/mL (07-12 @ 19:58)    ABG - ( 11 Jul 2019 06:00 )  pH, Arterial: 7.37  pH, Blood: x     /  pCO2: 34    /  pO2: 181   / HCO3: 19    / Base Excess: -5.3  /  SaO2: 98                    ====================  PLAN:  ====================  Neuro:  -no active issues    Cardiovascular:    1. Acute decompensated systolic heart failure  -TTE from [facility] showed EF: 20-25%, mod MR, mod TR, mod diffuse hypokinesis of LV.  She had an echo done in 2017 which showed EF 55-60%. Unclear etiology ischemic vs non-ischemic  -BNP 65203  -IV lasix 80mg now  -monitor UOP, strict I/O; has escamilla in place  -f/u UOP and dose Lasix appropriately  -holding BB in setting of ADHF  -holding ACEi/ARB in setting of NORM  -may need cardiac cath to evaluate for ischemic causes of ADHF     2. Afib   -CHADVASC: 5  -holding rivoraxaban, c/w heparin ggt  -holding BB in setting of ADHF  -on tele, may be afib with aberrancy; f/u digoxin level     3. HTN  -holding anti-hypertensives in setting of low BP    Pulmonary  -currently on 2L NC  -continue with diuresis as above  -continue to monitor    GI  1. Congestive hepatopathy  -LFTs peaked at AST/ALT  4473/2724  -currently trending down  -continue to monitor       2. Nausea  -c/o of nausea, QTC is 531 most likely in the setting of afib with aberrancy   -monitor QTC and zofran as needed    3. Gallbladder thickening  -at OSH, CT abd/pelvis which showed gallbladder wall thickening and colonic diverticulossi without diverticulitis.  Abd US was done which showed cholithiasis and severe diffuse gallbladder wall thickening.  Surgery was consulted and recommended HIDA scan but no surgical intervention. Patient not actively having symptoms; will hold off on HIDA scan.    Renal:  1. NORM  -most likely in the 2/2 of low flow state  -continue to monitor Cr  -currently improving    ID:  -at OSH, was on vanc/cefepime for ?HAP  -blood cx 7/11: NGTD  -will continue to monitor off abx    Heme:  -chronic anemia from iron-deficiency anemia (past Iron studies done in 07/19)  -continue to monitor CBC  -c/w ferrous sulfate     Medication management:  -medrec couldn't be completed as pharmacy was closed: pharmacy is [facility] ([phone])  -please obtain med rec     DVT ppx: heparin ggt

## 2019-07-14 LAB
ALBUMIN SERPL ELPH-MCNC: 3 G/DL — LOW (ref 3.3–5)
ALBUMIN SERPL ELPH-MCNC: 3 G/DL — LOW (ref 3.3–5)
ALP SERPL-CCNC: 67 U/L — SIGNIFICANT CHANGE UP (ref 40–120)
ALP SERPL-CCNC: 69 U/L — SIGNIFICANT CHANGE UP (ref 40–120)
ALT FLD-CCNC: 1458 U/L — HIGH (ref 10–45)
ALT FLD-CCNC: 1525 U/L — HIGH (ref 10–45)
ANION GAP SERPL CALC-SCNC: 13 MMOL/L — SIGNIFICANT CHANGE UP (ref 5–17)
ANION GAP SERPL CALC-SCNC: 15 MMOL/L — SIGNIFICANT CHANGE UP (ref 5–17)
APTT BLD: 64.5 SEC — HIGH (ref 27.5–36.3)
AST SERPL-CCNC: 582 U/L — HIGH (ref 10–40)
AST SERPL-CCNC: 659 U/L — HIGH (ref 10–40)
BASOPHILS # BLD AUTO: 0 K/UL — SIGNIFICANT CHANGE UP (ref 0–0.2)
BASOPHILS # BLD AUTO: 0 K/UL — SIGNIFICANT CHANGE UP (ref 0–0.2)
BASOPHILS NFR BLD AUTO: 0.3 % — SIGNIFICANT CHANGE UP (ref 0–2)
BASOPHILS NFR BLD AUTO: 0.3 % — SIGNIFICANT CHANGE UP (ref 0–2)
BILIRUB SERPL-MCNC: 0.7 MG/DL — SIGNIFICANT CHANGE UP (ref 0.2–1.2)
BILIRUB SERPL-MCNC: 0.8 MG/DL — SIGNIFICANT CHANGE UP (ref 0.2–1.2)
BUN SERPL-MCNC: 22 MG/DL — SIGNIFICANT CHANGE UP (ref 7–23)
BUN SERPL-MCNC: 23 MG/DL — SIGNIFICANT CHANGE UP (ref 7–23)
CALCIUM SERPL-MCNC: 7.3 MG/DL — LOW (ref 8.4–10.5)
CALCIUM SERPL-MCNC: 7.6 MG/DL — LOW (ref 8.4–10.5)
CHLORIDE SERPL-SCNC: 92 MMOL/L — LOW (ref 96–108)
CHLORIDE SERPL-SCNC: 96 MMOL/L — SIGNIFICANT CHANGE UP (ref 96–108)
CO2 SERPL-SCNC: 27 MMOL/L — SIGNIFICANT CHANGE UP (ref 22–31)
CO2 SERPL-SCNC: 27 MMOL/L — SIGNIFICANT CHANGE UP (ref 22–31)
CREAT SERPL-MCNC: 0.69 MG/DL — SIGNIFICANT CHANGE UP (ref 0.5–1.3)
CREAT SERPL-MCNC: 0.79 MG/DL — SIGNIFICANT CHANGE UP (ref 0.5–1.3)
EOSINOPHIL # BLD AUTO: 0.2 K/UL — SIGNIFICANT CHANGE UP (ref 0–0.5)
EOSINOPHIL # BLD AUTO: 0.2 K/UL — SIGNIFICANT CHANGE UP (ref 0–0.5)
EOSINOPHIL NFR BLD AUTO: 1.9 % — SIGNIFICANT CHANGE UP (ref 0–6)
EOSINOPHIL NFR BLD AUTO: 2.3 % — SIGNIFICANT CHANGE UP (ref 0–6)
GLUCOSE SERPL-MCNC: 132 MG/DL — HIGH (ref 70–99)
GLUCOSE SERPL-MCNC: 145 MG/DL — HIGH (ref 70–99)
HCT VFR BLD CALC: 29.2 % — LOW (ref 34.5–45)
HCT VFR BLD CALC: 32.3 % — LOW (ref 34.5–45)
HGB BLD-MCNC: 10.2 G/DL — LOW (ref 11.5–15.5)
HGB BLD-MCNC: 9.5 G/DL — LOW (ref 11.5–15.5)
INR BLD: 1.49 RATIO — HIGH (ref 0.88–1.16)
LYMPHOCYTES # BLD AUTO: 0.9 K/UL — LOW (ref 1–3.3)
LYMPHOCYTES # BLD AUTO: 1 K/UL — SIGNIFICANT CHANGE UP (ref 1–3.3)
LYMPHOCYTES # BLD AUTO: 11.2 % — LOW (ref 13–44)
LYMPHOCYTES # BLD AUTO: 11.7 % — LOW (ref 13–44)
MAGNESIUM SERPL-MCNC: 1.9 MG/DL — SIGNIFICANT CHANGE UP (ref 1.6–2.6)
MAGNESIUM SERPL-MCNC: 1.9 MG/DL — SIGNIFICANT CHANGE UP (ref 1.6–2.6)
MCHC RBC-ENTMCNC: 25.7 PG — LOW (ref 27–34)
MCHC RBC-ENTMCNC: 26.1 PG — LOW (ref 27–34)
MCHC RBC-ENTMCNC: 31.6 GM/DL — LOW (ref 32–36)
MCHC RBC-ENTMCNC: 32.5 GM/DL — SIGNIFICANT CHANGE UP (ref 32–36)
MCV RBC AUTO: 80.2 FL — SIGNIFICANT CHANGE UP (ref 80–100)
MCV RBC AUTO: 81.1 FL — SIGNIFICANT CHANGE UP (ref 80–100)
MONOCYTES # BLD AUTO: 1 K/UL — HIGH (ref 0–0.9)
MONOCYTES # BLD AUTO: 1.1 K/UL — HIGH (ref 0–0.9)
MONOCYTES NFR BLD AUTO: 12.1 % — SIGNIFICANT CHANGE UP (ref 2–14)
MONOCYTES NFR BLD AUTO: 13.4 % — SIGNIFICANT CHANGE UP (ref 2–14)
NEUTROPHILS # BLD AUTO: 5.8 K/UL — SIGNIFICANT CHANGE UP (ref 1.8–7.4)
NEUTROPHILS # BLD AUTO: 6.2 K/UL — SIGNIFICANT CHANGE UP (ref 1.8–7.4)
NEUTROPHILS NFR BLD AUTO: 72.8 % — SIGNIFICANT CHANGE UP (ref 43–77)
NEUTROPHILS NFR BLD AUTO: 74 % — SIGNIFICANT CHANGE UP (ref 43–77)
PHOSPHATE SERPL-MCNC: 2.3 MG/DL — LOW (ref 2.5–4.5)
PHOSPHATE SERPL-MCNC: 3 MG/DL — SIGNIFICANT CHANGE UP (ref 2.5–4.5)
PLATELET # BLD AUTO: 204 K/UL — SIGNIFICANT CHANGE UP (ref 150–400)
PLATELET # BLD AUTO: 249 K/UL — SIGNIFICANT CHANGE UP (ref 150–400)
POTASSIUM SERPL-MCNC: 3.9 MMOL/L — SIGNIFICANT CHANGE UP (ref 3.5–5.3)
POTASSIUM SERPL-MCNC: 4.4 MMOL/L — SIGNIFICANT CHANGE UP (ref 3.5–5.3)
POTASSIUM SERPL-SCNC: 3.9 MMOL/L — SIGNIFICANT CHANGE UP (ref 3.5–5.3)
POTASSIUM SERPL-SCNC: 4.4 MMOL/L — SIGNIFICANT CHANGE UP (ref 3.5–5.3)
PROT SERPL-MCNC: 5.5 G/DL — LOW (ref 6–8.3)
PROT SERPL-MCNC: 6 G/DL — SIGNIFICANT CHANGE UP (ref 6–8.3)
PROTHROM AB SERPL-ACNC: 17.2 SEC — HIGH (ref 10–12.9)
RBC # BLD: 3.64 M/UL — LOW (ref 3.8–5.2)
RBC # BLD: 3.98 M/UL — SIGNIFICANT CHANGE UP (ref 3.8–5.2)
RBC # FLD: 17.2 % — HIGH (ref 10.3–14.5)
RBC # FLD: 17.3 % — HIGH (ref 10.3–14.5)
SODIUM SERPL-SCNC: 132 MMOL/L — LOW (ref 135–145)
SODIUM SERPL-SCNC: 138 MMOL/L — SIGNIFICANT CHANGE UP (ref 135–145)
WBC # BLD: 7.9 K/UL — SIGNIFICANT CHANGE UP (ref 3.8–10.5)
WBC # BLD: 8.5 K/UL — SIGNIFICANT CHANGE UP (ref 3.8–10.5)
WBC # FLD AUTO: 7.9 K/UL — SIGNIFICANT CHANGE UP (ref 3.8–10.5)
WBC # FLD AUTO: 8.5 K/UL — SIGNIFICANT CHANGE UP (ref 3.8–10.5)

## 2019-07-14 PROCEDURE — 99233 SBSQ HOSP IP/OBS HIGH 50: CPT | Mod: GC

## 2019-07-14 PROCEDURE — 93010 ELECTROCARDIOGRAM REPORT: CPT

## 2019-07-14 RX ORDER — POTASSIUM CHLORIDE 20 MEQ
10 PACKET (EA) ORAL ONCE
Refills: 0 | Status: DISCONTINUED | OUTPATIENT
Start: 2019-07-14 | End: 2019-07-14

## 2019-07-14 RX ORDER — MAGNESIUM SULFATE 500 MG/ML
1 VIAL (ML) INJECTION ONCE
Refills: 0 | Status: COMPLETED | OUTPATIENT
Start: 2019-07-14 | End: 2019-07-14

## 2019-07-14 RX ORDER — POTASSIUM CHLORIDE 20 MEQ
20 PACKET (EA) ORAL ONCE
Refills: 0 | Status: COMPLETED | OUTPATIENT
Start: 2019-07-14 | End: 2019-07-14

## 2019-07-14 RX ORDER — BUDESONIDE, MICRONIZED 100 %
9 POWDER (GRAM) MISCELLANEOUS DAILY
Refills: 0 | Status: DISCONTINUED | OUTPATIENT
Start: 2019-07-14 | End: 2019-07-19

## 2019-07-14 RX ORDER — SENNA PLUS 8.6 MG/1
2 TABLET ORAL AT BEDTIME
Refills: 0 | Status: DISCONTINUED | OUTPATIENT
Start: 2019-07-14 | End: 2019-07-19

## 2019-07-14 RX ORDER — DOCUSATE SODIUM 100 MG
100 CAPSULE ORAL
Refills: 0 | Status: DISCONTINUED | OUTPATIENT
Start: 2019-07-14 | End: 2019-07-19

## 2019-07-14 RX ORDER — WARFARIN SODIUM 2.5 MG/1
5 TABLET ORAL ONCE
Refills: 0 | Status: DISCONTINUED | OUTPATIENT
Start: 2019-07-14 | End: 2019-07-14

## 2019-07-14 RX ADMIN — SENNA PLUS 2 TABLET(S): 8.6 TABLET ORAL at 21:19

## 2019-07-14 RX ADMIN — HEPARIN SODIUM 1700 UNIT(S)/HR: 5000 INJECTION INTRAVENOUS; SUBCUTANEOUS at 04:57

## 2019-07-14 RX ADMIN — Medication 9 MILLIGRAM(S): at 19:57

## 2019-07-14 RX ADMIN — CHLORHEXIDINE GLUCONATE 1 APPLICATION(S): 213 SOLUTION TOPICAL at 22:04

## 2019-07-14 RX ADMIN — SIMETHICONE 80 MILLIGRAM(S): 80 TABLET, CHEWABLE ORAL at 06:12

## 2019-07-14 RX ADMIN — LOSARTAN POTASSIUM 25 MILLIGRAM(S): 100 TABLET, FILM COATED ORAL at 06:06

## 2019-07-14 RX ADMIN — Medication 325 MILLIGRAM(S): at 08:49

## 2019-07-14 RX ADMIN — Medication 100 MILLIGRAM(S): at 17:26

## 2019-07-14 RX ADMIN — Medication 20 MILLIEQUIVALENT(S): at 06:06

## 2019-07-14 RX ADMIN — Medication 100 GRAM(S): at 06:06

## 2019-07-14 RX ADMIN — Medication 100 GRAM(S): at 19:57

## 2019-07-14 RX ADMIN — Medication 80 MILLIGRAM(S): at 06:05

## 2019-07-14 NOTE — CHART NOTE - NSCHARTNOTEFT_GEN_A_CORE
====================  CCU MIDNIGHT ROUNDS  ====================    FRANCE ROBB  [id number]    ====================  SUMMARY: HPI:  78 year old woman with Afib on Xarelto, HTN presented to OSH for SOB now transferred to [facility] for newly diagnosed acute decompensated systolic heart failure.      At the OSH, patient was found to be in afib with RVR and was given IV cardizem 10mg.  Patient was also loaded with digoxin.  Patient's lactate was 9.8 and received 2L IVF bolus.  Patient had TTE done which showed EF 20-25%, mod MR, mod TR, mod diffuse hypokinesis of LV.  She had an echo done in 2017 which showed EF 55-60%. Patient was given a few doses of IV lasix 40mg (last dose 7/11 at 2PM) but was hypotensive at 7/12 at 2 AM to 75/54 and was given 1L bolus then also and SBP in the 90s.  Patient did not get diuresis afterwards.  Patient was never placed on pressor support.  Patient was also started on amio load. Patient presented with leukocytosis of 16-17 but was afebrile.  There was concern for HAP and patient was started on vanc/cefepime.  Blood cx from 07/11 were negative. Patient had a CT angio which was negative for PE but b/l pleural effusions and CT abd/pelvis which showed gallbladder wall thickening and colonic diverticulossi without diverticulitis.  Abd US was done which showed cholithiasis and severe diffuse gallbladder wall thickening.  Surgery was consulted and recommended HIDA scan but no surgical intervention.  Over the course of the hospital, patient developed an NORM (1.41 from 0.85 on admission) and shock liver (AST 4473, ALT 2724, from normal levels.) Lactate trended down to 3.1. Patient transferred to [facility] for possible candidate for invasive cardiac transport.     At [facility], patient in fluid overload state and currently on 2L NC.  History is significant for progressive SOB since 01/19.  Patient would take a few steps and become SOB.  Patient does see a cardiologist Dr. Cerda as outpatient and started her on furosemide 40mg three times a week. Per family, patient's other outpatient medications included valsartan, HCTZ, cardizem but patient does not have a list of her medications. Patient did not receive an echo since 2017 and has never had a LHC/RHC.  Currently, patient endorses nausea and acid reflux and bloating.  Patient also complains of cough.  Has mild SOB but denies any chest pain, lightheadedness, dizziness. (12 Jul 2019 18:58)    ====================        ====================  NEW EVENTS:  Escamilla removed; TOV.   No acute events.   ====================        ====================  VITALS (Last 12 hrs):  ====================    T(C): 36.8 (07-14-19 @ 20:00), Max: 36.8 (07-14-19 @ 20:00)  HR: 104 (07-14-19 @ 20:00) (92 - 112)  BP: 89/59 (07-14-19 @ 20:00) (72/50 - 117/59)  BP(mean): 66 (07-14-19 @ 20:00) (61 - 85)  RR: 27 (07-14-19 @ 20:00) (20 - 35)  SpO2: 96% (07-14-19 @ 20:00) (95% - 98%)      TELEMETRY: sinus tach        I&O's Summary    13 Jul 2019 07:01  -  14 Jul 2019 07:00  --------------------------------------------------------  IN: 1370 mL / OUT: 4845 mL / NET: -3475 mL    14 Jul 2019 07:01  -  14 Jul 2019 20:25  --------------------------------------------------------  IN: 321 mL / OUT: 1650 mL / NET: -1329 mL        ====================  PLAN:  # New Acute HFrEF  - TTE showing EF: 20-25%, mod MR, mod TR, mod diffuse hypokinesis of LV.  She had an echo done in 2017 which showed EF 55-60%  - will need LHC and RHC to evaluate ischemic vs NICM likely tomorrow  - c/w lasix 80mg IVP qd and PRN if U/O declines  - escamilla removed  - strict I/Os, lytes, daily weights  - hold BB in decompensated state  - c/w losartan  - congestive hepatopathy improving    # Afib   - CHADVASC: 5  - holding rivoraxaban, c/w heparin gtt as pt will need cath this admission  - holding BB in setting of ADHF  ====================    HEALTH ISSUES - PROBLEM Dx:        Meme Luque, CCU PA #[id number] ====================  CCU MIDNIGHT ROUNDS  ====================    FRANCE ROBB  [id number]    ====================  SUMMARY: HPI:  78 year old woman with Afib on Xarelto, HTN presented to OSH for SOB now transferred to [facility] for newly diagnosed acute decompensated systolic heart failure.      At the OSH, patient was found to be in afib with RVR and was given IV cardizem 10mg.  Patient was also loaded with digoxin.  Patient's lactate was 9.8 and received 2L IVF bolus.  Patient had TTE done which showed EF 20-25%, mod MR, mod TR, mod diffuse hypokinesis of LV.  She had an echo done in 2017 which showed EF 55-60%. Patient was given a few doses of IV lasix 40mg (last dose 7/11 at 2PM) but was hypotensive at 7/12 at 2 AM to 75/54 and was given 1L bolus then also and SBP in the 90s.  Patient did not get diuresis afterwards.  Patient was never placed on pressor support.  Patient was also started on amio load. Patient presented with leukocytosis of 16-17 but was afebrile.  There was concern for HAP and patient was started on vanc/cefepime.  Blood cx from 07/11 were negative. Patient had a CT angio which was negative for PE but b/l pleural effusions and CT abd/pelvis which showed gallbladder wall thickening and colonic diverticulossi without diverticulitis.  Abd US was done which showed cholithiasis and severe diffuse gallbladder wall thickening.  Surgery was consulted and recommended HIDA scan but no surgical intervention.  Over the course of the hospital, patient developed an NORM (1.41 from 0.85 on admission) and shock liver (AST 4473, ALT 2724, from normal levels.) Lactate trended down to 3.1. Patient transferred to [facility] for possible candidate for invasive cardiac transport.     At [facility], patient in fluid overload state and currently on 2L NC.  History is significant for progressive SOB since 01/19.  Patient would take a few steps and become SOB.  Patient does see a cardiologist Dr. Cerda as outpatient and started her on furosemide 40mg three times a week. Per family, patient's other outpatient medications included valsartan, HCTZ, cardizem but patient does not have a list of her medications. Patient did not receive an echo since 2017 and has never had a LHC/RHC.  Currently, patient endorses nausea and acid reflux and bloating.  Patient also complains of cough.  Has mild SOB but denies any chest pain, lightheadedness, dizziness. (12 Jul 2019 18:58)    ====================        ====================  NEW EVENTS:  Escamilla removed; TOV.   No acute events.   ====================        ====================  VITALS (Last 12 hrs):  ====================    T(C): 36.8 (07-14-19 @ 20:00), Max: 36.8 (07-14-19 @ 20:00)  HR: 104 (07-14-19 @ 20:00) (92 - 112)  BP: 89/59 (07-14-19 @ 20:00) (72/50 - 117/59)  BP(mean): 66 (07-14-19 @ 20:00) (61 - 85)  RR: 27 (07-14-19 @ 20:00) (20 - 35)  SpO2: 96% (07-14-19 @ 20:00) (95% - 98%)      TELEMETRY: toya        I&O's Summary    13 Jul 2019 07:01  -  14 Jul 2019 07:00  --------------------------------------------------------  IN: 1370 mL / OUT: 4845 mL / NET: -3475 mL    14 Jul 2019 07:01  -  14 Jul 2019 20:25  --------------------------------------------------------  IN: 321 mL / OUT: 1650 mL / NET: -1329 mL        ====================  PLAN:  # New Acute HFrEF  - TTE showing EF: 20-25%, mod MR, mod TR, mod diffuse hypokinesis of LV.  She had an echo done in 2017 which showed EF 55-60%  - will need LHC and RHC to evaluate ischemic vs NICM likely tomorrow  - c/w lasix 80mg IVP qd and PRN if U/O declines  - escamilla removed  - strict I/Os, lytes, daily weights  - hold BB in decompensated state  - c/w losartan  - congestive hepatopathy improving    # Afib   - CHADVASC: 5  - holding rivoraxaban, c/w heparin gtt as pt will need cath this admission  - holding BB in setting of ADHF  ====================    HEALTH ISSUES - PROBLEM Dx:        Meme Luque, CCU PA #[id number]

## 2019-07-14 NOTE — PROGRESS NOTE ADULT - SUBJECTIVE AND OBJECTIVE BOX
PATIENT:  FRANCE ROBB  [id number]    CHIEF COMPLAINT:  Patient is a 78y old  Female who presents with a chief complaint of     78 year old woman with Afib on Xarelto, HTN presented to OSH for SOB now transferred to [facility] for newly diagnosed acute decompensated systolic heart failure.      At the OSH, patient was found to be in afib with RVR and was given IV cardizem 10mg.  Patient was also loaded with digoxin.  Patient's lactate was 9.8 and received 2L IVF bolus.  Patient had TTE done which showed EF 20-25%, mod MR, mod TR, mod diffuse hypokinesis of LV.  She had an echo done in  which showed EF 55-60%. Patient was given a few doses of IV lasix 40mg (last dose  at 2PM) but was hypotensive at  at 2 AM to 75/54 and was given 1L bolus then also and SBP in the 90s.  Patient did not get diuresis afterwards.  Patient was never placed on pressor support.  Patient was also started on amio load. Patient presented with leukocytosis of 16-17 but was afebrile.  There was concern for HAP and patient was started on vanc/cefepime.  Blood cx from  were negative. Patient had a CT angio which was negative for PE but b/l pleural effusions and CT abd/pelvis which showed gallbladder wall thickening and colonic diverticulossi without diverticulitis.  Abd US was done which showed cholithiasis and severe diffuse gallbladder wall thickening.  Surgery was consulted and recommended HIDA scan but no surgical intervention.  Over the course of the hospital, patient developed an NORM (1.41 from 0.85 on admission) and shock liver (AST 4473, ALT 2724, from normal levels.) Lactate trended down to 3.1. Patient transferred to [facility] for possible candidate for invasive cardiac transport.     At [facility], patient in fluid overload state and currently on 2L NC.  History is significant for progressive SOB since .  Patient would take a few steps and become SOB.  Patient does see a cardiologist Dr. Cerda as outpatient and started her on furosemide 40mg three times a week. Per family, patient's other outpatient medications included valsartan, HCTZ, cardizem but patient does not have a list of her medications. Patient did not receive an echo since  and has never had a LHC/RHC.  Currently, patient endorses nausea and acid reflux and bloating.  Patient also complains of cough.  On admission, had mild SOB but denies any chest pain, lightheadedness, dizziness.     No issues overnight    INTERVAL HISTORYOVERNIGHT EVENTS:      REVIEW OF SYSTEMS:    Constitutional:     [ ] negative [ ] fevers [ ] chills [ ] weight loss [ ] weight gain  HEENT:                  [ ] negative [ ] dry eyes [ ] eye irritation [ ] postnasal drip [ ] nasal congestion  CV:                         [ ] negative  [ ] chest pain [ ] orthopnea [ ] palpitations [ ] murmur  Resp:                     [ ] negative [ ] cough [ ] shortness of breath [ ] dyspnea [ ] wheezing [ ] sputum [ ] hemoptysis  GI:                          [ ] negative [ ] nausea [ ] vomiting [ ] diarrhea [ ] constipation [ ] abd pain [ ] dysphagia   :                        [ ] negative [ ] dysuria [ ] nocturia [ ] hematuria [ ] increased urinary frequency  Musculoskeletal: [ ] negative [ ] back pain [ ] myalgias [ ] arthralgias [ ] fracture  Skin:                       [ ] negative [ ] rash [ ] itch  Neurological:        [ ] negative [ ] headache [ ] dizziness [ ] syncope [ ] weakness [ ] numbness  Psychiatric:           [ ] negative [ ] anxiety [ ] depression  Endocrine:            [ ] negative [ ] diabetes [ ] thyroid problem  Heme/Lymph:      [ ] negative [ ] anemia [ ] bleeding problem  Allergic/Immune: [ ] negative [ ] itchy eyes [ ] nasal discharge [ ] hives [ ] angioedema    [ ] All other systems negative  [ ] Unable to assess ROS because ________.    MEDICATIONS:  MEDICATIONS  (STANDING):  chlorhexidine 2% Cloths 1 Application(s) Topical at bedtime  ferrous    sulfate 325 milliGRAM(s) Oral daily  furosemide   Injectable 80 milliGRAM(s) IV Push daily  heparin  Infusion.  Unit(s)/Hr (17 mL/Hr) IV Continuous <Continuous>  losartan 25 milliGRAM(s) Oral daily    MEDICATIONS  (PRN):  heparin  Injectable 7500 Unit(s) IV Push every 6 hours PRN For aPTT less than 40  heparin  Injectable 3500 Unit(s) IV Push every 6 hours PRN For aPTT between 40 - 57  melatonin 3 milliGRAM(s) Oral at bedtime PRN Insomnia  simethicone 80 milliGRAM(s) Chew every 6 hours PRN Indigestion      ALLERGIES:  Allergies    No Known Allergies    Intolerances        OBJECTIVE:  ICU Vital Signs Last 24 Hrs  T(C): 37.3 (2019 04:00), Max: 37.5 (2019 20:00)  T(F): 99.2 (2019 04:00), Max: 99.5 (2019 20:00)  HR: 100 (2019 07:00) (98 - 130)  BP: 102/54 (2019 07:00) (91/56 - 140/62)  BP(mean): 72 (2019 07:00) (60 - 94)  ABP: --  ABP(mean): --  RR: 22 (2019 07:00) (17 - 28)  SpO2: 95% (2019 07:00) (94% - 100%)      Adult Advanced Hemodynamics Last 24 Hrs  CVP(mm Hg): --  CVP(cm H2O): --  CO: --  CI: --  PA: --  PA(mean): --  PCWP: --  SVR: --  SVRI: --  PVR: --  PVRI: --  CAPILLARY BLOOD GLUCOSE        CAPILLARY BLOOD GLUCOSE      POCT Blood Glucose.: 128 mg/dL (2019 12:46)    I&O's Summary    2019 07:01  -  2019 07:00  --------------------------------------------------------  IN: 1370 mL / OUT: 4845 mL / NET: -3475 mL      Daily     Daily Weight in k.8 (2019 03:00)    PHYSICAL EXAMINATION:  General: WN/WD NAD  HEENT: PERRLA, EOMI, moist mucous membranes  Neurology: A&Ox3, nonfocal, BARNEY x 4  Respiratory: CTA B/L, normal respiratory effort, no wheezes, crackles, rales  CV: RRR, S1S2, no murmurs, rubs or gallops  Abdominal: Soft, NT, ND +BS, Last BM  Extremities: No edema, + peripheral pulses  Incisions:   Tubes:    LABS:                          9.5    7.9   )-----------( 204      ( 2019 04:29 )             29.2     07-    138  |  96  |  23  ----------------------------<  132<H>  3.9   |  27  |  0.79    Ca    7.3<L>      2019 04:29  Phos  3.0       Mg     1.9         TPro  5.5<L>  /  Alb  3.0<L>  /  TBili  0.7  /  DBili  x   /  AST  659<H>  /  ALT  1525<H>  /  AlkPhos  67  14    LIVER FUNCTIONS - ( 2019 04:29 )  Alb: 3.0 g/dL / Pro: 5.5 g/dL / ALK PHOS: 67 U/L / ALT: 1525 U/L / AST: 659 U/L / GGT: x           PT/INR - ( 2019 04:29 )   PT: 17.2 sec;   INR: 1.49 ratio         PTT - ( 2019 04:29 )  PTT:64.5 sec    CARDIAC MARKERS ( 2019 19:58 )  x     / x     / 97 U/L / x     / 3.6 ng/mL          TELEMETRY:     EKG:     IMAGING:

## 2019-07-14 NOTE — PROGRESS NOTE ADULT - ASSESSMENT
A/P:  78 year old woman with Afib on Xarelto, HTN presented to OSH for SOB now transferred to [facility] for newly diagnosed acute decompensated systolic heart failure currently undergoing diuresis.    Neuro:  -no active issues    Cardiovascular:    1. Acute decompensated systolic heart failure  -TTE from OSH showed EF: 20-25%, mod MR, mod TR, mod diffuse hypokinesis of LV.  She had an echo done in 2017 which showed EF 55-60%. Unclear etiology ischemic vs non-ischemic  -BNP 65748  -IV lasix 80mg now  -monitor UOP, strict I/O; has escamilla in place  -f/u UOP and dose Lasix appropriately  -holding BB in setting of ADHF  -holding ACEi/ARB in setting of NORM  -will need cardiac cath to evaluate for ischemic causes of ADHF   starting hydral 10 tid for afterload reduction  goal uop is >150 ml/hr, dose bumex when this falls below this threshold    2. Afib   -CHADVASC: 5  -holding rivoraxaban, c/w heparin ggt  -holding BB in setting of ADHF  -on tele, may be afib with aberrancy; f/u digoxin level     3. HTN  -holding anti-hypertensives in setting of low BP    Pulmonary  -currently on 2L NC, not requiring supplementation d/c NC prn  -continue with diuresis as above  -continue to monitor    GI  1. Congestive hepatopathy  -LFTs peaked at AST/ALT  4473/2724  -currently trending down  -continue to monitor       2. Nausea  -c/o of nausea, QTC is 531 most likely in the setting of afib with aberrancy   -monitor QTC and zofran as needed    3. Gallbladder thickening  -at OSH, CT abd/pelvis which showed gallbladder wall thickening and colonic diverticulossi without diverticulitis.  Abd US was done which showed cholithiasis and severe diffuse gallbladder wall thickening.  Surgery was consulted and recommended HIDA scan but no surgical intervention. Patient not actively having symptoms; will hold off on HIDA scan.    Renal:  1. NORM  -most likely in the 2/2 of low flow state  -continue to monitor Cr  -currently improving    ID:  -at OSH, was on vanc/cefepime for ?HAP  -blood cx 7/11: NGTD  -will continue to monitor off abx    Heme:  -chronic anemia from iron-deficiency anemia (past Iron studies done in 07/19)  -continue to monitor CBC  -c/w ferrous sulfate A/P:  78 year old woman with Afib on Xarelto, HTN presented to OSH for SOB now transferred to [facility] for newly diagnosed acute decompensated systolic heart failure currently undergoing diuresis.    Neuro:  -no active issues    Cardiovascular:    1. Acute decompensated systolic heart failure  -TTE from OSH showed EF: 20-25%, mod MR, mod TR, mod diffuse hypokinesis of LV.  She had an echo done in 2017 which showed EF 55-60%. Unclear etiology ischemic vs non-ischemic  -BNP 49807  -s/p IV lasix 80mg  -had good diuresis  -f/u UOP and dose Lasix appropriately  -holding BB in setting of ADHF  -losartan 25mg  -will need cardiac cath to evaluate for ischemic causes of ADHF   -UOP is consistently have >100mL/hr. If not, redose IV lasix    2. Afib   -CHADVASC: 5  -holding rivoraxaban, c/w heparin ggt  -holding BB in setting of ADHF  -on tele, may be afib with aberrancy; f/u digoxin level     Pulmonary  -currently on 2L NC, not requiring supplementation d/c NC prn  -continue with diuresis as above  -continue to monitor    GI  1. Congestive hepatopathy  -LFTs peaked at AST/ALT  4473/2724  -currently trending down  -continue to monitor     2. Nausea  -c/o of nausea, QTC is 531 most likely in the setting of afib with aberrancy   -monitor QTC and zofran as needed  -now resolved    3. Gallbladder thickening  -at OSH, CT abd/pelvis which showed gallbladder wall thickening and colonic diverticulossi without diverticulitis.  Abd US was done which showed cholithiasis and severe diffuse gallbladder wall thickening.  Surgery was consulted and recommended HIDA scan but no surgical intervention. Patient not actively having symptoms; will hold off on HIDA scan.    Renal:  1. NORM  -most likely in the 2/2 of low flow state  -continue to monitor Cr  -currently normalized    ID:  -at OSH, was on vanc/cefepime for ?HAP  -blood cx 7/11: NGTD  -will continue to monitor off abx  -normalized    Heme:  -chronic anemia from iron-deficiency anemia (past Iron studies done in 07/19)  -continue to monitor CBC  -currently stable  -c/w ferrous sulfate

## 2019-07-15 DIAGNOSIS — I50.21 ACUTE SYSTOLIC (CONGESTIVE) HEART FAILURE: ICD-10-CM

## 2019-07-15 DIAGNOSIS — N17.9 ACUTE KIDNEY FAILURE, UNSPECIFIED: ICD-10-CM

## 2019-07-15 LAB
ALBUMIN SERPL ELPH-MCNC: 3 G/DL — LOW (ref 3.3–5)
ALBUMIN SERPL ELPH-MCNC: 3.4 G/DL — SIGNIFICANT CHANGE UP (ref 3.3–5)
ALP SERPL-CCNC: 67 U/L — SIGNIFICANT CHANGE UP (ref 40–120)
ALP SERPL-CCNC: 73 U/L — SIGNIFICANT CHANGE UP (ref 40–120)
ALT FLD-CCNC: 1119 U/L — HIGH (ref 10–45)
ALT FLD-CCNC: 1168 U/L — HIGH (ref 10–45)
ANION GAP SERPL CALC-SCNC: 13 MMOL/L — SIGNIFICANT CHANGE UP (ref 5–17)
ANION GAP SERPL CALC-SCNC: 17 MMOL/L — SIGNIFICANT CHANGE UP (ref 5–17)
APTT BLD: 66.6 SEC — HIGH (ref 27.5–36.3)
AST SERPL-CCNC: 250 U/L — HIGH (ref 10–40)
AST SERPL-CCNC: 342 U/L — HIGH (ref 10–40)
BASOPHILS # BLD AUTO: 0.1 K/UL — SIGNIFICANT CHANGE UP (ref 0–0.2)
BASOPHILS NFR BLD AUTO: 0.7 % — SIGNIFICANT CHANGE UP (ref 0–2)
BILIRUB SERPL-MCNC: 0.7 MG/DL — SIGNIFICANT CHANGE UP (ref 0.2–1.2)
BILIRUB SERPL-MCNC: 0.8 MG/DL — SIGNIFICANT CHANGE UP (ref 0.2–1.2)
BUN SERPL-MCNC: 20 MG/DL — SIGNIFICANT CHANGE UP (ref 7–23)
BUN SERPL-MCNC: 24 MG/DL — HIGH (ref 7–23)
CALCIUM SERPL-MCNC: 7.6 MG/DL — LOW (ref 8.4–10.5)
CALCIUM SERPL-MCNC: 7.8 MG/DL — LOW (ref 8.4–10.5)
CHLORIDE SERPL-SCNC: 91 MMOL/L — LOW (ref 96–108)
CHLORIDE SERPL-SCNC: 95 MMOL/L — LOW (ref 96–108)
CO2 SERPL-SCNC: 29 MMOL/L — SIGNIFICANT CHANGE UP (ref 22–31)
CO2 SERPL-SCNC: 30 MMOL/L — SIGNIFICANT CHANGE UP (ref 22–31)
CREAT SERPL-MCNC: 0.66 MG/DL — SIGNIFICANT CHANGE UP (ref 0.5–1.3)
CREAT SERPL-MCNC: 0.72 MG/DL — SIGNIFICANT CHANGE UP (ref 0.5–1.3)
EOSINOPHIL # BLD AUTO: 0.1 K/UL — SIGNIFICANT CHANGE UP (ref 0–0.5)
EOSINOPHIL NFR BLD AUTO: 0.9 % — SIGNIFICANT CHANGE UP (ref 0–6)
GLUCOSE SERPL-MCNC: 164 MG/DL — HIGH (ref 70–99)
GLUCOSE SERPL-MCNC: 177 MG/DL — HIGH (ref 70–99)
HCT VFR BLD CALC: 29.7 % — LOW (ref 34.5–45)
HCT VFR BLD CALC: 33.5 % — LOW (ref 34.5–45)
HGB BLD-MCNC: 10.6 G/DL — LOW (ref 11.5–15.5)
HGB BLD-MCNC: 9.5 G/DL — LOW (ref 11.5–15.5)
INR BLD: 1.24 RATIO — HIGH (ref 0.88–1.16)
LYMPHOCYTES # BLD AUTO: 1 K/UL — SIGNIFICANT CHANGE UP (ref 1–3.3)
LYMPHOCYTES # BLD AUTO: 13.2 % — SIGNIFICANT CHANGE UP (ref 13–44)
MAGNESIUM SERPL-MCNC: 2.2 MG/DL — SIGNIFICANT CHANGE UP (ref 1.6–2.6)
MAGNESIUM SERPL-MCNC: 2.3 MG/DL — SIGNIFICANT CHANGE UP (ref 1.6–2.6)
MCHC RBC-ENTMCNC: 25.7 PG — LOW (ref 27–34)
MCHC RBC-ENTMCNC: 25.9 PG — LOW (ref 27–34)
MCHC RBC-ENTMCNC: 31.7 GM/DL — LOW (ref 32–36)
MCHC RBC-ENTMCNC: 32 GM/DL — SIGNIFICANT CHANGE UP (ref 32–36)
MCV RBC AUTO: 80.9 FL — SIGNIFICANT CHANGE UP (ref 80–100)
MCV RBC AUTO: 81.1 FL — SIGNIFICANT CHANGE UP (ref 80–100)
MONOCYTES # BLD AUTO: 1.1 K/UL — HIGH (ref 0–0.9)
MONOCYTES NFR BLD AUTO: 13.9 % — SIGNIFICANT CHANGE UP (ref 2–14)
NEUTROPHILS # BLD AUTO: 5.5 K/UL — SIGNIFICANT CHANGE UP (ref 1.8–7.4)
NEUTROPHILS NFR BLD AUTO: 71.3 % — SIGNIFICANT CHANGE UP (ref 43–77)
PHOSPHATE SERPL-MCNC: 2.5 MG/DL — SIGNIFICANT CHANGE UP (ref 2.5–4.5)
PHOSPHATE SERPL-MCNC: 2.9 MG/DL — SIGNIFICANT CHANGE UP (ref 2.5–4.5)
PLATELET # BLD AUTO: 227 K/UL — SIGNIFICANT CHANGE UP (ref 150–400)
PLATELET # BLD AUTO: 314 K/UL — SIGNIFICANT CHANGE UP (ref 150–400)
POTASSIUM SERPL-MCNC: 3.9 MMOL/L — SIGNIFICANT CHANGE UP (ref 3.5–5.3)
POTASSIUM SERPL-MCNC: 4 MMOL/L — SIGNIFICANT CHANGE UP (ref 3.5–5.3)
POTASSIUM SERPL-SCNC: 3.9 MMOL/L — SIGNIFICANT CHANGE UP (ref 3.5–5.3)
POTASSIUM SERPL-SCNC: 4 MMOL/L — SIGNIFICANT CHANGE UP (ref 3.5–5.3)
PROT SERPL-MCNC: 5.5 G/DL — LOW (ref 6–8.3)
PROT SERPL-MCNC: 6.3 G/DL — SIGNIFICANT CHANGE UP (ref 6–8.3)
PROTHROM AB SERPL-ACNC: 14.3 SEC — HIGH (ref 10–12.9)
RAPID RVP RESULT: SIGNIFICANT CHANGE UP
RBC # BLD: 3.67 M/UL — LOW (ref 3.8–5.2)
RBC # BLD: 4.14 M/UL — SIGNIFICANT CHANGE UP (ref 3.8–5.2)
RBC # FLD: 16.9 % — HIGH (ref 10.3–14.5)
RBC # FLD: 17.5 % — HIGH (ref 10.3–14.5)
SODIUM SERPL-SCNC: 137 MMOL/L — SIGNIFICANT CHANGE UP (ref 135–145)
SODIUM SERPL-SCNC: 138 MMOL/L — SIGNIFICANT CHANGE UP (ref 135–145)
WBC # BLD: 5.7 K/UL — SIGNIFICANT CHANGE UP (ref 3.8–10.5)
WBC # BLD: 7.8 K/UL — SIGNIFICANT CHANGE UP (ref 3.8–10.5)
WBC # FLD AUTO: 5.7 K/UL — SIGNIFICANT CHANGE UP (ref 3.8–10.5)
WBC # FLD AUTO: 7.8 K/UL — SIGNIFICANT CHANGE UP (ref 3.8–10.5)

## 2019-07-15 PROCEDURE — 71045 X-RAY EXAM CHEST 1 VIEW: CPT | Mod: 26

## 2019-07-15 PROCEDURE — 99152 MOD SED SAME PHYS/QHP 5/>YRS: CPT | Mod: GC

## 2019-07-15 PROCEDURE — 99233 SBSQ HOSP IP/OBS HIGH 50: CPT | Mod: GC

## 2019-07-15 PROCEDURE — 93460 R&L HRT ART/VENTRICLE ANGIO: CPT | Mod: 26,GC

## 2019-07-15 PROCEDURE — 93010 ELECTROCARDIOGRAM REPORT: CPT | Mod: 76

## 2019-07-15 PROCEDURE — 93010 ELECTROCARDIOGRAM REPORT: CPT

## 2019-07-15 RX ORDER — HEPARIN SODIUM 5000 [USP'U]/ML
7500 INJECTION INTRAVENOUS; SUBCUTANEOUS EVERY 6 HOURS
Refills: 0 | Status: DISCONTINUED | OUTPATIENT
Start: 2019-07-15 | End: 2019-07-18

## 2019-07-15 RX ORDER — FUROSEMIDE 40 MG
40 TABLET ORAL DAILY
Refills: 0 | Status: DISCONTINUED | OUTPATIENT
Start: 2019-07-16 | End: 2019-07-16

## 2019-07-15 RX ORDER — HEPARIN SODIUM 5000 [USP'U]/ML
4000 INJECTION INTRAVENOUS; SUBCUTANEOUS EVERY 6 HOURS
Refills: 0 | Status: DISCONTINUED | OUTPATIENT
Start: 2019-07-15 | End: 2019-07-15

## 2019-07-15 RX ORDER — PANTOPRAZOLE SODIUM 20 MG/1
40 TABLET, DELAYED RELEASE ORAL EVERY 24 HOURS
Refills: 0 | Status: DISCONTINUED | OUTPATIENT
Start: 2019-07-15 | End: 2019-07-19

## 2019-07-15 RX ORDER — HEPARIN SODIUM 5000 [USP'U]/ML
3500 INJECTION INTRAVENOUS; SUBCUTANEOUS EVERY 6 HOURS
Refills: 0 | Status: DISCONTINUED | OUTPATIENT
Start: 2019-07-15 | End: 2019-07-18

## 2019-07-15 RX ORDER — FUROSEMIDE 40 MG
40 TABLET ORAL DAILY
Refills: 0 | Status: DISCONTINUED | OUTPATIENT
Start: 2019-07-15 | End: 2019-07-15

## 2019-07-15 RX ORDER — AMIODARONE HYDROCHLORIDE 400 MG/1
200 TABLET ORAL DAILY
Refills: 0 | Status: DISCONTINUED | OUTPATIENT
Start: 2019-07-19 | End: 2019-07-19

## 2019-07-15 RX ORDER — FAMOTIDINE 10 MG/ML
20 INJECTION INTRAVENOUS AT BEDTIME
Refills: 0 | Status: DISCONTINUED | OUTPATIENT
Start: 2019-07-15 | End: 2019-07-19

## 2019-07-15 RX ORDER — HEPARIN SODIUM 5000 [USP'U]/ML
INJECTION INTRAVENOUS; SUBCUTANEOUS
Qty: 25000 | Refills: 0 | Status: DISCONTINUED | OUTPATIENT
Start: 2019-07-16 | End: 2019-07-18

## 2019-07-15 RX ORDER — POTASSIUM CHLORIDE 20 MEQ
10 PACKET (EA) ORAL ONCE
Refills: 0 | Status: DISCONTINUED | OUTPATIENT
Start: 2019-07-15 | End: 2019-07-15

## 2019-07-15 RX ORDER — FUROSEMIDE 40 MG
40 TABLET ORAL ONCE
Refills: 0 | Status: COMPLETED | OUTPATIENT
Start: 2019-07-15 | End: 2019-07-15

## 2019-07-15 RX ORDER — HEPARIN SODIUM 5000 [USP'U]/ML
INJECTION INTRAVENOUS; SUBCUTANEOUS
Qty: 25000 | Refills: 0 | Status: DISCONTINUED | OUTPATIENT
Start: 2019-07-15 | End: 2019-07-15

## 2019-07-15 RX ORDER — AMIODARONE HYDROCHLORIDE 400 MG/1
TABLET ORAL
Refills: 0 | Status: DISCONTINUED | OUTPATIENT
Start: 2019-07-15 | End: 2019-07-19

## 2019-07-15 RX ORDER — AMIODARONE HYDROCHLORIDE 400 MG/1
400 TABLET ORAL EVERY 8 HOURS
Refills: 0 | Status: COMPLETED | OUTPATIENT
Start: 2019-07-15 | End: 2019-07-19

## 2019-07-15 RX ORDER — POTASSIUM CHLORIDE 20 MEQ
20 PACKET (EA) ORAL ONCE
Refills: 0 | Status: COMPLETED | OUTPATIENT
Start: 2019-07-15 | End: 2019-07-15

## 2019-07-15 RX ADMIN — Medication 100 MILLIGRAM(S): at 16:32

## 2019-07-15 RX ADMIN — PANTOPRAZOLE SODIUM 40 MILLIGRAM(S): 20 TABLET, DELAYED RELEASE ORAL at 08:18

## 2019-07-15 RX ADMIN — LOSARTAN POTASSIUM 25 MILLIGRAM(S): 100 TABLET, FILM COATED ORAL at 06:07

## 2019-07-15 RX ADMIN — SENNA PLUS 2 TABLET(S): 8.6 TABLET ORAL at 21:29

## 2019-07-15 RX ADMIN — Medication 325 MILLIGRAM(S): at 11:11

## 2019-07-15 RX ADMIN — Medication 9 MILLIGRAM(S): at 05:38

## 2019-07-15 RX ADMIN — Medication 100 MILLIGRAM(S): at 12:24

## 2019-07-15 RX ADMIN — Medication 100 MILLIGRAM(S): at 20:00

## 2019-07-15 RX ADMIN — Medication 100 MILLIGRAM(S): at 21:28

## 2019-07-15 RX ADMIN — FAMOTIDINE 20 MILLIGRAM(S): 10 INJECTION INTRAVENOUS at 21:29

## 2019-07-15 RX ADMIN — Medication 40 MILLIGRAM(S): at 20:53

## 2019-07-15 RX ADMIN — Medication 100 MILLIGRAM(S): at 21:29

## 2019-07-15 RX ADMIN — Medication 20 MILLIEQUIVALENT(S): at 20:00

## 2019-07-15 RX ADMIN — Medication 100 MILLIGRAM(S): at 05:38

## 2019-07-15 RX ADMIN — AMIODARONE HYDROCHLORIDE 400 MILLIGRAM(S): 400 TABLET ORAL at 21:37

## 2019-07-15 RX ADMIN — HEPARIN SODIUM 1700 UNIT(S)/HR: 5000 INJECTION INTRAVENOUS; SUBCUTANEOUS at 04:48

## 2019-07-15 RX ADMIN — Medication 80 MILLIGRAM(S): at 05:38

## 2019-07-15 NOTE — CONSULT NOTE ADULT - PROBLEM SELECTOR RECOMMENDATION 9
TTE with LA 4.9, LVIDd 4.7, EF 30%, RV enlargement with decreased RV systolic function, PASP 62. TTE from 2017 with EF 55-60%, mod-sev MR.  - continue losartan 25mg PO daily  - continue furosemide 80mg IV daily TTE with LA 4.9, LVIDd 4.7, EF 30%, RV enlargement with decreased RV systolic function, PASP 62. TTE from 2017 with EF 55-60%, mod-sev MR.  - continue losartan 25mg PO daily  - continue furosemide 80mg IV daily  - f/u R/LHC  - strict Is/Os  - daily weights  - 1L fluid restriction  - K > 4, Mg > 2 TTE with LA 4.9, LVIDd 4.7, EF 30%, RV enlargement with decreased RV systolic function, PASP 62. TTE from 2017 with EF 55-60%, mod-sev MR.  RHC with RA 14, RV 44/5, PA 46/20/30, PCW 25, LVEDP 10, MAP 85, PA sat 43  CO/CI 3.18/1.66  SVR 1792  - increase losartan to 25mg PO BID  - start aldactone 25mg PO daily, first dose now  - continue furosemide 40mg IV daily  - strict Is/Os  - daily weights  - 1L fluid restriction  - K > 4, Mg > 2

## 2019-07-15 NOTE — CONSULT NOTE ADULT - ATTENDING COMMENTS
Patient admitted to CCU over the weekend with acute systolic HF and newly depressed LVEF 25%. She responded well to diuresis and had LHC/RHC last night, without obstructive CAD and with elevated filling pressures and reduced CO. Her BP, however, was normal and she was on minimal afterload reduction. She was resumed on IV diuretics last night.    She currently feels well and has diuresed very well. Would add spironolactone 25 mg po daily - 1st dose now, please.  Increase losartan to 25 mg po BID tonight.  Would switch her to lasix 40 mg po daily for tomorrow so that we can continue to escalate the losartan and/or switch her to Entresto.  Repeat CMP given hemolysis on earlier sample.  Please send serum free light chains given restrictive physiology seen on TTE.

## 2019-07-15 NOTE — CONSULT NOTE ADULT - ASSESSMENT
77 yo F with a PMH significant for AF on Xarelto, HTN who presented to [facility] with SOB, found to be overloaded and in AF with RVR. She was found to be in ADHF 2/2 newly depressed EF(EF 30%) Hospital course there complicated by hypotension, NORM, and elevated transaminases so she was transferred to [facility] for further care. While in the CCU, she has been diuresed with IV lasix and responded well. Currently awaiting R/LHC.

## 2019-07-15 NOTE — PROGRESS NOTE ADULT - SUBJECTIVE AND OBJECTIVE BOX
PATIENT:  FRANCE ROBB  [id number]    CHIEF COMPLAINT:  Patient is a 78y old  Female who presents for newly diagnosed decompensated systolic HF.      INTERVAL HISTORYOVERNIGHT EVENTS:    -1L shift  -Lasix held  -Eliquis help for cath today    This morning, pt complains of a productive cough overnight, producing clear sputum. Pt's daughter is also requesting for PT to see her.       REVIEW OF SYSTEMS:    Constitutional:     [x ] negative [ ] fevers [ ] chills [ ] weight loss [ ] weight gain  HEENT:                  [x ] negative [ ] dry eyes [ ] eye irritation [ ] postnasal drip [ ] nasal congestion  CV:                         [x ] negative  [ ] chest pain [ ] orthopnea [ ] palpitations [ ] murmur  Resp:                     [ ] negative [x ] cough [ ] shortness of breath [ ] dyspnea [ ] wheezing [x ] sputum [ ] hemoptysis  GI:                          [x ] negative [ ] nausea [ ] vomiting [ ] diarrhea [ ] constipation [ ] abd pain [ ] dysphagia   :                        [x ] negative [ ] dysuria [ ] nocturia [ ] hematuria [ ] increased urinary frequency  Musculoskeletal: [x ] negative [ ] back pain [ ] myalgias [ ] arthralgias [ ] fracture  Skin:                       [x ] negative [ ] rash [ ] itch  Neurological:        [x ] negative [ ] headache [ ] dizziness [ ] syncope [ ] weakness [ ] numbness  Psychiatric:           [x ] negative [ ] anxiety [ ] depression  Endocrine:            [x ] negative [ ] diabetes [ ] thyroid problem  Heme/Lymph:      [ x] negative [ ] anemia [ ] bleeding problem  Allergic/Immune: [x ] negative [ ] itchy eyes [ ] nasal discharge [ ] hives [ ] angioedema    [ ] All other systems negative  [ ] Unable to assess ROS because ________.    MEDICATIONS:  MEDICATIONS  (STANDING):  buDESOnide    EC Capsule 9 milliGRAM(s) Oral daily  chlorhexidine 2% Cloths 1 Application(s) Topical at bedtime  docusate sodium 100 milliGRAM(s) Oral two times a day  ferrous    sulfate 325 milliGRAM(s) Oral daily  furosemide   Injectable 80 milliGRAM(s) IV Push daily  heparin  Infusion.  Unit(s)/Hr (17 mL/Hr) IV Continuous <Continuous>  losartan 25 milliGRAM(s) Oral daily  pantoprazole    Tablet 40 milliGRAM(s) Oral every 24 hours  senna 2 Tablet(s) Oral at bedtime    MEDICATIONS  (PRN):  heparin  Injectable 7500 Unit(s) IV Push every 6 hours PRN For aPTT less than 40  heparin  Injectable 3500 Unit(s) IV Push every 6 hours PRN For aPTT between 40 - 57  melatonin 3 milliGRAM(s) Oral at bedtime PRN Insomnia  simethicone 80 milliGRAM(s) Chew every 6 hours PRN Indigestion      ALLERGIES:  Allergies    No Known Allergies    Intolerances        OBJECTIVE:  ICU Vital Signs Last 24 Hrs  T(C): 36.6 (15 Jul 2019 07:00), Max: 37 (2019 23:00)  T(F): 97.9 (15 Jul 2019 07:00), Max: 98.6 (2019 23:00)  HR: 100 (15 Jul 2019 08:00) (86 - 118)  BP: 120/69 (15 Jul 2019 08:00) (72/50 - 120/69)  BP(mean): 84 (15 Jul 2019 08:00) (61 - 88)  ABP: --  ABP(mean): --  RR: 20 (15 Jul 2019 08:00) (18 - 37)  SpO2: 97% (15 Jul 2019 08:00) (93% - 98%)      Adult Advanced Hemodynamics Last 24 Hrs  CVP(mm Hg): --  CVP(cm H2O): --  CO: --  CI: --  PA: --  PA(mean): --  PCWP: --  SVR: --  SVRI: --  PVR: --  PVRI: --  CAPILLARY BLOOD GLUCOSE        CAPILLARY BLOOD GLUCOSE        I&O's Summary    2019 07:  -  15 Jul 2019 07:00  --------------------------------------------------------  IN: 858 mL / OUT: 2600 mL / NET: -1742 mL    15 Jul 2019 07:  -  15 Jul 2019 08:37  --------------------------------------------------------  IN: 34 mL / OUT: 475 mL / NET: -441 mL      Daily     Daily Weight in k.6 (15 Jul 2019 04:00)    PHYSICAL EXAMINATION:  General: WN/WD NAD  HEENT: PERRLA, EOMI, moist mucous membranes  Neurology: A&Ox3, nonfocal, BARNEY x 4  Respiratory: CTA B/L, normal respiratory effort, no wheezes, crackles, rales  CV: RRR, S1S2, no murmurs, rubs or gallops  Abdominal: Soft, NT, ND +BS, Last BM  Extremities: Peripheral edema present in LEs, + peripheral pulses  Incisions:   Tubes:    LABS:                          9.5    5.7   )-----------( 227      ( 15 Jul 2019 04:02 )             29.7     07-15    138  |  95<L>  |  20  ----------------------------<  177<H>  4.0   |  30  |  0.66    Ca    7.6<L>      15 Jul 2019 04:02  Phos  2.9     07-15  Mg     2.3     07-15    TPro  5.5<L>  /  Alb  3.0<L>  /  TBili  0.7  /  DBili  x   /  AST  342<H>  /  ALT  1168<H>  /  AlkPhos  67  07-15    LIVER FUNCTIONS - ( 15 Jul 2019 04:02 )  Alb: 3.0 g/dL / Pro: 5.5 g/dL / ALK PHOS: 67 U/L / ALT: 1168 U/L / AST: 342 U/L / GGT: x           PT/INR - ( 15 Jul 2019 04:02 )   PT: 14.3 sec;   INR: 1.24 ratio         PTT - ( 15 Jul 2019 04:02 )  PTT:66.6 sec            TELEMETRY:     EKG:     IMAGING: PATIENT:  FRANCE ROBB  [id number]    CHIEF COMPLAINT:  Patient is a 78y old  Female who presents for newly diagnosed decompensated systolic HF.      INTERVAL HISTORY/ OVERNIGHT EVENTS:  Overnight pt was net neg 1L; lasix was held.  Scheduled for cardiac cath this morning.      This morning, pt complains of a productive cough overnight, producing clear sputum. Pt's daughter is also requesting for PT to see her.       REVIEW OF SYSTEMS:    Constitutional:     [x ] negative [ ] fevers [ ] chills [ ] weight loss [ ] weight gain  HEENT:                  [x ] negative [ ] dry eyes [ ] eye irritation [ ] postnasal drip [ ] nasal congestion  CV:                         [x ] negative  [ ] chest pain [ ] orthopnea [ ] palpitations [ ] murmur  Resp:                     [ ] negative [x ] cough [ ] shortness of breath [ ] dyspnea [ ] wheezing [x ] sputum [ ] hemoptysis  GI:                          [x ] negative [ ] nausea [ ] vomiting [ ] diarrhea [ ] constipation [ ] abd pain [ ] dysphagia   :                        [x ] negative [ ] dysuria [ ] nocturia [ ] hematuria [ ] increased urinary frequency  Musculoskeletal: [x ] negative [ ] back pain [ ] myalgias [ ] arthralgias [ ] fracture  Skin:                       [x ] negative [ ] rash [ ] itch  Neurological:        [x ] negative [ ] headache [ ] dizziness [ ] syncope [ ] weakness [ ] numbness  Psychiatric:           [x ] negative [ ] anxiety [ ] depression  Endocrine:            [x ] negative [ ] diabetes [ ] thyroid problem  Heme/Lymph:      [ x] negative [ ] anemia [ ] bleeding problem  Allergic/Immune: [x ] negative [ ] itchy eyes [ ] nasal discharge [ ] hives [ ] angioedema    [ ] All other systems negative  [ ] Unable to assess ROS because ________.    MEDICATIONS:  MEDICATIONS  (STANDING):  buDESOnide    EC Capsule 9 milliGRAM(s) Oral daily  chlorhexidine 2% Cloths 1 Application(s) Topical at bedtime  docusate sodium 100 milliGRAM(s) Oral two times a day  ferrous    sulfate 325 milliGRAM(s) Oral daily  furosemide   Injectable 80 milliGRAM(s) IV Push daily  heparin  Infusion.  Unit(s)/Hr (17 mL/Hr) IV Continuous <Continuous>  losartan 25 milliGRAM(s) Oral daily  pantoprazole    Tablet 40 milliGRAM(s) Oral every 24 hours  senna 2 Tablet(s) Oral at bedtime    MEDICATIONS  (PRN):  heparin  Injectable 7500 Unit(s) IV Push every 6 hours PRN For aPTT less than 40  heparin  Injectable 3500 Unit(s) IV Push every 6 hours PRN For aPTT between 40 - 57  melatonin 3 milliGRAM(s) Oral at bedtime PRN Insomnia  simethicone 80 milliGRAM(s) Chew every 6 hours PRN Indigestion      ALLERGIES:  Allergies    No Known Allergies    Intolerances        OBJECTIVE:  ICU Vital Signs Last 24 Hrs  T(C): 36.6 (15 Jul 2019 07:00), Max: 37 (2019 23:00)  T(F): 97.9 (15 Jul 2019 07:00), Max: 98.6 (2019 23:00)  HR: 100 (15 Jul 2019 08:00) (86 - 118)  BP: 120/69 (15 Jul 2019 08:00) (72/50 - 120/69)  BP(mean): 84 (15 Jul 2019 08:00) (61 - 88)  ABP: --  ABP(mean): --  RR: 20 (15 Jul 2019 08:00) (18 - 37)  SpO2: 97% (15 Jul 2019 08:00) (93% - 98%)      Adult Advanced Hemodynamics Last 24 Hrs  CVP(mm Hg): --  CVP(cm H2O): --  CO: --  CI: --  PA: --  PA(mean): --  PCWP: --  SVR: --  SVRI: --  PVR: --  PVRI: --  CAPILLARY BLOOD GLUCOSE        CAPILLARY BLOOD GLUCOSE        I&O's Summary    2019 07:  -  15 Jul 2019 07:00  --------------------------------------------------------  IN: 858 mL / OUT: 2600 mL / NET: -1742 mL    15 Jul 2019 07:  -  15 Jul 2019 08:37  --------------------------------------------------------  IN: 34 mL / OUT: 475 mL / NET: -441 mL      Daily     Daily Weight in k.6 (15 Jul 2019 04:00)    PHYSICAL EXAMINATION:  General: WN/WD NAD  HEENT: PERRLA, EOMI, moist mucous membranes  Neurology: A&Ox3, nonfocal, BARNEY x 4  Respiratory: CTA B/L, normal respiratory effort, no wheezes, crackles, rales  CV: RRR, S1S2, no murmurs, rubs or gallops  Abdominal: Soft, NT, ND +BS, Last BM  Extremities: Peripheral edema present in LEs, + peripheral pulses  Incisions:   Tubes:    LABS:                          9.5    5.7   )-----------( 227      ( 15 Jul 2019 04:02 )             29.7     07-15    138  |  95<L>  |  20  ----------------------------<  177<H>  4.0   |  30  |  0.66    Ca    7.6<L>      15 Jul 2019 04:02  Phos  2.9     07-15  Mg     2.3     07-15    TPro  5.5<L>  /  Alb  3.0<L>  /  TBili  0.7  /  DBili  x   /  AST  342<H>  /  ALT  1168<H>  /  AlkPhos  67  07-15    LIVER FUNCTIONS - ( 15 Jul 2019 04:02 )  Alb: 3.0 g/dL / Pro: 5.5 g/dL / ALK PHOS: 67 U/L / ALT: 1168 U/L / AST: 342 U/L / GGT: x           PT/INR - ( 15 Jul 2019 04:02 )   PT: 14.3 sec;   INR: 1.24 ratio         PTT - ( 15 Jul 2019 04:02 )  PTT:66.6 sec            TELEMETRY: frequent PVCs    EKG:     IMAGING: CXR 7/15 - clear lungs

## 2019-07-15 NOTE — CHART NOTE - NSCHARTNOTEFT_GEN_A_CORE
Removal of Femoral Sheath    Pulses in the right lower extremity are palpable. The patient was placed in the supine position. The insertion site was identified and the sutures were removed per protocol.  The 5 [place] femoral sheath was then removed. Direct pressure was applied for  18 minutes. The 7Fr venous sheath was also removed.    Monitoring of the right groin and both lower extremities including neuro-vascular checks and vital signs every 15 minutes x 4, then every 30 minutes x 2, then every 1 hour was ordered.    Complications: None

## 2019-07-15 NOTE — CONSULT NOTE ADULT - SUBJECTIVE AND OBJECTIVE BOX
Patient seen and evaluated at bedside    Chief Complaint: SOB    HPI:  Ms Morin is a 79 yo F with a PMH significant for AF on Xarelto, HTN who presented to [facility] with SOB, found to be overloaded and in AF with RVR. She was treated with IV diltiazem for rate control. There was concern for sepsis given leukocytosis so she was given 2L NS. TTE later revealed EF 25% so she was admitted to CCU for diuresis. In addition to diuresis, she was also loaded with amio and digoxin. While in the CCU, she was given lasix and was apparently hypotensive to the 50s so she was given another liter bolus. Due to a belief that she may need mechanical support, she was transferred to [facility] on  for further management. Of note, while at [place], she developed NORM with Cr 1.46, elevated LFTs to the 4000s. Upon arrival to [facility], she was noted to be volume overloaded and was started on IV diuretics. Since admission, she is net neg 8.5L.      PMHx:   Gastrointestinal hemorrhage associated with intestinal diverticulosis  Afib  CHF (congestive heart failure)  Gastroesophageal reflux disease, esophagitis presence not specified  Essential hypertension      PSHx:   S/P right knee arthroscopy  H/O partial thyroidectomy      Allergies:  No Known Allergies      Home Meds:    Current Medications:   buDESOnide    EC Capsule 9 milliGRAM(s) Oral daily  chlorhexidine 2% Cloths 1 Application(s) Topical at bedtime  docusate sodium 100 milliGRAM(s) Oral two times a day  famotidine    Tablet 20 milliGRAM(s) Oral at bedtime  ferrous    sulfate 325 milliGRAM(s) Oral daily  furosemide   Injectable 80 milliGRAM(s) IV Push daily  heparin  Infusion.  Unit(s)/Hr IV Continuous <Continuous>  heparin  Injectable 7500 Unit(s) IV Push every 6 hours PRN  heparin  Injectable 3500 Unit(s) IV Push every 6 hours PRN  losartan 25 milliGRAM(s) Oral daily  melatonin 3 milliGRAM(s) Oral at bedtime PRN  pantoprazole    Tablet 40 milliGRAM(s) Oral every 24 hours  senna 2 Tablet(s) Oral at bedtime  simethicone 80 milliGRAM(s) Chew every 6 hours PRN      FAMILY HISTORY:  No pertinent family history in first degree relatives      Social History:  Smoking History: denies  Alcohol Use: denies  Drug Use: denies    REVIEW OF SYSTEMS:  CONSTITUTIONAL: No weakness, fevers or chills  EYES/ENT: No visual changes;  No dysphagia  NECK: No pain or stiffness  RESPIRATORY: + SOB, No cough, wheezing, hemoptysis  CARDIOVASCULAR: No chest pain or palpitations; No lower extremity edema  GASTROINTESTINAL: No abdominal or epigastric pain. No nausea, vomiting, or hematemesis; No diarrhea or constipation. No melena or hematochezia.  BACK: No back pain  GENITOURINARY: No dysuria, frequency or hematuria  NEUROLOGICAL: No numbness or weakness  SKIN: No itching, burning, rashes, or lesions   All other review of systems is negative unless indicated above.    Physical Exam:  T(F): 97.9 (-15), Max: 98.6 ()  HR: 114 (07-15) (86 - 118)  BP: 107/63 (07-15) (72/50 - 122/70)  RR: 18 (-15)  SpO2: 98% (07-15)  GENERAL: No acute distress, well-developed  HEAD:  Atraumatic, Normocephalic  ENT: EOMI, PERRLA, conjunctiva and sclera clear, Neck supple, No JVD, moist mucosa  CHEST/LUNG: Clear to auscultation bilaterally; No wheeze, equal breath sounds bilaterally   BACK: No spinal tenderness  HEART: Regular rate and rhythm; No murmurs, rubs, or gallops  ABDOMEN: Soft, Nontender, Nondistended; Bowel sounds present  EXTREMITIES:  No clubbing, cyanosis, or edema  PSYCH: Nl behavior, nl affect  NEUROLOGY: AAOx3, non-focal, cranial nerves intact  SKIN: Normal color, No rashes or lesions      Cardiovascular Diagnostic Testing:    Echo: Personally reviewed:  19  LA 4.9  LVIDd 4.7  EF 30%  Conclusions:  1. Tethered mitral valve leaflets with normal opening.  Mitral annular calcification. Moderate mitral  regurgitation.  2. Severe segmental left ventricular systolic dysfunction.  Multiple  segments appears Dyssynchronous. EF 25-30%  3. Right ventricular enlargement with decreased right  ventricular systolic function.  4. Estimated right ventricular systolic pressure equals 62  mm Hg, assuming right atrial pressure equals 12 mm Hg,  consistent with severe pulmonary hypertension.  5. Normal tricuspid valve. moderate - Severe tricuspid  regurgitation.  6. Estimated pulmonary artery systolic pressure equals 62  mm Hg, assuming right atrial pressure equals 12 mm Hg,  consistent with severe pulmonary pressures.  *** No previous Echo exam.    Imaging:    CXR: Personally reviewed    Labs: Personally reviewed                        9.5    5.7   )-----------( 227      ( 15 Jul 2019 04:02 )             29.7     07-15    138  |  95<L>  |  20  ----------------------------<  177<H>  4.0   |  30  |  0.66    Ca    7.6<L>      15 Jul 2019 04:02  Phos  2.9     07-15  Mg     2.3     07-15    TPro  5.5<L>  /  Alb  3.0<L>  /  TBili  0.7  /  DBili  x   /  AST  342<H>  /  ALT  1168<H>  /  AlkPhos  67  07-15    PT/INR - ( 15 Jul 2019 04:02 )   PT: 14.3 sec;   INR: 1.24 ratio    PTT - ( 15 Jul 2019 04:02 )  PTT:66.6 sec    CARDIAC MARKERS ( 2019 19:58 )  89 ng/L / x     / x     / 97 U/L / x     / 3.6 ng/mL  CARDIAC MARKERS ( 2019 22:27 )  x     / 0.426 ng/mL / x     / x     / x     / x      CARDIAC MARKERS ( 2019 10:42 )  x     / 0.265 ng/mL / x     / 50 U/L / x     / x      CARDIAC MARKERS ( 2019 07:31 )  x     / 0.207 ng/mL / x     / 42 U/L / x     / x      CARDIAC MARKERS ( 2019 04:17 )  x     / 0.025 ng/mL / x     / x     / x     / x          Serum Pro-Brain Natriuretic Peptide: 55614 pg/mL ( @ 19:58)  Serum Pro-Brain Natriuretic Peptide: 3185 pg/mL ( @ 04:17)    Total Cholesterol: 112  LDL: 41  HDL: 49  T    Hemoglobin A1C, Whole Blood: 5.4 % ( @ 06:48)    Thyroid Stimulating Hormone, Serum: 6.82 uIU/mL ( @ 02:06) Patient seen and evaluated at bedside    Chief Complaint: SOB    HPI:  Ms Morin is a 79 yo F with a PMH significant for AF on Xarelto, HTN who presented to [facility] with SOB, found to be overloaded and in AF with RVR. She was treated with IV diltiazem for rate control. There was concern for sepsis given leukocytosis so she was given 2L NS. TTE later revealed EF 25% so she was admitted to CCU for diuresis. In addition to diuresis, she was also loaded with amio and digoxin. While in the CCU, she was given lasix and was apparently hypotensive to the 50s so she was given another liter bolus. Due to a belief that she may need mechanical support, she was transferred to [facility] on  for further management. Of note, while at [place], she developed NORM with Cr 1.46, elevated LFTs to the 4000s. Upon arrival to [facility], she was noted to be volume overloaded and was started on IV diuretics. Since admission, she is net neg 8.5L, weight down to 88.6kg from 93.1kg.     She has been started on losartan 25mg daily and is scheduled for R/LHC today. Currently, she feels much improved.      PMHx:   Gastrointestinal hemorrhage associated with intestinal diverticulosis  Afib  CHF (congestive heart failure)  Gastroesophageal reflux disease, esophagitis presence not specified  Essential hypertension      PSHx:   S/P right knee arthroscopy  H/O partial thyroidectomy      Allergies:  No Known Allergies      Home Meds:  valsartan/HCTZ  diltiazem  xarelto    Current Medications:   buDESOnide    EC Capsule 9 milliGRAM(s) Oral daily  chlorhexidine 2% Cloths 1 Application(s) Topical at bedtime  docusate sodium 100 milliGRAM(s) Oral two times a day  famotidine    Tablet 20 milliGRAM(s) Oral at bedtime  ferrous    sulfate 325 milliGRAM(s) Oral daily  furosemide   Injectable 80 milliGRAM(s) IV Push daily  heparin  Infusion.  Unit(s)/Hr IV Continuous <Continuous>  heparin  Injectable 7500 Unit(s) IV Push every 6 hours PRN  heparin  Injectable 3500 Unit(s) IV Push every 6 hours PRN  losartan 25 milliGRAM(s) Oral daily  melatonin 3 milliGRAM(s) Oral at bedtime PRN  pantoprazole    Tablet 40 milliGRAM(s) Oral every 24 hours  senna 2 Tablet(s) Oral at bedtime  simethicone 80 milliGRAM(s) Chew every 6 hours PRN      FAMILY HISTORY:  No pertinent family history in first degree relatives      Social History:  Smoking History: denies  Alcohol Use: denies  Drug Use: denies    REVIEW OF SYSTEMS:  CONSTITUTIONAL: No weakness, fevers or chills  EYES/ENT: No visual changes;  No dysphagia  NECK: No pain or stiffness  RESPIRATORY: + SOB, No cough, wheezing, hemoptysis  CARDIOVASCULAR: No chest pain or palpitations; + lower extremity edema  GASTROINTESTINAL: No abdominal or epigastric pain. No nausea, vomiting, or hematemesis; No diarrhea or constipation. No melena or hematochezia.  BACK: No back pain  GENITOURINARY: No dysuria, frequency or hematuria  NEUROLOGICAL: No numbness or weakness  SKIN: No itching, burning, rashes, or lesions   All other review of systems is negative unless indicated above.    Physical Exam:  T(F): 97.9 (-15), Max: 98.6 (-)  HR: 114 (07-15) (86 - 118)  BP: 107/63 (-15) (72/50 - 122/70)  RR: 18 (-15)  SpO2: 98% (-15)  GENERAL: No acute distress, well-developed  HEAD:  Atraumatic, Normocephalic  ENT: EOMI, PERRLA, conjunctiva and sclera clear, Neck supple, JVD to clavicle, + HJR  CHEST/LUNG: Clear to auscultation bilaterally; No wheeze, equal breath sounds bilaterally   HEART: Regular rate and rhythm; No murmurs, rubs, or gallops  ABDOMEN: Soft, Nontender, Nondistended; Bowel sounds present  EXTREMITIES:  1+ edema  PSYCH: Nl behavior, nl affect  NEUROLOGY: AAOx3, non-focal, cranial nerves intact  SKIN: Normal color, No rashes or lesions      Cardiovascular Diagnostic Testing:    Echo: Personally reviewed:  19  LA 4.9  LVIDd 4.7  EF 30%  Conclusions:  1. Tethered mitral valve leaflets with normal opening.  Mitral annular calcification. Moderate mitral  regurgitation.  2. Severe segmental left ventricular systolic dysfunction.  Multiple  segments appears Dyssynchronous. EF 25-30%  3. Right ventricular enlargement with decreased right  ventricular systolic function.  4. Estimated right ventricular systolic pressure equals 62  mm Hg, assuming right atrial pressure equals 12 mm Hg,  consistent with severe pulmonary hypertension.  5. Normal tricuspid valve. moderate - Severe tricuspid  regurgitation.  6. Estimated pulmonary artery systolic pressure equals 62  mm Hg, assuming right atrial pressure equals 12 mm Hg,  consistent with severe pulmonary pressures.  *** No previous Echo exam.    Imaging:    CXR: Personally reviewed: small b/l pleural effusions    Labs: Personally reviewed                        9.5    5.7   )-----------( 227      ( 15 Jul 2019 04:02 )             29.7     07-15    138  |  95<L>  |  20  ----------------------------<  177<H>  4.0   |  30  |  0.66    Ca    7.6<L>      15 Jul 2019 04:02  Phos  2.9     07-15  Mg     2.3     07-15    TPro  5.5<L>  /  Alb  3.0<L>  /  TBili  0.7  /  DBili  x   /  AST  342<H>  /  ALT  1168<H>  /  AlkPhos  67  07-15    PT/INR - ( 15 Jul 2019 04:02 )   PT: 14.3 sec;   INR: 1.24 ratio    PTT - ( 15 Jul 2019 04:02 )  PTT:66.6 sec    CARDIAC MARKERS ( 2019 19:58 )  89 ng/L / x     / x     / 97 U/L / x     / 3.6 ng/mL  CARDIAC MARKERS ( 2019 22:27 )  x     / 0.426 ng/mL / x     / x     / x     / x      CARDIAC MARKERS ( 2019 10:42 )  x     / 0.265 ng/mL / x     / 50 U/L / x     / x      CARDIAC MARKERS ( 2019 07:31 )  x     / 0.207 ng/mL / x     / 42 U/L / x     / x      CARDIAC MARKERS ( 2019 04:17 )  x     / 0.025 ng/mL / x     / x     / x     / x          Serum Pro-Brain Natriuretic Peptide: 20032 pg/mL ( @ 19:58)  Serum Pro-Brain Natriuretic Peptide: 3185 pg/mL ( @ 04:17)    Total Cholesterol: 112  LDL: 41  HDL: 49  T    Hemoglobin A1C, Whole Blood: 5.4 % ( @ 06:48)    Thyroid Stimulating Hormone, Serum: 6.82 uIU/mL ( @ 02:06) Patient seen and evaluated at bedside    Chief Complaint: SOB    HPI:  Ms Morin is a 77 yo F with a PMH significant for AF on Xarelto, HTN who presented to [facility] with SOB, found to be overloaded and in AF with RVR. She was treated with IV diltiazem for rate control. There was concern for sepsis given leukocytosis so she was given 2L NS. TTE later revealed EF 25% so she was admitted to CCU for diuresis. In addition to diuresis, she was also loaded with amio and digoxin. While in the CCU, she was given lasix and was apparently hypotensive to the 50s so she was given another liter bolus. Due to a belief that she may need mechanical support, she was transferred to [facility] on  for further management. Of note, while at [place], she developed NORM with Cr 1.46, elevated LFTs to the 4000s. Upon arrival to [facility], she was noted to be volume overloaded and was started on IV diuretics. Since admission, she is net neg 8.5L, weight down to 88.6kg from 93.1kg.     She has been started on losartan 25mg daily and is scheduled for R/LHC today. Currently, she feels much improved.    Update :  LHC- normal coronaries  RHC: RA 14, RV 44/21, PA 46/20, PCW 25, LVEDP 27, PA sat 43%      PMHx:   Gastrointestinal hemorrhage associated with intestinal diverticulosis  Afib  CHF (congestive heart failure)  Gastroesophageal reflux disease, esophagitis presence not specified  Essential hypertension      PSHx:   S/P right knee arthroscopy  H/O partial thyroidectomy      Allergies:  No Known Allergies      Home Meds:  valsartan/HCTZ  diltiazem  xarelto    Current Medications:   buDESOnide    EC Capsule 9 milliGRAM(s) Oral daily  chlorhexidine 2% Cloths 1 Application(s) Topical at bedtime  docusate sodium 100 milliGRAM(s) Oral two times a day  famotidine    Tablet 20 milliGRAM(s) Oral at bedtime  ferrous    sulfate 325 milliGRAM(s) Oral daily  furosemide   Injectable 80 milliGRAM(s) IV Push daily  heparin  Infusion.  Unit(s)/Hr IV Continuous <Continuous>  heparin  Injectable 7500 Unit(s) IV Push every 6 hours PRN  heparin  Injectable 3500 Unit(s) IV Push every 6 hours PRN  losartan 25 milliGRAM(s) Oral daily  melatonin 3 milliGRAM(s) Oral at bedtime PRN  pantoprazole    Tablet 40 milliGRAM(s) Oral every 24 hours  senna 2 Tablet(s) Oral at bedtime  simethicone 80 milliGRAM(s) Chew every 6 hours PRN      FAMILY HISTORY:  No pertinent family history in first degree relatives      Social History:  Smoking History: denies  Alcohol Use: denies  Drug Use: denies    REVIEW OF SYSTEMS:  CONSTITUTIONAL: No weakness, fevers or chills  EYES/ENT: No visual changes;  No dysphagia  NECK: No pain or stiffness  RESPIRATORY: + SOB, No cough, wheezing, hemoptysis  CARDIOVASCULAR: No chest pain or palpitations; + lower extremity edema  GASTROINTESTINAL: No abdominal or epigastric pain. No nausea, vomiting, or hematemesis; No diarrhea or constipation. No melena or hematochezia.  BACK: No back pain  GENITOURINARY: No dysuria, frequency or hematuria  NEUROLOGICAL: No numbness or weakness  SKIN: No itching, burning, rashes, or lesions   All other review of systems is negative unless indicated above.    Physical Exam:  T(F): 97.9 (-15), Max: 98.6 (-14)  HR: 114 (-15) (86 - 118)  BP: 107/63 (-15) (72/50 - 122/70)  RR: 18 (-15)  SpO2: 98% (-15)  GENERAL: No acute distress, well-developed  HEAD:  Atraumatic, Normocephalic  ENT: EOMI, PERRLA, conjunctiva and sclera clear, Neck supple, JVD to clavicle, + HJR  CHEST/LUNG: Clear to auscultation bilaterally; No wheeze, equal breath sounds bilaterally   HEART: Regular rate and rhythm; No murmurs, rubs, or gallops  ABDOMEN: Soft, Nontender, Nondistended; Bowel sounds present  EXTREMITIES:  1+ edema  PSYCH: Nl behavior, nl affect  NEUROLOGY: AAOx3, non-focal, cranial nerves intact  SKIN: Normal color, No rashes or lesions      Cardiovascular Diagnostic Testing:    Echo: Personally reviewed:  19  LA 4.9  LVIDd 4.7  EF 30%  Conclusions:  1. Tethered mitral valve leaflets with normal opening.  Mitral annular calcification. Moderate mitral  regurgitation.  2. Severe segmental left ventricular systolic dysfunction.  Multiple  segments appears Dyssynchronous. EF 25-30%  3. Right ventricular enlargement with decreased right  ventricular systolic function.  4. Estimated right ventricular systolic pressure equals 62  mm Hg, assuming right atrial pressure equals 12 mm Hg,  consistent with severe pulmonary hypertension.  5. Normal tricuspid valve. moderate - Severe tricuspid  regurgitation.  6. Estimated pulmonary artery systolic pressure equals 62  mm Hg, assuming right atrial pressure equals 12 mm Hg,  consistent with severe pulmonary pressures.  *** No previous Echo exam.    Imaging:    CXR: Personally reviewed: small b/l pleural effusions    Labs: Personally reviewed                        9.5    5.7   )-----------( 227      ( 15 Jul 2019 04:02 )             29.7     07-15    138  |  95<L>  |  20  ----------------------------<  177<H>  4.0   |  30  |  0.66    Ca    7.6<L>      15 Jul 2019 04:02  Phos  2.9     07-15  Mg     2.3     07-15    TPro  5.5<L>  /  Alb  3.0<L>  /  TBili  0.7  /  DBili  x   /  AST  342<H>  /  ALT  1168<H>  /  AlkPhos  67  07-15    PT/INR - ( 15 Jul 2019 04:02 )   PT: 14.3 sec;   INR: 1.24 ratio    PTT - ( 15 Jul 2019 04:02 )  PTT:66.6 sec    CARDIAC MARKERS ( 2019 19:58 )  89 ng/L / x     / x     / 97 U/L / x     / 3.6 ng/mL  CARDIAC MARKERS ( 2019 22:27 )  x     / 0.426 ng/mL / x     / x     / x     / x      CARDIAC MARKERS ( 2019 10:42 )  x     / 0.265 ng/mL / x     / 50 U/L / x     / x      CARDIAC MARKERS ( 2019 07:31 )  x     / 0.207 ng/mL / x     / 42 U/L / x     / x      CARDIAC MARKERS ( 2019 04:17 )  x     / 0.025 ng/mL / x     / x     / x     / x          Serum Pro-Brain Natriuretic Peptide: 93604 pg/mL ( @ 19:58)  Serum Pro-Brain Natriuretic Peptide: 3185 pg/mL ( @ 04:17)    Total Cholesterol: 112  LDL: 41  HDL: 49  T    Hemoglobin A1C, Whole Blood: 5.4 % ( @ 06:48)    Thyroid Stimulating Hormone, Serum: 6.82 uIU/mL ( @ 02:06) Patient seen and evaluated at bedside    Chief Complaint: SOB    HPI:  Ms Morin is a 77 yo F with a PMH significant for AF on Xarelto, HTN who presented to [facility] with SOB, found to be overloaded and in AF with RVR. She was treated with IV diltiazem for rate control. There was concern for sepsis given leukocytosis so she was given 2L NS. TTE later revealed EF 25% so she was admitted to CCU for diuresis. In addition to diuresis, she was also loaded with amio and digoxin. While in the CCU, she was given lasix and was apparently hypotensive to the 50s so she was given another liter bolus. Due to a belief that she may need mechanical support, she was transferred to [facility] on  for further management. Of note, while at [place], she developed NORM with Cr 1.46, elevated LFTs to the 4000s. Upon arrival to [facility], she was noted to be volume overloaded and was started on IV diuretics. Since admission, she is net neg 8.5L, weight down to 88.6kg from 93.1kg.     She has been started on losartan 25mg daily and is scheduled for R/LHC today. Currently, she feels much improved.    Update :  LHC- normal coronaries  RHC: RA 14, RV 44/21, PA 46/20, PCW 25, LVEDP 10, PA sat 43%    Patient given lasix 40mg IV x 1 and placed on lasix 40mg IV daily. Is/Os net neg 1.3L overnight.      PMHx:   Gastrointestinal hemorrhage associated with intestinal diverticulosis  Afib  CHF (congestive heart failure)  Gastroesophageal reflux disease, esophagitis presence not specified  Essential hypertension      PSHx:   S/P right knee arthroscopy  H/O partial thyroidectomy      Allergies:  No Known Allergies      Home Meds:  valsartan/HCTZ  diltiazem  xarelto    Current Medications:   buDESOnide    EC Capsule 9 milliGRAM(s) Oral daily  chlorhexidine 2% Cloths 1 Application(s) Topical at bedtime  docusate sodium 100 milliGRAM(s) Oral two times a day  famotidine    Tablet 20 milliGRAM(s) Oral at bedtime  ferrous    sulfate 325 milliGRAM(s) Oral daily  furosemide   Injectable 80 milliGRAM(s) IV Push daily  heparin  Infusion.  Unit(s)/Hr IV Continuous <Continuous>  heparin  Injectable 7500 Unit(s) IV Push every 6 hours PRN  heparin  Injectable 3500 Unit(s) IV Push every 6 hours PRN  losartan 25 milliGRAM(s) Oral daily  melatonin 3 milliGRAM(s) Oral at bedtime PRN  pantoprazole    Tablet 40 milliGRAM(s) Oral every 24 hours  senna 2 Tablet(s) Oral at bedtime  simethicone 80 milliGRAM(s) Chew every 6 hours PRN      FAMILY HISTORY:  No pertinent family history in first degree relatives      Social History:  Smoking History: denies  Alcohol Use: denies  Drug Use: denies    REVIEW OF SYSTEMS:  CONSTITUTIONAL: No weakness, fevers or chills  EYES/ENT: No visual changes;  No dysphagia  NECK: No pain or stiffness  RESPIRATORY: + SOB, No cough, wheezing, hemoptysis  CARDIOVASCULAR: No chest pain or palpitations; + lower extremity edema  GASTROINTESTINAL: No abdominal or epigastric pain. No nausea, vomiting, or hematemesis; No diarrhea or constipation. No melena or hematochezia.  BACK: No back pain  GENITOURINARY: No dysuria, frequency or hematuria  NEUROLOGICAL: No numbness or weakness  SKIN: No itching, burning, rashes, or lesions   All other review of systems is negative unless indicated above.    Physical Exam:  T(F): 97.9 (-15), Max: 98.6 (-14)  HR: 114 (-15) (86 - 118)  BP: 107/63 (-15) (72/50 - 122/70)  RR: 18 (-15)  SpO2: 98% (-15)  GENERAL: No acute distress, well-developed  HEAD:  Atraumatic, Normocephalic  ENT: EOMI, PERRLA, conjunctiva and sclera clear, Neck supple, JVD to clavicle, + HJR  CHEST/LUNG: Clear to auscultation bilaterally; No wheeze, equal breath sounds bilaterally   HEART: Regular rate and rhythm; No murmurs, rubs, or gallops  ABDOMEN: Soft, Nontender, Nondistended; Bowel sounds present  EXTREMITIES:  1+ edema  PSYCH: Nl behavior, nl affect  NEUROLOGY: AAOx3, non-focal, cranial nerves intact  SKIN: Normal color, No rashes or lesions      Cardiovascular Diagnostic Testing:    Echo: Personally reviewed:  19  LA 4.9  LVIDd 4.7  EF 30%  Conclusions:  1. Tethered mitral valve leaflets with normal opening.  Mitral annular calcification. Moderate mitral  regurgitation.  2. Severe segmental left ventricular systolic dysfunction.  Multiple  segments appears Dyssynchronous. EF 25-30%  3. Right ventricular enlargement with decreased right  ventricular systolic function.  4. Estimated right ventricular systolic pressure equals 62  mm Hg, assuming right atrial pressure equals 12 mm Hg,  consistent with severe pulmonary hypertension.  5. Normal tricuspid valve. moderate - Severe tricuspid  regurgitation.  6. Estimated pulmonary artery systolic pressure equals 62  mm Hg, assuming right atrial pressure equals 12 mm Hg,  consistent with severe pulmonary pressures.  *** No previous Echo exam.    Imaging:    CXR: Personally reviewed: small b/l pleural effusions    Labs: Personally reviewed                        9.5    5.7   )-----------( 227      ( 15 Jul 2019 04:02 )             29.7     07-15    138  |  95<L>  |  20  ----------------------------<  177<H>  4.0   |  30  |  0.66    Ca    7.6<L>      15 Jul 2019 04:02  Phos  2.9     07-15  Mg     2.3     07-15    TPro  5.5<L>  /  Alb  3.0<L>  /  TBili  0.7  /  DBili  x   /  AST  342<H>  /  ALT  1168<H>  /  AlkPhos  67  07-15    PT/INR - ( 15 Jul 2019 04:02 )   PT: 14.3 sec;   INR: 1.24 ratio    PTT - ( 15 Jul 2019 04:02 )  PTT:66.6 sec    CARDIAC MARKERS ( 2019 19:58 )  89 ng/L / x     / x     / 97 U/L / x     / 3.6 ng/mL  CARDIAC MARKERS ( 2019 22:27 )  x     / 0.426 ng/mL / x     / x     / x     / x      CARDIAC MARKERS ( 2019 10:42 )  x     / 0.265 ng/mL / x     / 50 U/L / x     / x      CARDIAC MARKERS ( 2019 07:31 )  x     / 0.207 ng/mL / x     / 42 U/L / x     / x      CARDIAC MARKERS ( 2019 04:17 )  x     / 0.025 ng/mL / x     / x     / x     / x          Serum Pro-Brain Natriuretic Peptide: 27841 pg/mL ( @ 19:58)  Serum Pro-Brain Natriuretic Peptide: 3185 pg/mL ( @ 04:17)    Total Cholesterol: 112  LDL: 41  HDL: 49  T    Hemoglobin A1C, Whole Blood: 5.4 % ( @ 06:48)    Thyroid Stimulating Hormone, Serum: 6.82 uIU/mL ( @ 02:06)

## 2019-07-15 NOTE — PROGRESS NOTE ADULT - ASSESSMENT
78F with hx Afib on xarelto, HTN, GERD who presented to OSH for SOB, transferred to [facility] for newly diagnosed systolic HF.     Neuro:  -no active issues    Cardiovascular:    1. Acute decompensated systolic heart failure  -TTE from OSH showed EF: 20-25%, mod MR, mod TR, mod diffuse hypokinesis of LV.  She had an echo done in 2017 which showed EF 55-60%. Unclear etiology ischemic vs non-ischemic  -BNP 16464  -c/w lasix 80mg IVP qd and PRN if U/O declines  -escamilla removed yesterday  -holding BB in setting of ADHF  -losartan 25mg  -cardiac cath scheduled for today to evaluate for ischemic causes of ADHF     2. Afib   -CHADVASC: 5  -holding rivoraxaban, c/w heparin ggt  -holding BB in setting of ADHF  -on tele, may be afib with aberrancy; f/u digoxin level     Pulmonary:    -currently on 2L NC, not requiring supplementation d/c NC prn  -continue with diuresis as above  -continue to monitor    GI:    1. Congestive hepatopathy  -LFTs peaked at AST/ALT  4473/2724, now at 342/1168  -currently trending down, will continue to monitor     2. Nausea  -c/o of nausea, QTC is 531 most likely in the setting of afib with aberrancy   -monitor QTC and zofran as needed  -now resolved    3. Gallbladder thickening  -at OSH, CT abd/pelvis which showed gallbladder wall thickening and colonic diverticulossi without diverticulitis.  Abd US was done which showed cholithiasis and severe diffuse gallbladder wall thickening.  Surgery was consulted and recommended HIDA scan but no surgical intervention. Patient not actively having symptoms; will hold off on HIDA scan.    Renal:  1. NORM  -most likely in the 2/2 of low flow state  -continue to monitor Cr  -currently normalized    ID:  -at OSH, was on vanc/cefepime for ?HAP  -blood cx 7/11: NGTD  -will continue to monitor off abx  -normalized    Heme:  -chronic anemia from iron-deficiency anemia  -H/H 9.5/29.7  -c/w ferrous sulfate   -Will continue to monitor 78F with hx Afib on xarelto, HTN, GERD who presented to OSH for SOB, transferred to [facility] for newly diagnosed systolic HF.     Neuro:  -no active issues    Cardiovascular:    1. Acute decompensated systolic heart failure  -TTE from OSH showed EF: 20-25%, mod MR, mod TR, mod diffuse hypokinesis of LV.  She had an echo done in 2017 which showed EF 55-60%. Unclear etiology ischemic vs non-ischemic  -BNP 01899  -c/w lasix 80mg IVP qd and PRN if U/O declines  -escamilla removed yesterday  -holding BB in setting of ADHF  -losartan 25mg  -cardiac cath scheduled for today to evaluate for ischemic causes of ADHF     2. Afib   -CHADVASC: 5  -holding rivoraxaban, c/w heparin ggt  -holding BB in setting of ADHF  -on tele, may be afib with aberrancy; f/u digoxin level     Pulmonary:    -currently on 2L NC, not requiring supplementation d/c NC prn  -continue with diuresis as above  -continue to monitor    GI:    1. Congestive hepatopathy  -LFTs peaked at AST/ALT  4473/2724, now at 342/1168  -currently trending down, will continue to monitor     2. Nausea  -c/o of nausea, QTC is 531 most likely in the setting of afib with aberrancy   -monitor QTC and zofran as needed  -now resolved    3. Gallbladder thickening  -at OSH, CT abd/pelvis which showed gallbladder wall thickening and colonic diverticulossi without diverticulitis.  Abd US was done which showed cholithiasis and severe diffuse gallbladder wall thickening.  Surgery was consulted and recommended HIDA scan but no surgical intervention. Patient not actively having symptoms; will hold off on HIDA scan.    Renal:  1. NORM  -most likely in the 2/2 of low flow state  -continue to monitor Cr  -currently normalized    ID:  -at OSH, was on vanc/cefepime for ?HAP  -blood cx 7/11: NGTD  -will continue to monitor off abx  -normalized    Heme:  -chronic anemia from iron-deficiency anemia  -H/H 9.5/29.7  -c/w ferrous sulfate   -Will continue to monitor 78F with hx Afib on xarelto, HTN, GERD who presented to OSH for SOB, transferred to [facility] for newly diagnosed systolic HF.     Neuro:  -no active issues    Cardiovascular:    1. Acute decompensated systolic heart failure  -TTE from OSH showed EF: 20-25%, mod MR, mod TR, mod diffuse hypokinesis of LV.  She had an echo done in 2017 which showed EF 55-60%. Unclear etiology ischemic vs non-ischemic  -c/w lasix 80mg IVP qd and PRN if U/O declines  -holding BB in setting of ADHF  -losartan 25mg  -cardiac cath scheduled for today to evaluate for ischemic causes of ADHF     2. Afib   -CHADVASC: 5  -holding rivoraxaban, c/w heparin ggt for cardiac cath today   -holding BB in setting of ADHF  -currently in afib w/ HR < 120 not on rate or rhythm control       Pulmonary:  1. Hypoxic respiratory failure  - 2/2 acute decompensated heart failure  - RESOLVED w/ diuresis  - continue to monitor pulse ox  -cxr done today, compared to last one  - c/w supplemental O2 as needed to maintain SpO2 > 92%       GI:    1. Congestive hepatopathy  -LFTs peaked at AST/ALT  4473/2724, now at 342/1168  -currently trending down, will continue to monitor   - RUQ US performed; showed cholithiasis and severe diffuse gallbladder wall thickening. Surgery was consulted and recommended HIDA scan but no surgical intervention. Patient not actively having symptoms; will hold off on HIDA scan.    2. GERD  - c/w AM protonix and Famotidine qhs    Renal:  1. NORM  -most likely in the 2/2 of low flow state  -continue to monitor Cr  -currently normalized    ID:  -at OSH, was on vanc/cefepime for ?HAP  -blood cx 7/11: NGTD  -will continue to monitor off abx  -normalized    Heme:  -chronic anemia from iron-deficiency anemia  -H/H 9.5/29.7  -c/w ferrous sulfate   -Will continue to monitor    Prophylactic measure:  DVT ppx: heparin drip   Diet: 78F with hx Afib on xarelto, HTN, GERD who presented to OSH for SOB, transferred to [facility] for newly diagnosed systolic HF.     Neuro:  -no active issues    Cardiovascular:    1. Acute decompensated systolic heart failure  -TTE from OSH showed EF: 20-25%, mod MR, mod TR, mod diffuse hypokinesis of LV.  She had an echo done in 2017 which showed EF 55-60%. Unclear etiology ischemic vs non-ischemic  -c/w lasix 40mg IVP qd and PRN if U/O declines  -holding BB in setting of ADHF  -losartan 25mg  -cardiac cath scheduled for today to evaluate for ischemic causes of ADHF     2. Afib   -CHADVASC: 5  -holding rivoraxaban, c/w heparin ggt for cardiac cath today   -holding BB in setting of ADHF  -currently in afib w/ HR < 120 not on rate or rhythm control       Pulmonary:  1. Hypoxic respiratory failure  - 2/2 acute decompensated heart failure  - RESOLVED w/ diuresis  - continue to monitor pulse ox  -cxr done today, compared to last one  - c/w supplemental O2 as needed to maintain SpO2 > 92%       GI:    1. Congestive hepatopathy  -LFTs peaked at AST/ALT  4473/2724, now at 342/1168  -currently trending down, will continue to monitor   - RUQ US performed; showed cholithiasis and severe diffuse gallbladder wall thickening. Surgery was consulted and recommended HIDA scan but no surgical intervention. Patient not actively having symptoms; will hold off on HIDA scan.    2. GERD  - c/w AM protonix and Famotidine qhs    Renal:  1. NORM  -most likely in the 2/2 of low flow state  -continue to monitor Cr  -currently normalized    ID:  -at OSH, was on vanc/cefepime for ?HAP  -blood cx 7/11: NGTD  -will continue to monitor off abx  -normalized    Heme:  -chronic anemia from iron-deficiency anemia  -H/H 9.5/29.7  -c/w ferrous sulfate   -Will continue to monitor    Prophylactic measure:  DVT ppx: heparin drip

## 2019-07-15 NOTE — CHART NOTE - NSCHARTNOTEFT_GEN_A_CORE
====================  CCU MIDNIGHT ROUNDS  ====================    FRANCE ROBB  [id number]    ====================  SUMMARY: HPI:  78 year old woman with Afib on Xarelto, HTN presented to OSH for SOB now transferred to [facility] for newly diagnosed acute decompensated systolic heart failure.      At the OSH, patient was found to be in afib with RVR and was given IV cardizem 10mg.  Patient was also loaded with digoxin.  Patient's lactate was 9.8 and received 2L IVF bolus.  Patient had TTE done which showed EF 20-25%, mod MR, mod TR, mod diffuse hypokinesis of LV.  She had an echo done in 2017 which showed EF 55-60%. Patient was given a few doses of IV lasix 40mg (last dose 7/11 at 2PM) but was hypotensive at 7/12 at 2 AM to 75/54 and was given 1L bolus then also and SBP in the 90s.  Patient did not get diuresis afterwards.  Patient was never placed on pressor support.  Patient was also started on amio load. Patient presented with leukocytosis of 16-17 but was afebrile.  There was concern for HAP and patient was started on vanc/cefepime.  Blood cx from 07/11 were negative. Patient had a CT angio which was negative for PE but b/l pleural effusions and CT abd/pelvis which showed gallbladder wall thickening and colonic diverticulossi without diverticulitis.  Abd US was done which showed cholithiasis and severe diffuse gallbladder wall thickening.  Surgery was consulted and recommended HIDA scan but no surgical intervention.  Over the course of the hospital, patient developed an NORM (1.41 from 0.85 on admission) and shock liver (AST 4473, ALT 2724, from normal levels.) Lactate trended down to 3.1. Patient transferred to [facility] for possible candidate for invasive cardiac transport.     At [facility], patient in fluid overload state and currently on 2L NC.  History is significant for progressive SOB since 01/19.  Patient would take a few steps and become SOB.  Patient does see a cardiologist Dr. Cerda as outpatient and started her on furosemide 40mg three times a week. Per family, patient's other outpatient medications included valsartan, HCTZ, cardizem but patient does not have a list of her medications. Patient did not receive an echo since 2017 and has never had a LHC/RHC.  Currently, patient endorses nausea and acid reflux and bloating.  Patient also complains of cough.  Has mild SOB but denies any chest pain, lightheadedness, dizziness. (12 Jul 2019 18:58)    ====================        ====================  NEW EVENTS:  s/p R/LHC revealing clear cors, RA 14, RV 44/21, PA 46/20, PA sat 43%, PCWP 25, LVEDP 27, CO/CI 3.89/2.04.  Venous and arterial sheaths removed.   Lasix 40mg IVP now.   Will start amio load for Afib.  ====================        ====================  VITALS (Last 12 hrs):  ====================    T(C): 36.7 (07-15-19 @ 20:30), Max: 37.1 (07-15-19 @ 11:00)  HR: 114 (07-15-19 @ 21:30) (96 - 116)  BP: 113/65 (07-15-19 @ 21:30) (90/67 - 124/75)  RR: 26 (07-15-19 @ 21:30) (17 - 29)  SpO2: 98% (07-15-19 @ 21:30) (96% - 100%)      TELEMETRY: afib        I&O's Summary    14 Jul 2019 07:01  -  15 Jul 2019 07:00  --------------------------------------------------------  IN: 858 mL / OUT: 2600 mL / NET: -1742 mL    15 Jul 2019 07:01  -  15 Jul 2019 22:03  --------------------------------------------------------  IN: 677 mL / OUT: 1275 mL / NET: -598 mL        ====================  PLAN:  # New Acute HFrEF  - TTE showing EF: 20-25%, mod MR, mod TR, mod diffuse hypokinesis of LV.  - s/p R/LHC revealing clear cors, RA 14, RV 44/21, PA 46/20, PA sat 43%, PCWP 25, LVEDP 27, CO/CI 3.89/2.04.  - c/w lasix 80mg IVP qd and PRN if U/O declines  - strict I/Os, lytes, daily weights  - hold BB in decompensated state  - c/w losartan  - congestive hepatopathy improving; monitor closely now that we are starting amio    # Afib   - CHADVASC: 5  - holding rivoraxaban, restart heparin gtt 230 AM  - holding BB in setting of ADHF  - amio load to begin today; monitor LFTs closely  ====================    HEALTH ISSUES - PROBLEM Dx:  NORM (acute kidney injury): NORM (acute kidney injury)  Acute systolic (congestive) heart failure: Acute systolic (congestive) heart failure        Meme Luque, CCU PA #[phone]

## 2019-07-16 DIAGNOSIS — I48.91 UNSPECIFIED ATRIAL FIBRILLATION: ICD-10-CM

## 2019-07-16 DIAGNOSIS — I10 ESSENTIAL (PRIMARY) HYPERTENSION: ICD-10-CM

## 2019-07-16 LAB
ALBUMIN SERPL ELPH-MCNC: 3 G/DL — LOW (ref 3.3–5)
ALBUMIN SERPL ELPH-MCNC: 3.2 G/DL — LOW (ref 3.3–5)
ALBUMIN SERPL ELPH-MCNC: 3.3 G/DL — SIGNIFICANT CHANGE UP (ref 3.3–5)
ALP SERPL-CCNC: 58 U/L — SIGNIFICANT CHANGE UP (ref 40–120)
ALP SERPL-CCNC: 65 U/L — SIGNIFICANT CHANGE UP (ref 40–120)
ALP SERPL-CCNC: 67 U/L — SIGNIFICANT CHANGE UP (ref 40–120)
ALT FLD-CCNC: 731 U/L — HIGH (ref 10–45)
ALT FLD-CCNC: 774 U/L — HIGH (ref 10–45)
ALT FLD-CCNC: 822 U/L — HIGH (ref 10–45)
ANION GAP SERPL CALC-SCNC: 13 MMOL/L — SIGNIFICANT CHANGE UP (ref 5–17)
ANION GAP SERPL CALC-SCNC: 14 MMOL/L — SIGNIFICANT CHANGE UP (ref 5–17)
ANION GAP SERPL CALC-SCNC: 8 MMOL/L — SIGNIFICANT CHANGE UP (ref 5–17)
APTT BLD: 48.7 SEC — HIGH (ref 27.5–36.3)
APTT BLD: 56 SEC — HIGH (ref 27.5–36.3)
APTT BLD: 99.5 SEC — HIGH (ref 27.5–36.3)
AST SERPL-CCNC: 110 U/L — HIGH (ref 10–40)
AST SERPL-CCNC: 159 U/L — HIGH (ref 10–40)
AST SERPL-CCNC: 256 U/L — HIGH (ref 10–40)
BASOPHILS # BLD AUTO: 0 K/UL — SIGNIFICANT CHANGE UP (ref 0–0.2)
BASOPHILS NFR BLD AUTO: 0.4 % — SIGNIFICANT CHANGE UP (ref 0–2)
BILIRUB SERPL-MCNC: 0.5 MG/DL — SIGNIFICANT CHANGE UP (ref 0.2–1.2)
BILIRUB SERPL-MCNC: 0.6 MG/DL — SIGNIFICANT CHANGE UP (ref 0.2–1.2)
BILIRUB SERPL-MCNC: 0.6 MG/DL — SIGNIFICANT CHANGE UP (ref 0.2–1.2)
BUN SERPL-MCNC: 25 MG/DL — HIGH (ref 7–23)
BUN SERPL-MCNC: 25 MG/DL — HIGH (ref 7–23)
BUN SERPL-MCNC: 26 MG/DL — HIGH (ref 7–23)
CALCIUM SERPL-MCNC: 7.6 MG/DL — LOW (ref 8.4–10.5)
CALCIUM SERPL-MCNC: 7.8 MG/DL — LOW (ref 8.4–10.5)
CALCIUM SERPL-MCNC: 7.8 MG/DL — LOW (ref 8.4–10.5)
CHLORIDE SERPL-SCNC: 93 MMOL/L — LOW (ref 96–108)
CHLORIDE SERPL-SCNC: 94 MMOL/L — LOW (ref 96–108)
CHLORIDE SERPL-SCNC: 95 MMOL/L — LOW (ref 96–108)
CO2 SERPL-SCNC: 28 MMOL/L — SIGNIFICANT CHANGE UP (ref 22–31)
CO2 SERPL-SCNC: 29 MMOL/L — SIGNIFICANT CHANGE UP (ref 22–31)
CO2 SERPL-SCNC: 31 MMOL/L — SIGNIFICANT CHANGE UP (ref 22–31)
CREAT SERPL-MCNC: 0.61 MG/DL — SIGNIFICANT CHANGE UP (ref 0.5–1.3)
CREAT SERPL-MCNC: 0.65 MG/DL — SIGNIFICANT CHANGE UP (ref 0.5–1.3)
CREAT SERPL-MCNC: 0.76 MG/DL — SIGNIFICANT CHANGE UP (ref 0.5–1.3)
CULTURE RESULTS: SIGNIFICANT CHANGE UP
CULTURE RESULTS: SIGNIFICANT CHANGE UP
EOSINOPHIL # BLD AUTO: 0.2 K/UL — SIGNIFICANT CHANGE UP (ref 0–0.5)
EOSINOPHIL NFR BLD AUTO: 3.1 % — SIGNIFICANT CHANGE UP (ref 0–6)
GLUCOSE BLDC GLUCOMTR-MCNC: 223 MG/DL — HIGH (ref 70–99)
GLUCOSE SERPL-MCNC: 128 MG/DL — HIGH (ref 70–99)
GLUCOSE SERPL-MCNC: 150 MG/DL — HIGH (ref 70–99)
GLUCOSE SERPL-MCNC: 164 MG/DL — HIGH (ref 70–99)
HCT VFR BLD CALC: 30.6 % — LOW (ref 34.5–45)
HGB BLD-MCNC: 9.7 G/DL — LOW (ref 11.5–15.5)
KAPPA LC SER QL IFE: 2 MG/DL — HIGH (ref 0.33–1.94)
KAPPA/LAMBDA FREE LIGHT CHAIN RATIO, SERUM: 1.5 RATIO — SIGNIFICANT CHANGE UP (ref 0.26–1.65)
LAMBDA LC SER QL IFE: 1.33 MG/DL — SIGNIFICANT CHANGE UP (ref 0.57–2.63)
LYMPHOCYTES # BLD AUTO: 1.1 K/UL — SIGNIFICANT CHANGE UP (ref 1–3.3)
LYMPHOCYTES # BLD AUTO: 16.1 % — SIGNIFICANT CHANGE UP (ref 13–44)
MAGNESIUM SERPL-MCNC: 1.9 MG/DL — SIGNIFICANT CHANGE UP (ref 1.6–2.6)
MAGNESIUM SERPL-MCNC: 2.1 MG/DL — SIGNIFICANT CHANGE UP (ref 1.6–2.6)
MAGNESIUM SERPL-MCNC: 2.1 MG/DL — SIGNIFICANT CHANGE UP (ref 1.6–2.6)
MCHC RBC-ENTMCNC: 25.9 PG — LOW (ref 27–34)
MCHC RBC-ENTMCNC: 31.6 GM/DL — LOW (ref 32–36)
MCV RBC AUTO: 82 FL — SIGNIFICANT CHANGE UP (ref 80–100)
MONOCYTES # BLD AUTO: 1.2 K/UL — HIGH (ref 0–0.9)
MONOCYTES NFR BLD AUTO: 16.6 % — HIGH (ref 2–14)
NEUTROPHILS # BLD AUTO: 4.5 K/UL — SIGNIFICANT CHANGE UP (ref 1.8–7.4)
NEUTROPHILS NFR BLD AUTO: 63.8 % — SIGNIFICANT CHANGE UP (ref 43–77)
PHOSPHATE SERPL-MCNC: 2.8 MG/DL — SIGNIFICANT CHANGE UP (ref 2.5–4.5)
PHOSPHATE SERPL-MCNC: 3.1 MG/DL — SIGNIFICANT CHANGE UP (ref 2.5–4.5)
PHOSPHATE SERPL-MCNC: 3.5 MG/DL — SIGNIFICANT CHANGE UP (ref 2.5–4.5)
PLATELET # BLD AUTO: 234 K/UL — SIGNIFICANT CHANGE UP (ref 150–400)
POTASSIUM SERPL-MCNC: 3.9 MMOL/L — SIGNIFICANT CHANGE UP (ref 3.5–5.3)
POTASSIUM SERPL-MCNC: 4.2 MMOL/L — SIGNIFICANT CHANGE UP (ref 3.5–5.3)
POTASSIUM SERPL-MCNC: >9 MMOL/L — CRITICAL HIGH (ref 3.5–5.3)
POTASSIUM SERPL-SCNC: 3.9 MMOL/L — SIGNIFICANT CHANGE UP (ref 3.5–5.3)
POTASSIUM SERPL-SCNC: 4.2 MMOL/L — SIGNIFICANT CHANGE UP (ref 3.5–5.3)
POTASSIUM SERPL-SCNC: >9 MMOL/L — CRITICAL HIGH (ref 3.5–5.3)
PROT SERPL-MCNC: 5.8 G/DL — LOW (ref 6–8.3)
PROT SERPL-MCNC: 6 G/DL — SIGNIFICANT CHANGE UP (ref 6–8.3)
PROT SERPL-MCNC: 6.6 G/DL — SIGNIFICANT CHANGE UP (ref 6–8.3)
RBC # BLD: 3.73 M/UL — LOW (ref 3.8–5.2)
RBC # FLD: 17.2 % — HIGH (ref 10.3–14.5)
SODIUM SERPL-SCNC: 130 MMOL/L — LOW (ref 135–145)
SODIUM SERPL-SCNC: 136 MMOL/L — SIGNIFICANT CHANGE UP (ref 135–145)
SODIUM SERPL-SCNC: 139 MMOL/L — SIGNIFICANT CHANGE UP (ref 135–145)
SPECIMEN SOURCE: SIGNIFICANT CHANGE UP
SPECIMEN SOURCE: SIGNIFICANT CHANGE UP
WBC # BLD: 7.1 K/UL — SIGNIFICANT CHANGE UP (ref 3.8–10.5)
WBC # FLD AUTO: 7.1 K/UL — SIGNIFICANT CHANGE UP (ref 3.8–10.5)

## 2019-07-16 PROCEDURE — 93010 ELECTROCARDIOGRAM REPORT: CPT

## 2019-07-16 PROCEDURE — 99223 1ST HOSP IP/OBS HIGH 75: CPT | Mod: GC

## 2019-07-16 PROCEDURE — 93010 ELECTROCARDIOGRAM REPORT: CPT | Mod: 77

## 2019-07-16 PROCEDURE — 99233 SBSQ HOSP IP/OBS HIGH 50: CPT | Mod: GC

## 2019-07-16 RX ORDER — INSULIN LISPRO 100/ML
VIAL (ML) SUBCUTANEOUS AT BEDTIME
Refills: 0 | Status: DISCONTINUED | OUTPATIENT
Start: 2019-07-16 | End: 2019-07-19

## 2019-07-16 RX ORDER — DEXTROSE 50 % IN WATER 50 %
15 SYRINGE (ML) INTRAVENOUS ONCE
Refills: 0 | Status: DISCONTINUED | OUTPATIENT
Start: 2019-07-16 | End: 2019-07-19

## 2019-07-16 RX ORDER — SODIUM CHLORIDE 9 MG/ML
1000 INJECTION, SOLUTION INTRAVENOUS
Refills: 0 | Status: DISCONTINUED | OUTPATIENT
Start: 2019-07-16 | End: 2019-07-19

## 2019-07-16 RX ORDER — INSULIN LISPRO 100/ML
VIAL (ML) SUBCUTANEOUS
Refills: 0 | Status: DISCONTINUED | OUTPATIENT
Start: 2019-07-16 | End: 2019-07-19

## 2019-07-16 RX ORDER — DEXTROSE 50 % IN WATER 50 %
25 SYRINGE (ML) INTRAVENOUS ONCE
Refills: 0 | Status: DISCONTINUED | OUTPATIENT
Start: 2019-07-16 | End: 2019-07-19

## 2019-07-16 RX ORDER — FUROSEMIDE 40 MG
40 TABLET ORAL DAILY
Refills: 0 | Status: DISCONTINUED | OUTPATIENT
Start: 2019-07-17 | End: 2019-07-19

## 2019-07-16 RX ORDER — FUROSEMIDE 40 MG
40 TABLET ORAL EVERY 12 HOURS
Refills: 0 | Status: DISCONTINUED | OUTPATIENT
Start: 2019-07-16 | End: 2019-07-16

## 2019-07-16 RX ORDER — MAGNESIUM SULFATE 500 MG/ML
2 VIAL (ML) INJECTION ONCE
Refills: 0 | Status: COMPLETED | OUTPATIENT
Start: 2019-07-16 | End: 2019-07-16

## 2019-07-16 RX ORDER — POTASSIUM CHLORIDE 20 MEQ
20 PACKET (EA) ORAL ONCE
Refills: 0 | Status: COMPLETED | OUTPATIENT
Start: 2019-07-16 | End: 2019-07-16

## 2019-07-16 RX ORDER — GLUCAGON INJECTION, SOLUTION 0.5 MG/.1ML
1 INJECTION, SOLUTION SUBCUTANEOUS ONCE
Refills: 0 | Status: DISCONTINUED | OUTPATIENT
Start: 2019-07-16 | End: 2019-07-19

## 2019-07-16 RX ORDER — LOSARTAN POTASSIUM 100 MG/1
25 TABLET, FILM COATED ORAL
Refills: 0 | Status: DISCONTINUED | OUTPATIENT
Start: 2019-07-16 | End: 2019-07-17

## 2019-07-16 RX ORDER — DEXTROSE 50 % IN WATER 50 %
12.5 SYRINGE (ML) INTRAVENOUS ONCE
Refills: 0 | Status: DISCONTINUED | OUTPATIENT
Start: 2019-07-16 | End: 2019-07-19

## 2019-07-16 RX ORDER — POLYETHYLENE GLYCOL 3350 17 G/17G
17 POWDER, FOR SOLUTION ORAL ONCE
Refills: 0 | Status: COMPLETED | OUTPATIENT
Start: 2019-07-16 | End: 2019-07-16

## 2019-07-16 RX ORDER — SPIRONOLACTONE 25 MG/1
25 TABLET, FILM COATED ORAL DAILY
Refills: 0 | Status: DISCONTINUED | OUTPATIENT
Start: 2019-07-16 | End: 2019-07-19

## 2019-07-16 RX ADMIN — HEPARIN SODIUM 1900 UNIT(S)/HR: 5000 INJECTION INTRAVENOUS; SUBCUTANEOUS at 22:55

## 2019-07-16 RX ADMIN — Medication 9 MILLIGRAM(S): at 05:38

## 2019-07-16 RX ADMIN — FAMOTIDINE 20 MILLIGRAM(S): 10 INJECTION INTRAVENOUS at 21:05

## 2019-07-16 RX ADMIN — HEPARIN SODIUM 3500 UNIT(S): 5000 INJECTION INTRAVENOUS; SUBCUTANEOUS at 17:45

## 2019-07-16 RX ADMIN — LOSARTAN POTASSIUM 25 MILLIGRAM(S): 100 TABLET, FILM COATED ORAL at 17:44

## 2019-07-16 RX ADMIN — Medication 100 MILLIGRAM(S): at 05:39

## 2019-07-16 RX ADMIN — POLYETHYLENE GLYCOL 3350 17 GRAM(S): 17 POWDER, FOR SOLUTION ORAL at 06:23

## 2019-07-16 RX ADMIN — HEPARIN SODIUM 1900 UNIT(S)/HR: 5000 INJECTION INTRAVENOUS; SUBCUTANEOUS at 08:50

## 2019-07-16 RX ADMIN — SPIRONOLACTONE 25 MILLIGRAM(S): 25 TABLET, FILM COATED ORAL at 11:11

## 2019-07-16 RX ADMIN — Medication 325 MILLIGRAM(S): at 11:11

## 2019-07-16 RX ADMIN — LOSARTAN POTASSIUM 25 MILLIGRAM(S): 100 TABLET, FILM COATED ORAL at 05:39

## 2019-07-16 RX ADMIN — HEPARIN SODIUM 3500 UNIT(S): 5000 INJECTION INTRAVENOUS; SUBCUTANEOUS at 09:14

## 2019-07-16 RX ADMIN — Medication 50 GRAM(S): at 17:44

## 2019-07-16 RX ADMIN — AMIODARONE HYDROCHLORIDE 400 MILLIGRAM(S): 400 TABLET ORAL at 13:57

## 2019-07-16 RX ADMIN — SENNA PLUS 2 TABLET(S): 8.6 TABLET ORAL at 21:05

## 2019-07-16 RX ADMIN — AMIODARONE HYDROCHLORIDE 400 MILLIGRAM(S): 400 TABLET ORAL at 21:05

## 2019-07-16 RX ADMIN — Medication 3 MILLIGRAM(S): at 00:14

## 2019-07-16 RX ADMIN — PANTOPRAZOLE SODIUM 40 MILLIGRAM(S): 20 TABLET, DELAYED RELEASE ORAL at 05:39

## 2019-07-16 RX ADMIN — Medication 100 MILLIGRAM(S): at 05:38

## 2019-07-16 RX ADMIN — HEPARIN SODIUM 1700 UNIT(S)/HR: 5000 INJECTION INTRAVENOUS; SUBCUTANEOUS at 02:31

## 2019-07-16 RX ADMIN — CHLORHEXIDINE GLUCONATE 1 APPLICATION(S): 213 SOLUTION TOPICAL at 05:40

## 2019-07-16 RX ADMIN — AMIODARONE HYDROCHLORIDE 400 MILLIGRAM(S): 400 TABLET ORAL at 05:39

## 2019-07-16 RX ADMIN — Medication 40 MILLIGRAM(S): at 05:39

## 2019-07-16 RX ADMIN — Medication 100 MILLIGRAM(S): at 17:44

## 2019-07-16 RX ADMIN — HEPARIN SODIUM 2100 UNIT(S)/HR: 5000 INJECTION INTRAVENOUS; SUBCUTANEOUS at 16:46

## 2019-07-16 RX ADMIN — Medication 20 MILLIEQUIVALENT(S): at 17:44

## 2019-07-16 NOTE — PHYSICAL THERAPY INITIAL EVALUATION ADULT - GAIT DEVIATIONS NOTED, PT EVAL
decreased velocity of limb motion/decreased lourdes/decreased weight-shifting ability/increased time in double stance

## 2019-07-16 NOTE — PROGRESS NOTE ADULT - SUBJECTIVE AND OBJECTIVE BOX
====================  CCU MIDNIGHT ROUNDS  ====================    FRANCE ROBB  [id number]    Patient is a 78y old  Female who presents with a chief complaint of Systolic HF (16 Jul 2019 07:22)    ====================  SUMMARY:  ====================    ====================  NEW EVENTS:  ====================    MEDICATIONS  (STANDING):  amiodarone    Tablet 400 milliGRAM(s) Oral every 8 hours  amiodarone    Tablet   Oral   buDESOnide    EC Capsule 9 milliGRAM(s) Oral daily  chlorhexidine 2% Cloths 1 Application(s) Topical at bedtime  dextrose 5%. 1000 milliLiter(s) (50 mL/Hr) IV Continuous <Continuous>  dextrose 50% Injectable 12.5 Gram(s) IV Push once  dextrose 50% Injectable 25 Gram(s) IV Push once  dextrose 50% Injectable 25 Gram(s) IV Push once  docusate sodium 100 milliGRAM(s) Oral two times a day  famotidine    Tablet 20 milliGRAM(s) Oral at bedtime  ferrous    sulfate 325 milliGRAM(s) Oral daily  heparin  Infusion.  Unit(s)/Hr (17 mL/Hr) IV Continuous <Continuous>  insulin lispro (HumaLOG) corrective regimen sliding scale   SubCutaneous three times a day before meals  insulin lispro (HumaLOG) corrective regimen sliding scale   SubCutaneous at bedtime  losartan 25 milliGRAM(s) Oral two times a day  pantoprazole    Tablet 40 milliGRAM(s) Oral every 24 hours  senna 2 Tablet(s) Oral at bedtime  spironolactone 25 milliGRAM(s) Oral daily    MEDICATIONS  (PRN):  benzonatate 100 milliGRAM(s) Oral three times a day PRN Cough  dextrose 40% Gel 15 Gram(s) Oral once PRN Blood Glucose LESS THAN 70 milliGRAM(s)/deciliter  glucagon  Injectable 1 milliGRAM(s) IntraMuscular once PRN Glucose LESS THAN 70 milligrams/deciliter  guaiFENesin    Syrup 100 milliGRAM(s) Oral every 6 hours PRN Cough  heparin  Injectable 7500 Unit(s) IV Push every 6 hours PRN For aPTT less than 40  heparin  Injectable 3500 Unit(s) IV Push every 6 hours PRN For aPTT between 40 - 57  melatonin 3 milliGRAM(s) Oral at bedtime PRN Insomnia  simethicone 80 milliGRAM(s) Chew every 6 hours PRN Indigestion    ====================  VITALS (Last 12 hrs):  ====================  T(C): 36.4 (07-16-19 @ 19:00), Max: 36.9 (07-16-19 @ 15:00)  T(F): 97.6 (07-16-19 @ 19:00), Max: 98.4 (07-16-19 @ 15:00)  HR: 94 (07-16-19 @ 19:00) (86 - 114)  BP: 119/61 (07-16-19 @ 19:00) (94/66 - 119/61)  BP(mean): 80 (07-16-19 @ 19:00) (63 - 89)  RR: 23 (07-16-19 @ 19:00) (16 - 23)  SpO2: 98% (07-16-19 @ 19:00) (93% - 100%)      I&O's Summary    15 Jul 2019 07:01  -  16 Jul 2019 07:00  --------------------------------------------------------  IN: 1170.5 mL / OUT: 2950 mL / NET: -1779.5 mL    16 Jul 2019 07:01  -  16 Jul 2019 19:57  --------------------------------------------------------  IN: 980 mL / OUT: 1300 mL / NET: -320 mL        ====================  NEW LABS:  ====================  07-16    136  |  95<L>  |  25<H>  ----------------------------<  128<H>  3.9   |  28  |  0.65    Ca    7.8<L>      16 Jul 2019 15:45  Phos  2.8     07-16  Mg     1.9     07-16    TPro  6.0  /  Alb  3.3  /  TBili  0.5  /  DBili  x   /  AST  110<H>  /  ALT  731<H>  /  AlkPhos  67  07-16    PT/INR - ( 15 Jul 2019 04:02 )   PT: 14.3 sec;   INR: 1.24 ratio       PTT - ( 16 Jul 2019 14:18 )  PTT:56.0 sec    ====================  PLAN:  ====================      Raquel Gregorio U NP  Beeper #[id number]  Spectra # [phone] ====================  CCU MIDNIGHT ROUNDS  ====================    FRANCE ROBB  [id number]    Patient is a 78y old  Female who presents with a chief complaint of Systolic HF (16 Jul 2019 07:22)    ====================  SUMMARY: 78F with hx Afib on xarelto, HTN, GERD who presented to OSH for SOB, transferred to [facility] for newly diagnosed systolic HF.  ====================    ====================  NEW EVENTS: Lasix IV changed to oral Lasix 40mg daily, I/Os (-450ml); Net -10.3L  ====================    MEDICATIONS  (STANDING):  amiodarone    Tablet 400 milliGRAM(s) Oral every 8 hours  amiodarone    Tablet   Oral   buDESOnide    EC Capsule 9 milliGRAM(s) Oral daily  chlorhexidine 2% Cloths 1 Application(s) Topical at bedtime  dextrose 5%. 1000 milliLiter(s) (50 mL/Hr) IV Continuous <Continuous>  dextrose 50% Injectable 12.5 Gram(s) IV Push once  dextrose 50% Injectable 25 Gram(s) IV Push once  dextrose 50% Injectable 25 Gram(s) IV Push once  docusate sodium 100 milliGRAM(s) Oral two times a day  famotidine    Tablet 20 milliGRAM(s) Oral at bedtime  ferrous    sulfate 325 milliGRAM(s) Oral daily  heparin  Infusion.  Unit(s)/Hr (17 mL/Hr) IV Continuous <Continuous>  insulin lispro (HumaLOG) corrective regimen sliding scale   SubCutaneous three times a day before meals  insulin lispro (HumaLOG) corrective regimen sliding scale   SubCutaneous at bedtime  losartan 25 milliGRAM(s) Oral two times a day  pantoprazole    Tablet 40 milliGRAM(s) Oral every 24 hours  senna 2 Tablet(s) Oral at bedtime  spironolactone 25 milliGRAM(s) Oral daily    MEDICATIONS  (PRN):  benzonatate 100 milliGRAM(s) Oral three times a day PRN Cough  dextrose 40% Gel 15 Gram(s) Oral once PRN Blood Glucose LESS THAN 70 milliGRAM(s)/deciliter  glucagon  Injectable 1 milliGRAM(s) IntraMuscular once PRN Glucose LESS THAN 70 milligrams/deciliter  guaiFENesin    Syrup 100 milliGRAM(s) Oral every 6 hours PRN Cough  heparin  Injectable 7500 Unit(s) IV Push every 6 hours PRN For aPTT less than 40  heparin  Injectable 3500 Unit(s) IV Push every 6 hours PRN For aPTT between 40 - 57  melatonin 3 milliGRAM(s) Oral at bedtime PRN Insomnia  simethicone 80 milliGRAM(s) Chew every 6 hours PRN Indigestion    ====================  VITALS (Last 12 hrs):  ====================  T(C): 36.4 (07-16-19 @ 19:00), Max: 36.9 (07-16-19 @ 15:00)  T(F): 97.6 (07-16-19 @ 19:00), Max: 98.4 (07-16-19 @ 15:00)  HR: 94 (07-16-19 @ 19:00) (86 - 114)  BP: 119/61 (07-16-19 @ 19:00) (94/66 - 119/61)  BP(mean): 80 (07-16-19 @ 19:00) (63 - 89)  RR: 23 (07-16-19 @ 19:00) (16 - 23)  SpO2: 98% (07-16-19 @ 19:00) (93% - 100%)      I&O's Summary    15 Jul 2019 07:01  -  16 Jul 2019 07:00  --------------------------------------------------------  IN: 1170.5 mL / OUT: 2950 mL / NET: -1779.5 mL    16 Jul 2019 07:01  -  16 Jul 2019 19:57  --------------------------------------------------------  IN: 980 mL / OUT: 1300 mL / NET: -320 mL        ====================  NEW LABS:  ====================  07-16    136  |  95<L>  |  25<H>  ----------------------------<  128<H>  3.9   |  28  |  0.65    Ca    7.8<L>      16 Jul 2019 15:45  Phos  2.8     07-16  Mg     1.9     07-16    TPro  6.0  /  Alb  3.3  /  TBili  0.5  /  DBili  x   /  AST  110<H>  /  ALT  731<H>  /  AlkPhos  67  07-16    PT/INR - ( 15 Jul 2019 04:02 )   PT: 14.3 sec;   INR: 1.24 ratio       PTT - ( 16 Jul 2019 14:18 )  PTT:56.0 sec    ====================  PLAN:  ====================  #Acute systolic HF; mostly compensated   -s/p L/RHC: RA 14, RV 44/21, PA 46/20, PCWP 25, LVEDP 27, and CO/CI 3.89/2.04  - Start Lasix 40mg PO daily, strict I/Os, daily wts, keep even to slightly neg  - Cont. Losartan 25mg BID, Aldactone 25mg daily  - Would start low dose beta blocker tomorrow      #Afib; CHADVASC: 5  - Full Heparin gtt per protocol, trend coags   - Cont. Amio po load 400mg TID  - NPO after MN for AGNES/DCCV   - Low dose beta blocker         Raquel Gregorio CCU NP  Beeper #[id number]  Spectra # [phone] ====================  CCU MIDNIGHT ROUNDS  ====================    FRANCE ROBB  [id number]    Patient is a 78y old  Female who presents with a chief complaint of Systolic HF (16 Jul 2019 07:22)    ====================  SUMMARY: 78F with hx Afib on xarelto, HTN, GERD who presented to OSH for SOB, transferred to [facility] for newly diagnosed systolic HF.  ====================    ====================  NEW EVENTS: Lasix IV changed to oral Lasix 40mg daily, I/Os (-450ml); Net -10.3L  ====================    MEDICATIONS  (STANDING):  amiodarone    Tablet 400 milliGRAM(s) Oral every 8 hours  amiodarone    Tablet   Oral   buDESOnide    EC Capsule 9 milliGRAM(s) Oral daily  chlorhexidine 2% Cloths 1 Application(s) Topical at bedtime  dextrose 5%. 1000 milliLiter(s) (50 mL/Hr) IV Continuous <Continuous>  dextrose 50% Injectable 12.5 Gram(s) IV Push once  dextrose 50% Injectable 25 Gram(s) IV Push once  dextrose 50% Injectable 25 Gram(s) IV Push once  docusate sodium 100 milliGRAM(s) Oral two times a day  famotidine    Tablet 20 milliGRAM(s) Oral at bedtime  ferrous    sulfate 325 milliGRAM(s) Oral daily  heparin  Infusion.  Unit(s)/Hr (17 mL/Hr) IV Continuous <Continuous>  insulin lispro (HumaLOG) corrective regimen sliding scale   SubCutaneous three times a day before meals  insulin lispro (HumaLOG) corrective regimen sliding scale   SubCutaneous at bedtime  losartan 25 milliGRAM(s) Oral two times a day  pantoprazole    Tablet 40 milliGRAM(s) Oral every 24 hours  senna 2 Tablet(s) Oral at bedtime  spironolactone 25 milliGRAM(s) Oral daily    MEDICATIONS  (PRN):  benzonatate 100 milliGRAM(s) Oral three times a day PRN Cough  dextrose 40% Gel 15 Gram(s) Oral once PRN Blood Glucose LESS THAN 70 milliGRAM(s)/deciliter  glucagon  Injectable 1 milliGRAM(s) IntraMuscular once PRN Glucose LESS THAN 70 milligrams/deciliter  guaiFENesin    Syrup 100 milliGRAM(s) Oral every 6 hours PRN Cough  heparin  Injectable 7500 Unit(s) IV Push every 6 hours PRN For aPTT less than 40  heparin  Injectable 3500 Unit(s) IV Push every 6 hours PRN For aPTT between 40 - 57  melatonin 3 milliGRAM(s) Oral at bedtime PRN Insomnia  simethicone 80 milliGRAM(s) Chew every 6 hours PRN Indigestion    ====================  VITALS (Last 12 hrs):  ====================  T(C): 36.4 (07-16-19 @ 19:00), Max: 36.9 (07-16-19 @ 15:00)  T(F): 97.6 (07-16-19 @ 19:00), Max: 98.4 (07-16-19 @ 15:00)  HR: 94 (07-16-19 @ 19:00) (86 - 114)  BP: 119/61 (07-16-19 @ 19:00) (94/66 - 119/61)  BP(mean): 80 (07-16-19 @ 19:00) (63 - 89)  RR: 23 (07-16-19 @ 19:00) (16 - 23)  SpO2: 98% (07-16-19 @ 19:00) (93% - 100%)      I&O's Summary    15 Jul 2019 07:01  -  16 Jul 2019 07:00  --------------------------------------------------------  IN: 1170.5 mL / OUT: 2950 mL / NET: -1779.5 mL    16 Jul 2019 07:01  -  16 Jul 2019 19:57  --------------------------------------------------------  IN: 980 mL / OUT: 1300 mL / NET: -320 mL        ====================  NEW LABS:  ====================  07-16    136  |  95<L>  |  25<H>  ----------------------------<  128<H>  3.9   |  28  |  0.65    Ca    7.8<L>      16 Jul 2019 15:45  Phos  2.8     07-16  Mg     1.9     07-16    TPro  6.0  /  Alb  3.3  /  TBili  0.5  /  DBili  x   /  AST  110<H>  /  ALT  731<H>  /  AlkPhos  67  07-16    PT/INR - ( 15 Jul 2019 04:02 )   PT: 14.3 sec;   INR: 1.24 ratio       PTT - ( 16 Jul 2019 14:18 )  PTT:56.0 sec    ====================  PLAN:  ====================  #Acute systolic HF; mostly compensated   -s/p L/RHC: RA 14, RV 44/21, PA 46/20, PCWP 25, LVEDP 27, and CO/CI 3.89/2.04  - Start Lasix 40mg PO daily, strict I/Os, daily wts, keep even to slightly neg  - Cont. Losartan 25mg BID, Aldactone 25mg daily  - Would start low dose beta blocker tomorrow      #Afib; CHADVASC: 5  - Full Heparin gtt per protocol, trend coags   - Cont. Amio po load 400mg TID  - NPO after MN for AGNES/DCCV         Raquel Gregorio CCU NP  Beeper #[id number]  Spectra # [phone]

## 2019-07-16 NOTE — PHYSICAL THERAPY INITIAL EVALUATION ADULT - ADDITIONAL COMMENTS
pt lives in private home with son. 3 steps to enter +0HR +13 steps to enter +1 HR. Prior to admission, pt was I with all functional mobility and ADLs without AD.

## 2019-07-16 NOTE — PHYSICAL THERAPY INITIAL EVALUATION ADULT - IMPAIRED TRANSFERS: SIT/STAND, REHAB EVAL
impaired balance/decreased endurance/decreased flexibility/decreased strength/impaired postural control

## 2019-07-16 NOTE — PHYSICAL THERAPY INITIAL EVALUATION ADULT - PERTINENT HX OF CURRENT PROBLEM, REHAB EVAL
79 yo F presented to OSH for SOB now transferred to [facility] for newly diagnosed acute decompensated systolic HF.  At OSH, found to be overloaded and in AF with RVR. She was found to be in ADHF 2/2 newly depressed EF(EF 30%) Hospital course there complicated by hypotension, NORM, and elevated transaminases so she was transferred to [facility] for further care. XRay Chest 7/15: The heart is normal in size. Resolving CHF. The lungs appear clear.

## 2019-07-16 NOTE — PROGRESS NOTE ADULT - ASSESSMENT
78F with hx Afib on xarelto, HTN, GERD who presented to OSH for SOB, transferred to [facility] for newly diagnosed systolic HF.     Neuro:  -no active issues    Cardiovascular:    1. Acute decompensated systolic heart failure  -TTE from OSH showed EF: 20-25%, mod MR, mod TR, mod diffuse hypokinesis of LV.  She had an echo done in 2017 which showed EF 55-60%. Unclear etiology ischemic vs non-ischemic  -c/w lasix 40mg IVP qd and PRN if U/O declines  -holding BB in setting of ADHF  -losartan 25mg  -s/p LHC/RHC, showed RA 14, RV 44/21, PA 46/20, PCWP 25, LVEDP 27, and CO/CI 3.89/2.04.    2. Afib   -CHADVASC: 5  -holding BB in setting of ADHF, elevated R heart pressures  -rhythm control with amio  -anticoag control with heparin gtt      Pulmonary:  1. Hypoxic respiratory failure  - 2/2 acute decompensated heart failure  -RESOLVED w/ diuresis  -continue to monitor pulse ox  -cxr done yesterday, no significant findings  -c/w supplemental O2 as needed to maintain SpO2 > 92%       GI:    1. Congestive hepatopathy  -LFTs peaked at AST/ALT 4473/2724; 342/1168 yesterday, 159/822 today  -currently trending down, will continue to monitor   - RUQ US performed; showed cholithiasis and severe diffuse gallbladder wall thickening. Surgery was consulted and recommended HIDA scan but no surgical intervention. Patient not actively having symptoms; will hold off on HIDA scan.    2. GERD  - c/w AM protonix and Famotidine qhs    Renal:  1. NORM  -most likely in the 2/2 of low flow state  -continue to monitor Cr  -currently normalized    ID:  -at OSH, was on vanc/cefepime for ?HAP  -blood cx 7/11: NGTD  -will continue to monitor off abx  -normalized    Heme:  -chronic anemia from iron-deficiency anemia  -H/H 9.5/29.7 yesterday; 9.7/30.6 today  -c/w ferrous sulfate   -Will continue to monitor    Prophylactic measure:  DVT ppx: heparin drip 78F with hx Afib on xarelto, HTN, GERD who presented to OSH for SOB, transferred to [facility] for newly diagnosed systolic HF.     Neuro:  -no active issues    Cardiovascular:    1. Acute decompensated systolic heart failure  -TTE from OSH showed EF: 20-25%, mod MR, mod TR, mod diffuse hypokinesis of LV.  She had an echo done in 2017 which showed EF 55-60%. Unclear etiology ischemic vs non-ischemic  -c/w lasix 40mg IV bid to goal -1L  -holding BB in setting of ADHF, will restart once euvolemic  -losartan 25 mg po bid  -spironolactone 25 mg po qd  -s/p LHC/RHC, showed RA 14, RV 44/21, PA 46/20, PCWP 25, LVEDP 27, and CO/CI 3.89/2.04.    2. Afib   -CHADVASC: 5  -holding BB in setting of ADHF, elevated R heart pressures  -rhythm control with amio  -anticoag control with heparin gtt  -will consider AGNES before cardioversion       Pulmonary:  1. Hypoxic respiratory failure  - 2/2 acute decompensated heart failure  -RESOLVED w/ diuresis  -continue to monitor pulse ox  -cxr done yesterday, no significant findings  -c/w supplemental O2 as needed to maintain SpO2 > 92%       GI:    1. Congestive hepatopathy  -LFTs peaked at AST/ALT 4473/2724; 342/1168 yesterday, 159/822 today  -currently trending down, will continue to monitor   - RUQ US performed; showed cholithiasis and severe diffuse gallbladder wall thickening. Surgery was consulted and recommended HIDA scan but no surgical intervention. Patient not actively having symptoms; will hold off on HIDA scan.    2. GERD  - c/w AM protonix and Famotidine qhs    Renal:  1. NORM  -most likely in the 2/2 of low flow state  -continue to monitor Cr, electrolytes   -currently normalized    ID:  -at OSH, was on vanc/cefepime for ?HAP  -blood cx 7/11: NGTD  -will continue to monitor off abx  -normalized    Heme:  -chronic anemia from iron-deficiency anemia  -H/H 9.5/29.7 yesterday; 9.7/30.6 today  -c/w ferrous sulfate   -Will continue to monitor    Prophylactic measure:  DVT ppx: heparin drip

## 2019-07-16 NOTE — PROGRESS NOTE ADULT - SUBJECTIVE AND OBJECTIVE BOX
PATIENT:  FRANCE ROBB  [id number]    CHIEF COMPLAINT:  Patient is a 78y old  Female who presents with a chief complaint of Systolic HF (15 Jul 2019 08:32)      INTERVAL HISTORYOVERNIGHT EVENTS:    Heart cath completed, revealed clear coronaries, but elevated R heart pressures. Femoral sheath removed. Started on Amio load for afib.     REVIEW OF SYSTEMS:    Constitutional:     [ ] negative [ ] fevers [ ] chills [ ] weight loss [ ] weight gain  HEENT:                  [ ] negative [ ] dry eyes [ ] eye irritation [ ] postnasal drip [ ] nasal congestion  CV:                         [ ] negative  [ ] chest pain [ ] orthopnea [ ] palpitations [ ] murmur  Resp:                     [ ] negative [ ] cough [ ] shortness of breath [ ] dyspnea [ ] wheezing [ ] sputum [ ] hemoptysis  GI:                          [ ] negative [ ] nausea [ ] vomiting [ ] diarrhea [ ] constipation [ ] abd pain [ ] dysphagia   :                        [ ] negative [ ] dysuria [ ] nocturia [ ] hematuria [ ] increased urinary frequency  Musculoskeletal: [ ] negative [ ] back pain [ ] myalgias [ ] arthralgias [ ] fracture  Skin:                       [ ] negative [ ] rash [ ] itch  Neurological:        [ ] negative [ ] headache [ ] dizziness [ ] syncope [ ] weakness [ ] numbness  Psychiatric:           [ ] negative [ ] anxiety [ ] depression  Endocrine:            [ ] negative [ ] diabetes [ ] thyroid problem  Heme/Lymph:      [ ] negative [ ] anemia [ ] bleeding problem  Allergic/Immune: [ ] negative [ ] itchy eyes [ ] nasal discharge [ ] hives [ ] angioedema    [ ] All other systems negative  [ ] Unable to assess ROS because ________.    MEDICATIONS:  MEDICATIONS  (STANDING):  amiodarone    Tablet 400 milliGRAM(s) Oral every 8 hours  amiodarone    Tablet   Oral   buDESOnide    EC Capsule 9 milliGRAM(s) Oral daily  chlorhexidine 2% Cloths 1 Application(s) Topical at bedtime  docusate sodium 100 milliGRAM(s) Oral two times a day  famotidine    Tablet 20 milliGRAM(s) Oral at bedtime  ferrous    sulfate 325 milliGRAM(s) Oral daily  furosemide   Injectable 40 milliGRAM(s) IV Push daily  heparin  Infusion.  Unit(s)/Hr (17 mL/Hr) IV Continuous <Continuous>  losartan 25 milliGRAM(s) Oral daily  pantoprazole    Tablet 40 milliGRAM(s) Oral every 24 hours  senna 2 Tablet(s) Oral at bedtime    MEDICATIONS  (PRN):  benzonatate 100 milliGRAM(s) Oral three times a day PRN Cough  guaiFENesin    Syrup 100 milliGRAM(s) Oral every 6 hours PRN Cough  heparin  Injectable 7500 Unit(s) IV Push every 6 hours PRN For aPTT less than 40  heparin  Injectable 3500 Unit(s) IV Push every 6 hours PRN For aPTT between 40 - 57  melatonin 3 milliGRAM(s) Oral at bedtime PRN Insomnia  simethicone 80 milliGRAM(s) Chew every 6 hours PRN Indigestion      ALLERGIES:  Allergies    No Known Allergies    Intolerances        OBJECTIVE:  ICU Vital Signs Last 24 Hrs  T(C): 36.6 (2019 04:00), Max: 37.1 (15 Jul 2019 11:00)  T(F): 97.9 (2019 04:00), Max: 98.7 (15 Jul 2019 11:00)  HR: 106 (2019 06:00) (84 - 116)  BP: 113/81 (2019 06:00) (90/67 - 124/93)  BP(mean): 93 (2019 06:00) (70 - 104)  ABP: --  ABP(mean): --  RR: 21 (2019 06:00) (17 - 32)  SpO2: 99% (2019 06:00) (94% - 100%)      Adult Advanced Hemodynamics Last 24 Hrs  CVP(mm Hg): --  CVP(cm H2O): --  CO: --  CI: --  PA: --  PA(mean): --  PCWP: --  SVR: --  SVRI: --  PVR: --  PVRI: --  CAPILLARY BLOOD GLUCOSE        CAPILLARY BLOOD GLUCOSE        I&O's Summary    15 Jul 2019 07:01  -  2019 07:00  --------------------------------------------------------  IN: 1153.5 mL / OUT: 2450 mL / NET: -1296.5 mL      Daily     Daily Weight in k.2 (2019 05:30)    PHYSICAL EXAMINATION:  General: WN/WD NAD  HEENT: PERRLA, EOMI, moist mucous membranes  Neurology: A&Ox3, nonfocal, BARNEY x 4  Respiratory: CTA B/L, normal respiratory effort, no wheezes, crackles, rales  CV: RRR, S1S2, no murmurs, rubs or gallops  Abdominal: Soft, NT, ND +BS, Last BM  Extremities: No edema, + peripheral pulses  Incisions:   Tubes:    LABS:                          9.7    7.1   )-----------( 234      ( 2019 04:52 )             30.6     07-16    139  |  94<L>  |  25<H>  ----------------------------<  150<H>  4.2   |  31  |  0.76    Ca    7.8<L>      2019 04:52  Phos  3.1     07-16  Mg     2.1     07-16    TPro  5.8<L>  /  Alb  3.0<L>  /  TBili  0.6  /  DBili  x   /  AST  159<H>  /  ALT  822<H>  /  AlkPhos  65  07-16    LIVER FUNCTIONS - ( 2019 04:52 )  Alb: 3.0 g/dL / Pro: 5.8 g/dL / ALK PHOS: 65 U/L / ALT: 822 U/L / AST: 159 U/L / GGT: x           PT/INR - ( 15 Jul 2019 04:02 )   PT: 14.3 sec;   INR: 1.24 ratio         PTT - ( 15 Jul 2019 04:02 )  PTT:66.6 sec            TELEMETRY:     EKG:     IMAGING: PATIENT:  FRANCE ROBB  [id number]    CHIEF COMPLAINT:  Patient is a 78y old  Female who presents with a chief complaint of Systolic HF (15 Jul 2019 08:32)      INTERVAL HISTORYOVERNIGHT EVENTS:    Heart cath completed, revealed clear coronaries, but elevated R heart pressures. Femoral sheath removed. Started on Amio load for afib.     Morning: pt continues to complain of cough, but denies SOB, chest pain, abdominal pain.    REVIEW OF SYSTEMS:    Constitutional:     [ ] negative [ ] fevers [ ] chills [ ] weight loss [ ] weight gain  HEENT:                  [ ] negative [ ] dry eyes [ ] eye irritation [ ] postnasal drip [ ] nasal congestion  CV:                         [ ] negative  [ ] chest pain [ ] orthopnea [ ] palpitations [ ] murmur  Resp:                     [ ] negative [x ] cough [ ] shortness of breath [ ] dyspnea [ ] wheezing [ ] sputum [ ] hemoptysis  GI:                          [ ] negative [ ] nausea [ ] vomiting [ ] diarrhea [ ] constipation [ ] abd pain [ ] dysphagia   :                        [ ] negative [ ] dysuria [ ] nocturia [ ] hematuria [ ] increased urinary frequency  Musculoskeletal: [ ] negative [ ] back pain [ ] myalgias [ ] arthralgias [ ] fracture  Skin:                       [ ] negative [ ] rash [ ] itch  Neurological:        [ ] negative [ ] headache [ ] dizziness [ ] syncope [ ] weakness [ ] numbness  Psychiatric:           [ ] negative [ ] anxiety [ ] depression  Endocrine:            [ ] negative [ ] diabetes [ ] thyroid problem  Heme/Lymph:      [ ] negative [ ] anemia [ ] bleeding problem  Allergic/Immune: [ ] negative [ ] itchy eyes [ ] nasal discharge [ ] hives [ ] angioedema    [x ] All other systems negative  [ ] Unable to assess ROS because ________.    MEDICATIONS:  MEDICATIONS  (STANDING):  amiodarone    Tablet 400 milliGRAM(s) Oral every 8 hours  amiodarone    Tablet   Oral   buDESOnide    EC Capsule 9 milliGRAM(s) Oral daily  chlorhexidine 2% Cloths 1 Application(s) Topical at bedtime  docusate sodium 100 milliGRAM(s) Oral two times a day  famotidine    Tablet 20 milliGRAM(s) Oral at bedtime  ferrous    sulfate 325 milliGRAM(s) Oral daily  furosemide   Injectable 40 milliGRAM(s) IV Push daily  heparin  Infusion.  Unit(s)/Hr (17 mL/Hr) IV Continuous <Continuous>  losartan 25 milliGRAM(s) Oral daily  pantoprazole    Tablet 40 milliGRAM(s) Oral every 24 hours  senna 2 Tablet(s) Oral at bedtime    MEDICATIONS  (PRN):  benzonatate 100 milliGRAM(s) Oral three times a day PRN Cough  guaiFENesin    Syrup 100 milliGRAM(s) Oral every 6 hours PRN Cough  heparin  Injectable 7500 Unit(s) IV Push every 6 hours PRN For aPTT less than 40  heparin  Injectable 3500 Unit(s) IV Push every 6 hours PRN For aPTT between 40 - 57  melatonin 3 milliGRAM(s) Oral at bedtime PRN Insomnia  simethicone 80 milliGRAM(s) Chew every 6 hours PRN Indigestion      ALLERGIES:  Allergies    No Known Allergies    Intolerances        OBJECTIVE:  ICU Vital Signs Last 24 Hrs  T(C): 36.6 (2019 04:00), Max: 37.1 (15 Jul 2019 11:00)  T(F): 97.9 (2019 04:00), Max: 98.7 (15 Jul 2019 11:00)  HR: 106 (2019 06:00) (84 - 116)  BP: 113/81 (2019 06:00) (90/67 - 124/93)  BP(mean): 93 (2019 06:00) (70 - 104)  ABP: --  ABP(mean): --  RR: 21 (2019 06:00) (17 - 32)  SpO2: 99% (2019 06:00) (94% - 100%)      Adult Advanced Hemodynamics Last 24 Hrs  CVP(mm Hg): --  CVP(cm H2O): --  CO: --  CI: --  PA: --  PA(mean): --  PCWP: --  SVR: --  SVRI: --  PVR: --  PVRI: --  CAPILLARY BLOOD GLUCOSE        CAPILLARY BLOOD GLUCOSE        I&O's Summary    15 Jul 2019 07:01  -  2019 07:00  --------------------------------------------------------  IN: 1153.5 mL / OUT: 2450 mL / NET: -1296.5 mL      Daily     Daily Weight in k.2 (2019 05:30)    PHYSICAL EXAMINATION:  General: WN/WD NAD  HEENT: PERRLA, EOMI, moist mucous membranes  Neurology: A&Ox3, nonfocal, BARNEY x 4  Respiratory: CTA B/L, normal respiratory effort, no wheezes, crackles, rales  CV: RRR, S1S2, no murmurs, rubs or gallops  Abdominal: Soft, NT, ND +BS, Last BM  Extremities: Peripheral edema present in LEs, + peripheral pulses    LABS:                          9.7    7.1   )-----------( 234      ( 2019 04:52 )             30.6     07-    139  |  94<L>  |  25<H>  ----------------------------<  150<H>  4.2   |  31  |  0.76    Ca    7.8<L>      2019 04:52  Phos  3.1       Mg     2.1         TPro  5.8<L>  /  Alb  3.0<L>  /  TBili  0.6  /  DBili  x   /  AST  159<H>  /  ALT  822<H>  /  AlkPhos  65  -16    LIVER FUNCTIONS - ( 2019 04:52 )  Alb: 3.0 g/dL / Pro: 5.8 g/dL / ALK PHOS: 65 U/L / ALT: 822 U/L / AST: 159 U/L / GGT: x           PT/INR - ( 15 Jul 2019 04:02 )   PT: 14.3 sec;   INR: 1.24 ratio         PTT - ( 15 Jul 2019 04:02 )  PTT:66.6 sec            TELEMETRY:     EKG:     IMAGING:

## 2019-07-16 NOTE — PHYSICAL THERAPY INITIAL EVALUATION ADULT - PLANNED THERAPY INTERVENTIONS, PT EVAL
balance training/GOAL: Pt will negotiate 10 steps with 1 HR and step to pattern independently in 4 weeks./bed mobility training/transfer training/gait training

## 2019-07-17 DIAGNOSIS — J96.01 ACUTE RESPIRATORY FAILURE WITH HYPOXIA: ICD-10-CM

## 2019-07-17 DIAGNOSIS — N17.9 ACUTE KIDNEY FAILURE, UNSPECIFIED: ICD-10-CM

## 2019-07-17 DIAGNOSIS — K21.9 GASTRO-ESOPHAGEAL REFLUX DISEASE WITHOUT ESOPHAGITIS: ICD-10-CM

## 2019-07-17 DIAGNOSIS — Z79.01 LONG TERM (CURRENT) USE OF ANTICOAGULANTS: ICD-10-CM

## 2019-07-17 DIAGNOSIS — I50.33 ACUTE ON CHRONIC DIASTOLIC (CONGESTIVE) HEART FAILURE: ICD-10-CM

## 2019-07-17 DIAGNOSIS — I50.9 HEART FAILURE, UNSPECIFIED: ICD-10-CM

## 2019-07-17 DIAGNOSIS — E66.9 OBESITY, UNSPECIFIED: ICD-10-CM

## 2019-07-17 DIAGNOSIS — J69.0 PNEUMONITIS DUE TO INHALATION OF FOOD AND VOMIT: ICD-10-CM

## 2019-07-17 DIAGNOSIS — K72.00 ACUTE AND SUBACUTE HEPATIC FAILURE WITHOUT COMA: ICD-10-CM

## 2019-07-17 DIAGNOSIS — I48.0 PAROXYSMAL ATRIAL FIBRILLATION: ICD-10-CM

## 2019-07-17 DIAGNOSIS — E87.2 ACIDOSIS: ICD-10-CM

## 2019-07-17 DIAGNOSIS — D64.9 ANEMIA, UNSPECIFIED: ICD-10-CM

## 2019-07-17 DIAGNOSIS — I95.9 HYPOTENSION, UNSPECIFIED: ICD-10-CM

## 2019-07-17 DIAGNOSIS — I11.0 HYPERTENSIVE HEART DISEASE WITH HEART FAILURE: ICD-10-CM

## 2019-07-17 LAB
ALBUMIN SERPL ELPH-MCNC: 3.2 G/DL — LOW (ref 3.3–5)
ALP SERPL-CCNC: 63 U/L — SIGNIFICANT CHANGE UP (ref 40–120)
ALT FLD-CCNC: 600 U/L — HIGH (ref 10–45)
ANION GAP SERPL CALC-SCNC: 10 MMOL/L — SIGNIFICANT CHANGE UP (ref 5–17)
APTT BLD: 132 SEC — CRITICAL HIGH (ref 27.5–36.3)
APTT BLD: 135.9 SEC — CRITICAL HIGH (ref 27.5–36.3)
APTT BLD: 157 SEC — CRITICAL HIGH (ref 27.5–36.3)
APTT BLD: 67.9 SEC — HIGH (ref 27.5–36.3)
AST SERPL-CCNC: 72 U/L — HIGH (ref 10–40)
BASOPHILS # BLD AUTO: 0 K/UL — SIGNIFICANT CHANGE UP (ref 0–0.2)
BASOPHILS NFR BLD AUTO: 0.4 % — SIGNIFICANT CHANGE UP (ref 0–2)
BILIRUB SERPL-MCNC: 0.5 MG/DL — SIGNIFICANT CHANGE UP (ref 0.2–1.2)
BUN SERPL-MCNC: 20 MG/DL — SIGNIFICANT CHANGE UP (ref 7–23)
CALCIUM SERPL-MCNC: 8.2 MG/DL — LOW (ref 8.4–10.5)
CHLORIDE SERPL-SCNC: 99 MMOL/L — SIGNIFICANT CHANGE UP (ref 96–108)
CO2 SERPL-SCNC: 30 MMOL/L — SIGNIFICANT CHANGE UP (ref 22–31)
CREAT SERPL-MCNC: 0.63 MG/DL — SIGNIFICANT CHANGE UP (ref 0.5–1.3)
EOSINOPHIL # BLD AUTO: 0.2 K/UL — SIGNIFICANT CHANGE UP (ref 0–0.5)
EOSINOPHIL NFR BLD AUTO: 3 % — SIGNIFICANT CHANGE UP (ref 0–6)
GLUCOSE BLDC GLUCOMTR-MCNC: 150 MG/DL — HIGH (ref 70–99)
GLUCOSE BLDC GLUCOMTR-MCNC: 155 MG/DL — HIGH (ref 70–99)
GLUCOSE BLDC GLUCOMTR-MCNC: 166 MG/DL — HIGH (ref 70–99)
GLUCOSE SERPL-MCNC: 129 MG/DL — HIGH (ref 70–99)
HCT VFR BLD CALC: 31.3 % — LOW (ref 34.5–45)
HGB BLD-MCNC: 10.2 G/DL — LOW (ref 11.5–15.5)
LYMPHOCYTES # BLD AUTO: 1.5 K/UL — SIGNIFICANT CHANGE UP (ref 1–3.3)
LYMPHOCYTES # BLD AUTO: 20.1 % — SIGNIFICANT CHANGE UP (ref 13–44)
MAGNESIUM SERPL-MCNC: 2.3 MG/DL — SIGNIFICANT CHANGE UP (ref 1.6–2.6)
MCHC RBC-ENTMCNC: 26.4 PG — LOW (ref 27–34)
MCHC RBC-ENTMCNC: 32.6 GM/DL — SIGNIFICANT CHANGE UP (ref 32–36)
MCV RBC AUTO: 81 FL — SIGNIFICANT CHANGE UP (ref 80–100)
MONOCYTES # BLD AUTO: 1.2 K/UL — HIGH (ref 0–0.9)
MONOCYTES NFR BLD AUTO: 15.2 % — HIGH (ref 2–14)
NEUTROPHILS # BLD AUTO: 4.7 K/UL — SIGNIFICANT CHANGE UP (ref 1.8–7.4)
NEUTROPHILS NFR BLD AUTO: 61.2 % — SIGNIFICANT CHANGE UP (ref 43–77)
PHOSPHATE SERPL-MCNC: 2.9 MG/DL — SIGNIFICANT CHANGE UP (ref 2.5–4.5)
PLATELET # BLD AUTO: 273 K/UL — SIGNIFICANT CHANGE UP (ref 150–400)
POTASSIUM SERPL-MCNC: 4.4 MMOL/L — SIGNIFICANT CHANGE UP (ref 3.5–5.3)
POTASSIUM SERPL-SCNC: 4.4 MMOL/L — SIGNIFICANT CHANGE UP (ref 3.5–5.3)
PROT SERPL-MCNC: 5.7 G/DL — LOW (ref 6–8.3)
RBC # BLD: 3.86 M/UL — SIGNIFICANT CHANGE UP (ref 3.8–5.2)
RBC # FLD: 17 % — HIGH (ref 10.3–14.5)
SODIUM SERPL-SCNC: 139 MMOL/L — SIGNIFICANT CHANGE UP (ref 135–145)
WBC # BLD: 7.6 K/UL — SIGNIFICANT CHANGE UP (ref 3.8–10.5)
WBC # FLD AUTO: 7.6 K/UL — SIGNIFICANT CHANGE UP (ref 3.8–10.5)

## 2019-07-17 PROCEDURE — 93010 ELECTROCARDIOGRAM REPORT: CPT

## 2019-07-17 PROCEDURE — 99291 CRITICAL CARE FIRST HOUR: CPT

## 2019-07-17 PROCEDURE — 99233 SBSQ HOSP IP/OBS HIGH 50: CPT | Mod: GC

## 2019-07-17 RX ORDER — SACUBITRIL AND VALSARTAN 24; 26 MG/1; MG/1
1 TABLET, FILM COATED ORAL
Refills: 0 | Status: DISCONTINUED | OUTPATIENT
Start: 2019-07-17 | End: 2019-07-19

## 2019-07-17 RX ADMIN — SENNA PLUS 2 TABLET(S): 8.6 TABLET ORAL at 21:37

## 2019-07-17 RX ADMIN — Medication 100 MILLIGRAM(S): at 05:48

## 2019-07-17 RX ADMIN — HEPARIN SODIUM 1900 UNIT(S)/HR: 5000 INJECTION INTRAVENOUS; SUBCUTANEOUS at 06:20

## 2019-07-17 RX ADMIN — Medication 9 MILLIGRAM(S): at 05:48

## 2019-07-17 RX ADMIN — Medication 40 MILLIGRAM(S): at 05:48

## 2019-07-17 RX ADMIN — SACUBITRIL AND VALSARTAN 1 TABLET(S): 24; 26 TABLET, FILM COATED ORAL at 18:43

## 2019-07-17 RX ADMIN — HEPARIN SODIUM 1600 UNIT(S)/HR: 5000 INJECTION INTRAVENOUS; SUBCUTANEOUS at 13:40

## 2019-07-17 RX ADMIN — CHLORHEXIDINE GLUCONATE 1 APPLICATION(S): 213 SOLUTION TOPICAL at 05:48

## 2019-07-17 RX ADMIN — AMIODARONE HYDROCHLORIDE 400 MILLIGRAM(S): 400 TABLET ORAL at 13:41

## 2019-07-17 RX ADMIN — AMIODARONE HYDROCHLORIDE 400 MILLIGRAM(S): 400 TABLET ORAL at 22:24

## 2019-07-17 RX ADMIN — Medication 1: at 11:40

## 2019-07-17 RX ADMIN — FAMOTIDINE 20 MILLIGRAM(S): 10 INJECTION INTRAVENOUS at 21:36

## 2019-07-17 RX ADMIN — Medication 100 MILLIGRAM(S): at 18:43

## 2019-07-17 RX ADMIN — HEPARIN SODIUM 0 UNIT(S)/HR: 5000 INJECTION INTRAVENOUS; SUBCUTANEOUS at 23:21

## 2019-07-17 RX ADMIN — HEPARIN SODIUM 0 UNIT(S)/HR: 5000 INJECTION INTRAVENOUS; SUBCUTANEOUS at 12:39

## 2019-07-17 RX ADMIN — Medication 1: at 20:28

## 2019-07-17 RX ADMIN — PANTOPRAZOLE SODIUM 40 MILLIGRAM(S): 20 TABLET, DELAYED RELEASE ORAL at 05:48

## 2019-07-17 RX ADMIN — AMIODARONE HYDROCHLORIDE 400 MILLIGRAM(S): 400 TABLET ORAL at 05:47

## 2019-07-17 RX ADMIN — SPIRONOLACTONE 25 MILLIGRAM(S): 25 TABLET, FILM COATED ORAL at 05:47

## 2019-07-17 RX ADMIN — LOSARTAN POTASSIUM 25 MILLIGRAM(S): 100 TABLET, FILM COATED ORAL at 05:47

## 2019-07-17 RX ADMIN — Medication 325 MILLIGRAM(S): at 13:41

## 2019-07-17 NOTE — PROGRESS NOTE ADULT - ASSESSMENT
79 yo F with a PMH significant for AF on Xarelto, HTN who presented to [facility] with SOB, found to be overloaded and in AF with RVR. She was found to be in ADHF 2/2 newly depressed EF(EF 30%) Hospital course there complicated by hypotension, NORM, and elevated transaminases so she was transferred to [facility] for further care. While in the CCU, she has been diuresed with IV lasix and responded well.

## 2019-07-17 NOTE — PROGRESS NOTE ADULT - PROBLEM SELECTOR PLAN 3
- Had been on Xarelto at home, currently on heparin. - Had been on Xarelto at home, currently on heparin.  - plan for AGNES/DCCV in am  - EP consult per primary team

## 2019-07-17 NOTE — PROVIDER CONTACT NOTE (CRITICAL VALUE NOTIFICATION) - ASSESSMENT
Patient hemodynamically stable and free of chest pain or SOB. No S/S of bleeding noted.
Pt asymptomatic. No signs of bleeding.

## 2019-07-17 NOTE — PROVIDER CONTACT NOTE (CRITICAL VALUE NOTIFICATION) - RECOMMENDATIONS
Decrease Heparin gtt as per protocol
Re-draw lab and assess accordingly
Follow heparin nomogram. Hold heparin for 60 minutes, restart at 13mL/hour.

## 2019-07-17 NOTE — DIETITIAN INITIAL EVALUATION ADULT. - ADD RECOMMEND
1) Continue diet as ordered.  Monitor serum glucose/FS if remain elevated, consistent carbohydrate during admission.  2) Reinforced low Na diet, relationship between Na and fluid.  Pt without questions at this time.  Made aware RD remains available PRN.

## 2019-07-17 NOTE — DIETITIAN INITIAL EVALUATION ADULT. - REASON INDICATOR FOR ASSESSMENT
Pt seen for initial nutrition assessment for ICU length of stay.   Per chart: 78F with hx Afib on xarelto, HTN, GERD who presented to OSH for SOB, transferred to [facility] for newly diagnosed systolic HF.

## 2019-07-17 NOTE — PROGRESS NOTE ADULT - SUBJECTIVE AND OBJECTIVE BOX
PATIENT:  FRANCE ROBB  [id number]    CHIEF COMPLAINT:  Patient is a 78y old  Female who presents with a chief complaint of ADHF (2019 19:56)      INTERVAL HISTORYOVERNIGHT EVENTS:        REVIEW OF SYSTEMS:    Constitutional:     [ ] negative [ ] fevers [ ] chills [ ] weight loss [ ] weight gain  HEENT:                  [ ] negative [ ] dry eyes [ ] eye irritation [ ] postnasal drip [ ] nasal congestion  CV:                         [ ] negative  [ ] chest pain [ ] orthopnea [ ] palpitations [ ] murmur  Resp:                     [ ] negative [ ] cough [ ] shortness of breath [ ] dyspnea [ ] wheezing [ ] sputum [ ] hemoptysis  GI:                          [ ] negative [ ] nausea [ ] vomiting [ ] diarrhea [ ] constipation [ ] abd pain [ ] dysphagia   :                        [ ] negative [ ] dysuria [ ] nocturia [ ] hematuria [ ] increased urinary frequency  Musculoskeletal: [ ] negative [ ] back pain [ ] myalgias [ ] arthralgias [ ] fracture  Skin:                       [ ] negative [ ] rash [ ] itch  Neurological:        [ ] negative [ ] headache [ ] dizziness [ ] syncope [ ] weakness [ ] numbness  Psychiatric:           [ ] negative [ ] anxiety [ ] depression  Endocrine:            [ ] negative [ ] diabetes [ ] thyroid problem  Heme/Lymph:      [ ] negative [ ] anemia [ ] bleeding problem  Allergic/Immune: [ ] negative [ ] itchy eyes [ ] nasal discharge [ ] hives [ ] angioedema    [ ] All other systems negative  [ ] Unable to assess ROS because ________.    MEDICATIONS:  MEDICATIONS  (STANDING):  amiodarone    Tablet 400 milliGRAM(s) Oral every 8 hours  amiodarone    Tablet   Oral   buDESOnide    EC Capsule 9 milliGRAM(s) Oral daily  chlorhexidine 2% Cloths 1 Application(s) Topical at bedtime  dextrose 5%. 1000 milliLiter(s) (50 mL/Hr) IV Continuous <Continuous>  dextrose 50% Injectable 12.5 Gram(s) IV Push once  dextrose 50% Injectable 25 Gram(s) IV Push once  dextrose 50% Injectable 25 Gram(s) IV Push once  docusate sodium 100 milliGRAM(s) Oral two times a day  famotidine    Tablet 20 milliGRAM(s) Oral at bedtime  ferrous    sulfate 325 milliGRAM(s) Oral daily  furosemide    Tablet 40 milliGRAM(s) Oral daily  heparin  Infusion.  Unit(s)/Hr (17 mL/Hr) IV Continuous <Continuous>  insulin lispro (HumaLOG) corrective regimen sliding scale   SubCutaneous three times a day before meals  insulin lispro (HumaLOG) corrective regimen sliding scale   SubCutaneous at bedtime  losartan 25 milliGRAM(s) Oral two times a day  pantoprazole    Tablet 40 milliGRAM(s) Oral every 24 hours  senna 2 Tablet(s) Oral at bedtime  spironolactone 25 milliGRAM(s) Oral daily    MEDICATIONS  (PRN):  benzonatate 100 milliGRAM(s) Oral three times a day PRN Cough  dextrose 40% Gel 15 Gram(s) Oral once PRN Blood Glucose LESS THAN 70 milliGRAM(s)/deciliter  glucagon  Injectable 1 milliGRAM(s) IntraMuscular once PRN Glucose LESS THAN 70 milligrams/deciliter  guaiFENesin    Syrup 100 milliGRAM(s) Oral every 6 hours PRN Cough  heparin  Injectable 7500 Unit(s) IV Push every 6 hours PRN For aPTT less than 40  heparin  Injectable 3500 Unit(s) IV Push every 6 hours PRN For aPTT between 40 - 57  melatonin 3 milliGRAM(s) Oral at bedtime PRN Insomnia  simethicone 80 milliGRAM(s) Chew every 6 hours PRN Indigestion      ALLERGIES:  Allergies    No Known Allergies    Intolerances        OBJECTIVE:  ICU Vital Signs Last 24 Hrs  T(C): 36.7 (2019 05:16), Max: 36.9 (2019 15:00)  T(F): 98 (2019 05:16), Max: 98.4 (2019 15:00)  HR: 86 (2019 06:00) (76 - 114)  BP: 119/70 (2019 06:00) (94/66 - 125/66)  BP(mean): 91 (2019 06:00) (63 - 101)  ABP: --  ABP(mean): --  RR: 21 (2019 06:00) (16 - 28)  SpO2: 98% (2019 06:00) (93% - 100%)      Adult Advanced Hemodynamics Last 24 Hrs  CVP(mm Hg): --  CVP(cm H2O): --  CO: --  CI: --  PA: --  PA(mean): --  PCWP: --  SVR: --  SVRI: --  PVR: --  PVRI: --  CAPILLARY BLOOD GLUCOSE      POCT Blood Glucose.: 223 mg/dL (2019 20:58)    CAPILLARY BLOOD GLUCOSE      POCT Blood Glucose.: 223 mg/dL (2019 20:58)    I&O's Summary    2019 07:01  -  2019 07:00  --------------------------------------------------------  IN: 1318 mL / OUT: 2600 mL / NET: -1282 mL      Daily     Daily Weight in k.9 (2019 05:16)    PHYSICAL EXAMINATION:  General: WN/WD NAD  HEENT: PERRLA, EOMI, moist mucous membranes  Neurology: A&Ox3, nonfocal, BARNEY x 4  Respiratory: CTA B/L, normal respiratory effort, no wheezes, crackles, rales  CV: RRR, S1S2, no murmurs, rubs or gallops  Abdominal: Soft, NT, ND +BS, Last BM  Extremities: No edema, + peripheral pulses  Incisions:   Tubes:    LABS:                          10.2   7.6   )-----------( 273      ( 2019 04:51 )             31.3         139  |  99  |  20  ----------------------------<  129<H>  4.4   |  30  |  0.63    Ca    8.2<L>      2019 04:51  Phos  2.9       Mg     2.3         TPro  5.7<L>  /  Alb  3.2<L>  /  TBili  0.5  /  DBili  x   /  AST  72<H>  /  ALT  600<H>  /  AlkPhos  63      LIVER FUNCTIONS - ( 2019 04:51 )  Alb: 3.2 g/dL / Pro: 5.7 g/dL / ALK PHOS: 63 U/L / ALT: 600 U/L / AST: 72 U/L / GGT: x           PTT - ( 2019 05:51 )  PTT:67.9 sec            TELEMETRY:     EKG:     IMAGING: PATIENT:  FRANCE ROBB  [id number]    CHIEF COMPLAINT:  Patient is a 78y old  Female who presents with a chief complaint of ADHF (2019 19:56)    INTERVAL HISTORYOVERNIGHT EVENTS:    Lasix switched from IV to po overnight. This morning, no acute complaints.     REVIEW OF SYSTEMS:    Constitutional:     [ ] negative [ ] fevers [ ] chills [ ] weight loss [ ] weight gain  HEENT:                  [ ] negative [ ] dry eyes [ ] eye irritation [ ] postnasal drip [ ] nasal congestion  CV:                         [ ] negative  [ ] chest pain [ ] orthopnea [ ] palpitations [ ] murmur  Resp:                     [ ] negative [ ] cough [ ] shortness of breath [ ] dyspnea [ ] wheezing [ ] sputum [ ] hemoptysis  GI:                          [ ] negative [ ] nausea [ ] vomiting [ ] diarrhea [ ] constipation [ ] abd pain [ ] dysphagia   :                        [ ] negative [ ] dysuria [ ] nocturia [ ] hematuria [ ] increased urinary frequency  Musculoskeletal: [ ] negative [ ] back pain [ ] myalgias [ ] arthralgias [ ] fracture  Skin:                       [ ] negative [ ] rash [ ] itch  Neurological:        [ ] negative [ ] headache [ ] dizziness [ ] syncope [ ] weakness [ ] numbness  Psychiatric:           [ ] negative [ ] anxiety [ ] depression  Endocrine:            [ ] negative [ ] diabetes [ ] thyroid problem  Heme/Lymph:      [ ] negative [ ] anemia [ ] bleeding problem  Allergic/Immune: [ ] negative [ ] itchy eyes [ ] nasal discharge [ ] hives [ ] angioedema    [x ] All other systems negative  [ ] Unable to assess ROS because ________.    MEDICATIONS:  MEDICATIONS  (STANDING):  amiodarone    Tablet 400 milliGRAM(s) Oral every 8 hours  amiodarone    Tablet   Oral   buDESOnide    EC Capsule 9 milliGRAM(s) Oral daily  chlorhexidine 2% Cloths 1 Application(s) Topical at bedtime  dextrose 5%. 1000 milliLiter(s) (50 mL/Hr) IV Continuous <Continuous>  dextrose 50% Injectable 12.5 Gram(s) IV Push once  dextrose 50% Injectable 25 Gram(s) IV Push once  dextrose 50% Injectable 25 Gram(s) IV Push once  docusate sodium 100 milliGRAM(s) Oral two times a day  famotidine    Tablet 20 milliGRAM(s) Oral at bedtime  ferrous    sulfate 325 milliGRAM(s) Oral daily  furosemide    Tablet 40 milliGRAM(s) Oral daily  heparin  Infusion.  Unit(s)/Hr (17 mL/Hr) IV Continuous <Continuous>  insulin lispro (HumaLOG) corrective regimen sliding scale   SubCutaneous three times a day before meals  insulin lispro (HumaLOG) corrective regimen sliding scale   SubCutaneous at bedtime  losartan 25 milliGRAM(s) Oral two times a day  pantoprazole    Tablet 40 milliGRAM(s) Oral every 24 hours  senna 2 Tablet(s) Oral at bedtime  spironolactone 25 milliGRAM(s) Oral daily    MEDICATIONS  (PRN):  benzonatate 100 milliGRAM(s) Oral three times a day PRN Cough  dextrose 40% Gel 15 Gram(s) Oral once PRN Blood Glucose LESS THAN 70 milliGRAM(s)/deciliter  glucagon  Injectable 1 milliGRAM(s) IntraMuscular once PRN Glucose LESS THAN 70 milligrams/deciliter  guaiFENesin    Syrup 100 milliGRAM(s) Oral every 6 hours PRN Cough  heparin  Injectable 7500 Unit(s) IV Push every 6 hours PRN For aPTT less than 40  heparin  Injectable 3500 Unit(s) IV Push every 6 hours PRN For aPTT between 40 - 57  melatonin 3 milliGRAM(s) Oral at bedtime PRN Insomnia  simethicone 80 milliGRAM(s) Chew every 6 hours PRN Indigestion      ALLERGIES:  Allergies    No Known Allergies    Intolerances        OBJECTIVE:  ICU Vital Signs Last 24 Hrs  T(C): 36.7 (2019 05:16), Max: 36.9 (2019 15:00)  T(F): 98 (2019 05:16), Max: 98.4 (2019 15:00)  HR: 86 (2019 06:00) (76 - 114)  BP: 119/70 (2019 06:00) (94/66 - 125/66)  BP(mean): 91 (2019 06:00) (63 - 101)  ABP: --  ABP(mean): --  RR: 21 (2019 06:00) (16 - 28)  SpO2: 98% (2019 06:00) (93% - 100%)      Adult Advanced Hemodynamics Last 24 Hrs  CVP(mm Hg): --  CVP(cm H2O): --  CO: --  CI: --  PA: --  PA(mean): --  PCWP: --  SVR: --  SVRI: --  PVR: --  PVRI: --  CAPILLARY BLOOD GLUCOSE      POCT Blood Glucose.: 223 mg/dL (2019 20:58)    CAPILLARY BLOOD GLUCOSE      POCT Blood Glucose.: 223 mg/dL (2019 20:58)    I&O's Summary    2019 07:01  -  2019 07:00  --------------------------------------------------------  IN: 1318 mL / OUT: 2600 mL / NET: -1282 mL      Daily     Daily Weight in k.9 (2019 05:16)    PHYSICAL EXAMINATION:  General: WN/WD NAD  HEENT: PERRLA, EOMI, moist mucous membranes  Neurology: A&Ox3, nonfocal, BARNEY x 4  Respiratory: CTA B/L, normal respiratory effort, no wheezes, crackles, rales  CV: RRR, S1S2, no murmurs, rubs or gallops  Abdominal: Soft, NT, ND +BS, Last BM  Extremities: No edema, + peripheral pulses  Incisions:   Tubes:    LABS:                          10.2   7.6   )-----------( 273      ( 2019 04:51 )             31.3         139  |  99  |  20  ----------------------------<  129<H>  4.4   |  30  |  0.63    Ca    8.2<L>      2019 04:51  Phos  2.9       Mg     2.3         TPro  5.7<L>  /  Alb  3.2<L>  /  TBili  0.5  /  DBili  x   /  AST  72<H>  /  ALT  600<H>  /  AlkPhos  63      LIVER FUNCTIONS - ( 2019 04:51 )  Alb: 3.2 g/dL / Pro: 5.7 g/dL / ALK PHOS: 63 U/L / ALT: 600 U/L / AST: 72 U/L / GGT: x           PTT - ( 2019 05:51 )  PTT:67.9 sec            TELEMETRY:     EKG:     IMAGING:

## 2019-07-17 NOTE — PROVIDER CONTACT NOTE (CRITICAL VALUE NOTIFICATION) - BACKGROUND
Patient came transferred from [facility] with SOB and weakness. Patient in Afib with RVR on Heparin gtt
Pt admitted for HF.

## 2019-07-17 NOTE — PROGRESS NOTE ADULT - ASSESSMENT
78F with hx Afib on xarelto, HTN, GERD who presented to OSH for SOB, transferred to [facility] for newly diagnosed systolic HF.     Neuro:  -no active issues    Cardiovascular:    1. Acute decompensated systolic heart failure  -TTE from OSH showed EF: 20-25%, mod MR, mod TR, mod diffuse hypokinesis of LV.  She had an echo done in 2017 which showed EF 55-60%. Unclear etiology ischemic vs non-ischemic  -c/w lasix 40mg po bid, strict I/Os  -holding BB in setting of ADHF, will restart once euvolemic  -losartan 25 mg po bid  -spironolactone 25 mg po qd  -s/p LHC/RHC, showed RA 14, RV 44/21, PA 46/20, PCWP 25, LVEDP 27, and CO/CI 3.89/2.04    2. Afib   -CHADVASC: 5  -holding BB in setting of ADHF, elevated R heart pressures  -rhythm control with amio 400 mg po tid  -anticoag control with heparin gtt  -NPO for planned AGNES/DCCV      Pulmonary:  1. Hypoxic respiratory failure  - 2/2 acute decompensated heart failure  -RESOLVED w/ diuresis  -continue to monitor pulse ox  -cxr done yesterday, no significant findings  -c/w supplemental O2 as needed to maintain SpO2 > 92%       GI:    1. Congestive hepatopathy  -LFTs peaked at AST/ALT 4473/2724; 159/822 yesterday, 72/600 today  -currently trending down, will continue to monitor   -RUQ US performed; showed cholithiasis and severe diffuse gallbladder wall thickening. Surgery was consulted and recommended HIDA scan but no surgical intervention. Patient not actively having symptoms; will hold off on HIDA scan.    2. GERD  - c/w AM protonix and Famotidine qhs    3. Constipation  -bowel regimen: colace, senna, mylicon    Renal:  1. NORM  -most likely in the 2/2 of low flow state  -continue to monitor Cr, electrolytes   -currently normalized    ID:  -at OSH, was on vanc/cefepime for ?HAP  -blood cx 7/11: NGTD  -will continue to monitor off abx  -normalized    Heme:  -chronic anemia from iron-deficiency anemia  -H/H 9.7/30.6 yesterday, 10.2/31.3 today, will continue to monitor  -PTT kylee to 157 overnight, repeat draw 67.9    -c/w ferrous sulfate    Prophylactic measure:  DVT ppx: heparin drip 78F with hx Afib on xarelto, HTN, GERD who presented to OSH for SOB, transferred to [facility] for newly diagnosed systolic HF.     Neuro:  -no active issues    Cardiovascular:    1. Acute decompensated systolic heart failure  -TTE from OSH showed EF: 20-25%, mod MR, mod TR, mod diffuse hypokinesis of LV.  She had an echo done in 2017 which showed EF 55-60%. Unclear etiology ischemic vs non-ischemic  -c/w lasix 40mg po bid, strict I/Os (-1.2 L past 24 hours)  -holding BB in setting of ADHF, will restart once euvolemic  -losartan 25 mg po bid to be switched to Entresto 24-26 BID starting tonight, per HF recs  -spironolactone 25 mg po qd  -s/p LHC/RHC, showed RA 14, RV 44/21, PA 46/20, PCWP 25, LVEDP 27, and CO/CI 3.89/2.04    2. Afib   -CHADVASC: 5  -holding BB in setting of ADHF, elevated R heart pressures  -rhythm control with amio 400 mg po tid  -anticoag control with heparin gtt (will transition to xarelto when pt more stable)  -NPO at midnight for planned AGNES/DCCV tomorrow, EP on board      Pulmonary:  1. Hypoxic respiratory failure  - 2/2 acute decompensated heart failure  -RESOLVED w/ diuresis  -continue to monitor pulse ox  -cxr done yesterday, no significant findings  -c/w supplemental O2 as needed to maintain SpO2 > 92%       GI:    1. Congestive hepatopathy  -LFTs peaked at AST/ALT 4473/2724; 159/822 yesterday, 72/600 today  -currently trending down, will continue to monitor   -RUQ US performed; showed cholithiasis and severe diffuse gallbladder wall thickening. Surgery was consulted and recommended HIDA scan but no surgical intervention. Patient not actively having symptoms; will hold off on HIDA scan.    2. GERD  - c/w AM protonix and Famotidine qhs    3. Constipation  -bowel regimen: colace, senna, mylicon    Renal:  1. NORM  -most likely in the 2/2 of low flow state  -continue to monitor Cr, electrolytes   -currently normalized    ID:  -at OSH, was on vanc/cefepime for ?HAP  -blood cx 7/11: NGTD  -will continue to monitor off abx  -normalized    Heme:  -chronic anemia from iron-deficiency anemia  -H/H 9.7/30.6 yesterday, 10.2/31.3 today, will continue to monitor  -PTT kylee to 157 overnight, repeat draw 67.9  -c/w ferrous sulfate    Prophylactic measure:  DVT ppx: heparin drip

## 2019-07-17 NOTE — DIETITIAN INITIAL EVALUATION ADULT. - OTHER INFO
Source: Patient, family member (at bedside), electronic medical record    INTAKE PTA: Pt reports good appetite/intake at baseline, though notes decline leading up to admission.  Notes that she has a "good" diet - eating vegetables, fiber, whole grain, varies protein ("meat one day, then chicken, then fish.") No intake of processed foods; home prepared meals, including soups.  Does not use salt at the table.  Confirms NKFA.    SUPPLEMENTS: multivitamin, fish oil, flax seed, D3 + calcium, magnesium, krill, vitamin E, vitamin C - family states plan to review with MD team upon discharge/in context of medication, consider just multivitamin vs. multivitamin with single nutrients added (vitamin E and vitamin C).    WEIGHT HISTORY: Pt reports previous weight of 225 lbs, with intentional weight loss to "190ish" after improving diet with guidance rom GI doctor.  Per [facility] records: 194.2 lbs (standing, 5/17/17).  Weight on admission 205.2 lbs (bed).  Current bed weight 195.9 lbs. Likely increase in weight to 205lbs in context of HF/fluid accumulation.  Current bed weight indicates dry weight largely stable x 2 years.  Consider standing weight, when feasible.    THIS ADMISSION: Pt denies n/v, chewing/swallowing issues, bowel regimen for constipation - states BMs now improved. Denies abd pain (noted previously per MD note).  RD encouraged continued low sodium intake, reviewed relationship between Na and fluid.  Patient without questions at this time.  Made aware RD remains available PRN. Instances of elevated FS noted, HgbA1c WDL. Continue to monitor - if serum glucose elevated, recommend consistent carbohydrate restriction during admission.

## 2019-07-17 NOTE — DIETITIAN INITIAL EVALUATION ADULT. - PHYSICAL APPEARANCE
other (specify) Skin per nursing documentation: no pressure injuries noted  Edema: 1+ L/R ankle  ht: 61 inches, weight: 195.9 lbs, BMI: 37 kg/m2, IBW: 121 lbs (+/- 10%) for BMI of 23, %IBW: 161%

## 2019-07-17 NOTE — CHART NOTE - NSCHARTNOTEFT_GEN_A_CORE
MAR Accept Note:   78 year old woman with Afib on Xarelto, HTN presented to OSH for SOB now transferred to [facility] for newly diagnosed acute decompensated systolic heart failure.      At the OSH, patient was found to be in afib with RVR and was given IV cardizem 10mg.  Patient was also loaded with digoxin.  Patient's lactate was 9.8 and received 2L IVF bolus.  Patient had TTE done which showed EF 20-25%, mod MR, mod TR, mod diffuse hypokinesis of LV.  She had an echo done in 2017 which showed EF 55-60%. Patient was given a few doses of IV lasix 40mg (last dose 7/11 at 2PM) but was hypotensive at 7/12 at 2 AM to 75/54 and was given 1L bolus then also and SBP in the 90s.  Patient did not get diuresis afterwards.  Patient was never placed on pressor support.  Patient was also started on amio load. Patient presented with leukocytosis of 16-17 but was afebrile.  There was concern for HAP and patient was started on vanc/cefepime.  Blood cx from 07/11 were negative. Patient had a CT angio which was negative for PE but b/l pleural effusions and CT abd/pelvis which showed gallbladder wall thickening and colonic diverticulosis without diverticulitis.  Abd US was done which showed cholithiasis and severe diffuse gallbladder wall thickening.  Surgery was consulted and recommended HIDA scan but no surgical intervention.  Over the course of the hospital, patient developed an NORM (1.41 from 0.85 on admission) and shock liver (AST 4473, ALT 2724, from normal levels.) Lactate trended down to 3.1. Patient transferred to [facility] for possible candidate for invasive cardiac transport.     At [facility], patient in fluid overload state and currently on 2L NC.  History is significant for progressive SOB since 01/19.  Patient would take a few steps and become SOB.  Patient does see a cardiologist Dr. Cerda as outpatient and started her on furosemide 40mg three times a week. Per family, patient's other outpatient medications included valsartan, HCTZ, cardizem but patient does not have a list of her medications. Patient did not receive an echo since 2017 and has never had a LHC/RHC.  Currently, patient endorses nausea and acid reflux and bloating.  Patient also complains of cough.  Has mild SOB but denies any chest pain, lightheadedness, dizziness.     In CCU, patient was diagnosed with ADHF and diuresed with IV lasix. Her 7/13 TTE showed severe segmental left ventricular systolic dysfunction. EF 25-30%, severe pulmonary hypertension, and moderate - Severe tricuspid. 7/15 R/L heart cath showed clear coronaries, but elevated R heart pressures. Aldactone started 7/16. Pt ultimately started on Entresto 7/17. She was ultimately switched to po lasix once more euvolemic. Regarding her afib, she was placed on a heparin drip with amio started 7/15. Decision was made to take to EP lab for AGNES/CVVA 7/20    ASSESSMENT & PLAN:   78F with hx Afib on xarelto, HTN, GERD who presented to OSH for SOB, transferred to [facility] for newly diagnosed systolic HF.     Neuro:  -no active issues    Cardiovascular:  1. Acute decompensated systolic heart failure  -TTE from OSH showed EF: 20-25%, mod MR, mod TR, mod diffuse hypokinesis of LV.  She had an echo done in 2017 which showed EF 55-60%. Unclear etiology ischemic vs non-ischemic  -c/w lasix 40mg po bid, strict I/Os (-1.2 L past 24 hours)  -holding BB in setting of ADHF, will restart once euvolemic  -losartan 25 mg po bid to be switched to Entresto 24-26 BID starting tonight, per HF recs  -spironolactone 25 mg po qd  -s/p LHC/RHC, showed RA 14, RV 44/21, PA 46/20, PCWP 25, LVEDP 27, and CO/CI 3.89/2.04    2. Afib   -CHADVASC: 5  -holding BB in setting of ADHF, elevated R heart pressures  -rhythm control with amio 400 mg po tid  -anticoag control with heparin gtt (will transition to xarelto when pt more stable)  -NPO at midnight for planned AGNES/DCCV tomorrow, EP on board    Pulmonary:  1. Hypoxic respiratory failure  - 2/2 acute decompensated heart failure  -RESOLVED w/ diuresis  -continue to monitor pulse ox  -cxr done yesterday, no significant findings  -c/w supplemental O2 as needed to maintain SpO2 > 92%     GI:  1. Congestive hepatopathy  -LFTs peaked at AST/ALT 4473/2724; 159/822 yesterday, 72/600 today  -currently trending down, will continue to monitor   -RUQ US performed; showed cholithiasis and severe diffuse gallbladder wall thickening. Surgery was consulted and recommended HIDA scan but no surgical intervention. Patient not actively having symptoms; will hold off on HIDA scan.    2. GERD  - c/w AM protonix and Famotidine qhs    3. Constipation  -bowel regimen: colace, senna, mylicon    Renal:  1. NORM  -most likely in the 2/2 of low flow state  -continue to monitor Cr, electrolytes   -currently normalized    ID:  -at OSH, was on vanc/cefepime for ?HAP  -blood cx 7/11: NGTD  -will continue to monitor off abx  -normalized    Heme:  -chronic anemia from iron-deficiency anemia  -H/H 9.7/30.6 yesterday, 10.2/31.3 today, will continue to monitor  -PTT kylee to 157 overnight, repeat draw 67.9  -c/w ferrous sulfate    Prophylactic measure:  DVT ppx: heparin drip     FOR FOLLOW UP:  [ ] NPO at midnight for planned AGNES/DCCV tomorrow, EP on board  [ ] f/u on Heart Failure recs MAR Accept Note:   78 year old woman with Afib on Xarelto, HTN presented to OSH for SOB now transferred to [facility] for newly diagnosed acute decompensated systolic heart failure.      At the OSH, patient was found to be in afib with RVR and was given IV cardizem 10mg.  Patient was also loaded with digoxin.  Patient's lactate was 9.8 and received 2L IVF bolus.  Patient had TTE done which showed EF 20-25%, mod MR, mod TR, mod diffuse hypokinesis of LV.  She had an echo done in 2017 which showed EF 55-60%. Patient was given a few doses of IV lasix 40mg (last dose 7/11 at 2PM) but was hypotensive at 7/12 at 2 AM to 75/54 and was given 1L bolus then also and SBP in the 90s.  Patient did not get diuresis afterwards.  Patient was never placed on pressor support.  Patient was also started on amio load. Patient presented with leukocytosis of 16-17 but was afebrile.  There was concern for HAP and patient was started on vanc/cefepime.  Blood cx from 07/11 were negative. Patient had a CT angio which was negative for PE but b/l pleural effusions and CT abd/pelvis which showed gallbladder wall thickening and colonic diverticulosis without diverticulitis.  Abd US was done which showed cholithiasis and severe diffuse gallbladder wall thickening.  Surgery was consulted and recommended HIDA scan but no surgical intervention.  Over the course of the hospital, patient developed an NORM (1.41 from 0.85 on admission) and shock liver (AST 4473, ALT 2724, from normal levels.) Lactate trended down to 3.1. Patient transferred to [facility] for possible candidate for invasive cardiac transport.     At [facility], patient in fluid overload state and currently on 2L NC.  History is significant for progressive SOB since 01/19.  Patient would take a few steps and become SOB.  Patient does see a cardiologist Dr. Cerda as outpatient and started her on furosemide 40mg three times a week. Per family, patient's other outpatient medications included valsartan, HCTZ, cardizem but patient does not have a list of her medications. Patient did not receive an echo since 2017 and has never had a LHC/RHC.  Currently, patient endorses nausea and acid reflux and bloating.  Patient also complains of cough.  Has mild SOB but denies any chest pain, lightheadedness, dizziness.     In CCU, patient was diagnosed with ADHF and diuresed with IV lasix. Her 7/13 TTE showed severe segmental left ventricular systolic dysfunction. EF 25-30%, severe pulmonary hypertension, and moderate - Severe tricuspid. 7/15 R/L heart cath showed clear coronaries, but elevated R heart pressures. Aldactone started 7/16. Pt ultimately started on Entresto 7/17. She was ultimately switched to po lasix once more euvolemic. Regarding her afib, she was placed on a heparin drip with amio started 7/15. Decision was made to take to EP lab for AGNES/CVVA 7/20    ASSESSMENT & PLAN:   78F with hx Afib on xarelto, HTN, GERD who presented to OSH for SOB, transferred to [facility] for newly diagnosed systolic HF.     Neuro:  -no active issues    Cardiovascular:  1. Acute decompensated systolic heart failure  -TTE from OSH showed EF: 20-25%, mod MR, mod TR, mod diffuse hypokinesis of LV.  She had an echo done in 2017 which showed EF 55-60%. Unclear etiology ischemic vs non-ischemic  -c/w lasix 40mg po bid, strict I/Os (-1.2 L past 24 hours)  -holding BB in setting of ADHF, will restart once euvolemic  -losartan 25 mg po bid to be switched to Entresto 24-26 BID starting tonight, per HF recs  -spironolactone 25 mg po qd  -s/p LHC/RHC, showed RA 14, RV 44/21, PA 46/20, PCWP 25, LVEDP 27, and CO/CI 3.89/2.04    2. Afib   -CHADVASC: 5  -holding BB in setting of ADHF, elevated R heart pressures  -rhythm control with amio 400 mg po tid  -anticoag control with heparin gtt (will transition to xarelto when pt more stable)  -NPO at midnight for planned AGNES/DCCV tomorrow, EP on board    Pulmonary:  1. Hypoxic respiratory failure  - 2/2 acute decompensated heart failure  -RESOLVED w/ diuresis  -continue to monitor pulse ox  -cxr done yesterday, no significant findings  -c/w supplemental O2 as needed to maintain SpO2 > 92%     GI:  1. Congestive hepatopathy  -LFTs peaked at AST/ALT 4473/2724; 159/822 yesterday, 72/600 today  -currently trending down, will continue to monitor   -RUQ US performed; showed cholithiasis and severe diffuse gallbladder wall thickening. Surgery was consulted and recommended HIDA scan but no surgical intervention. Patient not actively having symptoms; will hold off on HIDA scan.    2. GERD  - c/w AM protonix and Famotidine qhs    3. Constipation  -bowel regimen: colace, senna, mylicon    Renal:  1. NORM  -most likely in the 2/2 of low flow state  -continue to monitor Cr, electrolytes   -currently normalized    ID:  -at OSH, was on vanc/cefepime for ?HAP  -blood cx 7/11: NGTD  -will continue to monitor off abx  -normalized    Heme:  -chronic anemia from iron-deficiency anemia  -H/H 9.7/30.6 yesterday, 10.2/31.3 today, will continue to monitor  -PTT kylee to 157 overnight, repeat draw 67.9  -c/w ferrous sulfate    Prophylactic measure:  DVT ppx: heparin drip     FOR FOLLOW UP:  [ ] NPO at midnight for planned AGNES/DCCV tomorrow, EP on board  [ ] f/u tele   [ ] f/u on Heart Failure recs

## 2019-07-17 NOTE — PROGRESS NOTE ADULT - PROBLEM SELECTOR PLAN 1
Acute systolic (congestive) heart failure. Recommendation: TTE with LA 4.9, LVIDd 4.7, EF 30%, RV enlargement with decreased RV systolic function, PASP 62. TTE from 2017 with EF 55-60%, mod-sev MR.  RHC with RA 14, RV 44/5, PA 46/20/30, PCW 25, LVEDP 10, MAP 85, PA sat 43  CO/CI 3.18/1.66  SVR 1792  - continue losartan 25mg PO BID, aldactone 25mg PO daily  - continue furosemide 40mg PO daily  - strict Is/Os  - daily weights  - 1L fluid restriction  - K > 4, Mg > 2. Acute systolic (congestive) heart failure. Recommendation: TTE with LA 4.9, LVIDd 4.7, EF 30%, RV enlargement with decreased RV systolic function, PASP 62. TTE from 2017 with EF 55-60%, mod-sev MR.  RHC with RA 14, RV 44/5, PA 46/20/30, PCW 25, LVEDP 10, MAP 85, PA sat 43  CO/CI 3.18/1.66  SVR 1792  Is/Os 1.3/2.6L  - continue losartan 25mg PO BID, aldactone 25mg PO daily  - continue furosemide 40mg PO daily  - strict Is/Os  - daily weights  - 1L fluid restriction  - K > 4, Mg > 2. Acute systolic (congestive) heart failure. Recommendation: TTE with LA 4.9, LVIDd 4.7, EF 30%, RV enlargement with decreased RV systolic function, PASP 62. TTE from 2017 with EF 55-60%, mod-sev MR.  RHC with RA 14, RV 44/5, PA 46/20/30, PCW 25, LVEDP 10, MAP 85, PA sat 43  CO/CI 3.18/1.66  SVR 1792  Is/Os 1.3/2.6L  - switch losartan to Entresto 24/26 BID, first dose tonight  - continue aldactone 25mg PO daily, furosemide 40mg PO daily  - will re-assess need for further diuretics given elevated JVP and plan for AGNES/DCCV tomorrow  - strict Is/Os  - daily weights  - 1L fluid restriction  - K > 4, Mg > 2.

## 2019-07-17 NOTE — PROGRESS NOTE ADULT - SUBJECTIVE AND OBJECTIVE BOX
Patient seen and examined at bedside.    Overnight Events:     REVIEW OF SYSTEMS:  CONSTITUTIONAL: No weakness, fevers or chills  EYES/ENT: No visual changes;  No dysphagia  NECK: No pain or stiffness  RESPIRATORY: + SOB, No cough, wheezing, hemoptysis  CARDIOVASCULAR: No chest pain or palpitations; + lower extremity edema  GASTROINTESTINAL: No abdominal or epigastric pain. No nausea, vomiting, or hematemesis; No diarrhea or constipation. No melena or hematochezia.  BACK: No back pain  GENITOURINARY: No dysuria, frequency or hematuria  NEUROLOGICAL: No numbness or weakness  SKIN: No itching, burning, rashes, or lesions     Current Meds:  amiodarone    Tablet 400 milliGRAM(s) Oral every 8 hours  amiodarone    Tablet   Oral   benzonatate 100 milliGRAM(s) Oral three times a day PRN  buDESOnide    EC Capsule 9 milliGRAM(s) Oral daily  chlorhexidine 2% Cloths 1 Application(s) Topical at bedtime  dextrose 40% Gel 15 Gram(s) Oral once PRN  dextrose 5%. 1000 milliLiter(s) IV Continuous <Continuous>  dextrose 50% Injectable 12.5 Gram(s) IV Push once  dextrose 50% Injectable 25 Gram(s) IV Push once  dextrose 50% Injectable 25 Gram(s) IV Push once  docusate sodium 100 milliGRAM(s) Oral two times a day  famotidine    Tablet 20 milliGRAM(s) Oral at bedtime  ferrous    sulfate 325 milliGRAM(s) Oral daily  furosemide    Tablet 40 milliGRAM(s) Oral daily  glucagon  Injectable 1 milliGRAM(s) IntraMuscular once PRN  guaiFENesin    Syrup 100 milliGRAM(s) Oral every 6 hours PRN  heparin  Infusion.  Unit(s)/Hr IV Continuous <Continuous>  heparin  Injectable 7500 Unit(s) IV Push every 6 hours PRN  heparin  Injectable 3500 Unit(s) IV Push every 6 hours PRN  insulin lispro (HumaLOG) corrective regimen sliding scale   SubCutaneous three times a day before meals  insulin lispro (HumaLOG) corrective regimen sliding scale   SubCutaneous at bedtime  losartan 25 milliGRAM(s) Oral two times a day  melatonin 3 milliGRAM(s) Oral at bedtime PRN  pantoprazole    Tablet 40 milliGRAM(s) Oral every 24 hours  senna 2 Tablet(s) Oral at bedtime  simethicone 80 milliGRAM(s) Chew every 6 hours PRN  spironolactone 25 milliGRAM(s) Oral daily      Vitals:  T(F): 98.2 (07-17), Max: 98.4 (07-16)  HR: 86 (07-17) (76 - 114)  BP: 116/63 (07-17) (94/66 - 125/66)  RR: 19 (07-17)  SpO2: 99% (07-17)  I&O's Summary    16 Jul 2019 07:01  -  17 Jul 2019 07:00  --------------------------------------------------------  IN: 1318 mL / OUT: 2600 mL / NET: -1282 mL    17 Jul 2019 07:01  -  17 Jul 2019 08:02  --------------------------------------------------------  IN: 19 mL / OUT: 0 mL / NET: 19 mL        Physical Exam:  GENERAL: No acute distress, well-developed  HEAD:  Atraumatic, Normocephalic  ENT: EOMI, PERRLA, conjunctiva and sclera clear, Neck supple, JVD to clavicle, + HJR  CHEST/LUNG: Clear to auscultation bilaterally; No wheeze, equal breath sounds bilaterally   HEART: Regular rate and rhythm; No murmurs, rubs, or gallops  ABDOMEN: Soft, Nontender, Nondistended; Bowel sounds present  EXTREMITIES:  1+ edema  PSYCH: Nl behavior, nl affect  NEUROLOGY: AAOx3, non-focal, cranial nerves intact  SKIN: Normal color, No rashes or lesions                          10.2   7.6   )-----------( 273      ( 17 Jul 2019 04:51 )             31.3     07-17    139  |  99  |  20  ----------------------------<  129<H>  4.4   |  30  |  0.63    Ca    8.2<L>      17 Jul 2019 04:51  Phos  2.9     07-17  Mg     2.3     07-17    TPro  5.7<L>  /  Alb  3.2<L>  /  TBili  0.5  /  DBili  x   /  AST  72<H>  /  ALT  600<H>  /  AlkPhos  63  07-17    PTT - ( 17 Jul 2019 05:51 )  PTT:67.9 sec  CARDIAC MARKERS ( 12 Jul 2019 19:58 )  89 ng/L / x     / x     / 97 U/L / x     / 3.6 ng/mL  CARDIAC MARKERS ( 11 Jul 2019 22:27 )  x     / 0.426 ng/mL / x     / x     / x     / x      CARDIAC MARKERS ( 11 Jul 2019 10:42 )  x     / 0.265 ng/mL / x     / 50 U/L / x     / x      CARDIAC MARKERS ( 11 Jul 2019 07:31 )  x     / 0.207 ng/mL / x     / 42 U/L / x     / x      CARDIAC MARKERS ( 11 Jul 2019 04:17 )  x     / 0.025 ng/mL / x     / x     / x     / x          Serum Pro-Brain Natriuretic Peptide: 13776 pg/mL (07-12 @ 19:58)  Serum Pro-Brain Natriuretic Peptide: 3185 pg/mL (07-11 @ 04:17)      Cardiovascular Diagnostic Testing:    Echo: Personally reviewed:  7/13/19  LA 4.9  LVIDd 4.7  EF 30%  Conclusions:  1. Tethered mitral valve leaflets with normal opening.  Mitral annular calcification. Moderate mitral  regurgitation.  2. Severe segmental left ventricular systolic dysfunction.  Multiple  segments appears Dyssynchronous. EF 25-30%  3. Right ventricular enlargement with decreased right  ventricular systolic function.  4. Estimated right ventricular systolic pressure equals 62  mm Hg, assuming right atrial pressure equals 12 mm Hg,  consistent with severe pulmonary hypertension.  5. Normal tricuspid valve. moderate - Severe tricuspid  regurgitation.  6. Estimated pulmonary artery systolic pressure equals 62  mm Hg, assuming right atrial pressure equals 12 mm Hg,  consistent with severe pulmonary pressures.  *** No previous Echo exam.    Imaging:    CXR: Personally reviewed: small b/l pleural effusions      Interpretation of Telemetry: Patient seen and examined at bedside.    Overnight Events: NAEO, resting comfortably this am.    REVIEW OF SYSTEMS:  CONSTITUTIONAL: No weakness, fevers or chills  EYES/ENT: No visual changes;  No dysphagia  NECK: No pain or stiffness  RESPIRATORY: + SOB, No cough, wheezing, hemoptysis  CARDIOVASCULAR: No chest pain or palpitations; + lower extremity edema  GASTROINTESTINAL: No abdominal or epigastric pain. No nausea, vomiting, or hematemesis; No diarrhea or constipation. No melena or hematochezia.  BACK: No back pain  GENITOURINARY: No dysuria, frequency or hematuria  NEUROLOGICAL: No numbness or weakness  SKIN: No itching, burning, rashes, or lesions     Current Meds:  amiodarone    Tablet 400 milliGRAM(s) Oral every 8 hours  amiodarone    Tablet   Oral   benzonatate 100 milliGRAM(s) Oral three times a day PRN  buDESOnide    EC Capsule 9 milliGRAM(s) Oral daily  chlorhexidine 2% Cloths 1 Application(s) Topical at bedtime  dextrose 40% Gel 15 Gram(s) Oral once PRN  dextrose 5%. 1000 milliLiter(s) IV Continuous <Continuous>  dextrose 50% Injectable 12.5 Gram(s) IV Push once  dextrose 50% Injectable 25 Gram(s) IV Push once  dextrose 50% Injectable 25 Gram(s) IV Push once  docusate sodium 100 milliGRAM(s) Oral two times a day  famotidine    Tablet 20 milliGRAM(s) Oral at bedtime  ferrous    sulfate 325 milliGRAM(s) Oral daily  furosemide    Tablet 40 milliGRAM(s) Oral daily  glucagon  Injectable 1 milliGRAM(s) IntraMuscular once PRN  guaiFENesin    Syrup 100 milliGRAM(s) Oral every 6 hours PRN  heparin  Infusion.  Unit(s)/Hr IV Continuous <Continuous>  heparin  Injectable 7500 Unit(s) IV Push every 6 hours PRN  heparin  Injectable 3500 Unit(s) IV Push every 6 hours PRN  insulin lispro (HumaLOG) corrective regimen sliding scale   SubCutaneous three times a day before meals  insulin lispro (HumaLOG) corrective regimen sliding scale   SubCutaneous at bedtime  losartan 25 milliGRAM(s) Oral two times a day  melatonin 3 milliGRAM(s) Oral at bedtime PRN  pantoprazole    Tablet 40 milliGRAM(s) Oral every 24 hours  senna 2 Tablet(s) Oral at bedtime  simethicone 80 milliGRAM(s) Chew every 6 hours PRN  spironolactone 25 milliGRAM(s) Oral daily      Vitals:  T(F): 98.2 (07-17), Max: 98.4 (07-16)  HR: 86 (07-17) (76 - 114)  BP: 116/63 (07-17) (94/66 - 125/66)  RR: 19 (07-17)  SpO2: 99% (07-17)  I&O's Summary    16 Jul 2019 07:01  -  17 Jul 2019 07:00  --------------------------------------------------------  IN: 1318 mL / OUT: 2600 mL / NET: -1282 mL    17 Jul 2019 07:01  -  17 Jul 2019 08:02  --------------------------------------------------------  IN: 19 mL / OUT: 0 mL / NET: 19 mL        Physical Exam:  GENERAL: No acute distress, well-developed  HEAD:  Atraumatic, Normocephalic  ENT: EOMI, PERRLA, conjunctiva and sclera clear, Neck supple  CHEST/LUNG: Clear to auscultation bilaterally; No wheeze, equal breath sounds bilaterally   HEART: Regular rate and rhythm; No murmurs, rubs, or gallops  ABDOMEN: Soft, Nontender, Nondistended; Bowel sounds present  EXTREMITIES:  1+ edema  PSYCH: Nl behavior, nl affect  NEUROLOGY: AAOx3, non-focal, cranial nerves intact  SKIN: Normal color, No rashes or lesions                          10.2   7.6   )-----------( 273      ( 17 Jul 2019 04:51 )             31.3     07-17    139  |  99  |  20  ----------------------------<  129<H>  4.4   |  30  |  0.63    Ca    8.2<L>      17 Jul 2019 04:51  Phos  2.9     07-17  Mg     2.3     07-17    TPro  5.7<L>  /  Alb  3.2<L>  /  TBili  0.5  /  DBili  x   /  AST  72<H>  /  ALT  600<H>  /  AlkPhos  63  07-17    PTT - ( 17 Jul 2019 05:51 )  PTT:67.9 sec  CARDIAC MARKERS ( 12 Jul 2019 19:58 )  89 ng/L / x     / x     / 97 U/L / x     / 3.6 ng/mL  CARDIAC MARKERS ( 11 Jul 2019 22:27 )  x     / 0.426 ng/mL / x     / x     / x     / x      CARDIAC MARKERS ( 11 Jul 2019 10:42 )  x     / 0.265 ng/mL / x     / 50 U/L / x     / x      CARDIAC MARKERS ( 11 Jul 2019 07:31 )  x     / 0.207 ng/mL / x     / 42 U/L / x     / x      CARDIAC MARKERS ( 11 Jul 2019 04:17 )  x     / 0.025 ng/mL / x     / x     / x     / x          Serum Pro-Brain Natriuretic Peptide: 47685 pg/mL (07-12 @ 19:58)  Serum Pro-Brain Natriuretic Peptide: 3185 pg/mL (07-11 @ 04:17)      Cardiovascular Diagnostic Testing:    Echo: Personally reviewed:  7/13/19  LA 4.9  LVIDd 4.7  EF 30%  Conclusions:  1. Tethered mitral valve leaflets with normal opening.  Mitral annular calcification. Moderate mitral  regurgitation.  2. Severe segmental left ventricular systolic dysfunction.  Multiple  segments appears Dyssynchronous. EF 25-30%  3. Right ventricular enlargement with decreased right  ventricular systolic function.  4. Estimated right ventricular systolic pressure equals 62  mm Hg, assuming right atrial pressure equals 12 mm Hg,  consistent with severe pulmonary hypertension.  5. Normal tricuspid valve. moderate - Severe tricuspid  regurgitation.  6. Estimated pulmonary artery systolic pressure equals 62  mm Hg, assuming right atrial pressure equals 12 mm Hg,  consistent with severe pulmonary pressures.  *** No previous Echo exam.    Imaging:    CXR: Personally reviewed: small b/l pleural effusions      Interpretation of Telemetry: AF 100s Patient seen and examined at bedside.    Overnight Events: NAEO, resting comfortably this am.    REVIEW OF SYSTEMS:  CONSTITUTIONAL: No weakness, fevers or chills  EYES/ENT: No visual changes;  No dysphagia  NECK: No pain or stiffness  RESPIRATORY: + SOB, No cough, wheezing, hemoptysis  CARDIOVASCULAR: No chest pain or palpitations; + lower extremity edema  GASTROINTESTINAL: No abdominal or epigastric pain. No nausea, vomiting, or hematemesis; No diarrhea or constipation. No melena or hematochezia.  BACK: No back pain  GENITOURINARY: No dysuria, frequency or hematuria  NEUROLOGICAL: No numbness or weakness  SKIN: No itching, burning, rashes, or lesions     Current Meds:  amiodarone    Tablet 400 milliGRAM(s) Oral every 8 hours  amiodarone    Tablet   Oral   benzonatate 100 milliGRAM(s) Oral three times a day PRN  buDESOnide    EC Capsule 9 milliGRAM(s) Oral daily  chlorhexidine 2% Cloths 1 Application(s) Topical at bedtime  dextrose 40% Gel 15 Gram(s) Oral once PRN  dextrose 5%. 1000 milliLiter(s) IV Continuous <Continuous>  dextrose 50% Injectable 12.5 Gram(s) IV Push once  dextrose 50% Injectable 25 Gram(s) IV Push once  dextrose 50% Injectable 25 Gram(s) IV Push once  docusate sodium 100 milliGRAM(s) Oral two times a day  famotidine    Tablet 20 milliGRAM(s) Oral at bedtime  ferrous    sulfate 325 milliGRAM(s) Oral daily  furosemide    Tablet 40 milliGRAM(s) Oral daily  glucagon  Injectable 1 milliGRAM(s) IntraMuscular once PRN  guaiFENesin    Syrup 100 milliGRAM(s) Oral every 6 hours PRN  heparin  Infusion.  Unit(s)/Hr IV Continuous <Continuous>  heparin  Injectable 7500 Unit(s) IV Push every 6 hours PRN  heparin  Injectable 3500 Unit(s) IV Push every 6 hours PRN  insulin lispro (HumaLOG) corrective regimen sliding scale   SubCutaneous three times a day before meals  insulin lispro (HumaLOG) corrective regimen sliding scale   SubCutaneous at bedtime  losartan 25 milliGRAM(s) Oral two times a day  melatonin 3 milliGRAM(s) Oral at bedtime PRN  pantoprazole    Tablet 40 milliGRAM(s) Oral every 24 hours  senna 2 Tablet(s) Oral at bedtime  simethicone 80 milliGRAM(s) Chew every 6 hours PRN  spironolactone 25 milliGRAM(s) Oral daily      Vitals:  T(F): 98.2 (07-17), Max: 98.4 (07-16)  HR: 86 (07-17) (76 - 114)  BP: 116/63 (07-17) (94/66 - 125/66)  RR: 19 (07-17)  SpO2: 99% (07-17)  I&O's Summary    16 Jul 2019 07:01  -  17 Jul 2019 07:00  --------------------------------------------------------  IN: 1318 mL / OUT: 2600 mL / NET: -1282 mL    17 Jul 2019 07:01  -  17 Jul 2019 08:02  --------------------------------------------------------  IN: 19 mL / OUT: 0 mL / NET: 19 mL        Physical Exam:  GENERAL: No acute distress, well-developed  HEAD:  Atraumatic, Normocephalic  ENT: EOMI, PERRLA, conjunctiva and sclera clear, Neck supple, JVP 10-12cm  CHEST/LUNG: Clear to auscultation bilaterally; No wheeze, equal breath sounds bilaterally   HEART: irregularly irregular, No murmurs, rubs, or gallops  ABDOMEN: Soft, Nontender, Nondistended; Bowel sounds present  EXTREMITIES:  1+ edema  PSYCH: Nl behavior, nl affect  NEUROLOGY: AAOx3, non-focal, cranial nerves intact  SKIN: Normal color, No rashes or lesions                          10.2   7.6   )-----------( 273      ( 17 Jul 2019 04:51 )             31.3     07-17    139  |  99  |  20  ----------------------------<  129<H>  4.4   |  30  |  0.63    Ca    8.2<L>      17 Jul 2019 04:51  Phos  2.9     07-17  Mg     2.3     07-17    TPro  5.7<L>  /  Alb  3.2<L>  /  TBili  0.5  /  DBili  x   /  AST  72<H>  /  ALT  600<H>  /  AlkPhos  63  07-17    PTT - ( 17 Jul 2019 05:51 )  PTT:67.9 sec  CARDIAC MARKERS ( 12 Jul 2019 19:58 )  89 ng/L / x     / x     / 97 U/L / x     / 3.6 ng/mL  CARDIAC MARKERS ( 11 Jul 2019 22:27 )  x     / 0.426 ng/mL / x     / x     / x     / x      CARDIAC MARKERS ( 11 Jul 2019 10:42 )  x     / 0.265 ng/mL / x     / 50 U/L / x     / x      CARDIAC MARKERS ( 11 Jul 2019 07:31 )  x     / 0.207 ng/mL / x     / 42 U/L / x     / x      CARDIAC MARKERS ( 11 Jul 2019 04:17 )  x     / 0.025 ng/mL / x     / x     / x     / x          Serum Pro-Brain Natriuretic Peptide: 73103 pg/mL (07-12 @ 19:58)  Serum Pro-Brain Natriuretic Peptide: 3185 pg/mL (07-11 @ 04:17)      Cardiovascular Diagnostic Testing:    Echo: Personally reviewed:  7/13/19  LA 4.9  LVIDd 4.7  EF 30%  Conclusions:  1. Tethered mitral valve leaflets with normal opening.  Mitral annular calcification. Moderate mitral  regurgitation.  2. Severe segmental left ventricular systolic dysfunction.  Multiple  segments appears Dyssynchronous. EF 25-30%  3. Right ventricular enlargement with decreased right  ventricular systolic function.  4. Estimated right ventricular systolic pressure equals 62  mm Hg, assuming right atrial pressure equals 12 mm Hg,  consistent with severe pulmonary hypertension.  5. Normal tricuspid valve. moderate - Severe tricuspid  regurgitation.  6. Estimated pulmonary artery systolic pressure equals 62  mm Hg, assuming right atrial pressure equals 12 mm Hg,  consistent with severe pulmonary pressures.  *** No previous Echo exam.    Imaging:    CXR: Personally reviewed: small b/l pleural effusions      Interpretation of Telemetry: AF 100s

## 2019-07-17 NOTE — CHART NOTE - NSCHARTNOTEFT_GEN_A_CORE
CCU Transfer Note    Transfer from: CCU    Transfer to: (  ) Medicine    (  ) Telemetry    (  ) RCU                               (  ) Palliative    (  ) Stroke Unit    (  ) MICU    (  ) __________________    Accepting Physician:    Signout given to:     HPI / CCU COURSE:    78 year old woman with Afib on Xarelto, HTN presented to OSH for SOB now transferred to [facility] for newly diagnosed acute decompensated systolic heart failure.      At the OSH, patient was found to be in afib with RVR and was given IV cardizem 10mg.  Patient was also loaded with digoxin.  Patient's lactate was 9.8 and received 2L IVF bolus.  Patient had TTE done which showed EF 20-25%, mod MR, mod TR, mod diffuse hypokinesis of LV.  She had an echo done in 2017 which showed EF 55-60%. Patient was given a few doses of IV lasix 40mg (last dose 7/11 at 2PM) but was hypotensive at 7/12 at 2 AM to 75/54 and was given 1L bolus then also and SBP in the 90s.  Patient did not get diuresis afterwards.  Patient was never placed on pressor support.  Patient was also started on amio load. Patient presented with leukocytosis of 16-17 but was afebrile.  There was concern for HAP and patient was started on vanc/cefepime.  Blood cx from 07/11 were negative. Patient had a CT angio which was negative for PE but b/l pleural effusions and CT abd/pelvis which showed gallbladder wall thickening and colonic diverticulossi without diverticulitis.  Abd US was done which showed cholithiasis and severe diffuse gallbladder wall thickening.  Surgery was consulted and recommended HIDA scan but no surgical intervention.  Over the course of the hospital, patient developed an NORM (1.41 from 0.85 on admission) and shock liver (AST 4473, ALT 2724, from normal levels.) Lactate trended down to 3.1. Patient transferred to [facility] for possible candidate for invasive cardiac transport.     At [facility], patient in fluid overload state and currently on 2L NC.  History is significant for progressive SOB since 01/19.  Patient would take a few steps and become SOB.  Patient does see a cardiologist Dr. Cerda as outpatient and started her on furosemide 40mg three times a week. Per family, patient's other outpatient medications included valsartan, HCTZ, cardizem but patient does not have a list of her medications. Patient did not receive an echo since 2017 and has never had a LHC/RHC.  Currently, patient endorses nausea and acid reflux and bloating.  Patient also complains of cough.  Has mild SOB but denies any chest pain, lightheadedness, dizziness.     In CCU, patient       Vital Signs Last 24 Hrs  T(C): 36.9 (17 Jul 2019 11:00), Max: 36.9 (17 Jul 2019 11:00)  T(F): 98.4 (17 Jul 2019 11:00), Max: 98.4 (17 Jul 2019 11:00)  HR: 110 (17 Jul 2019 15:00) (76 - 114)  BP: 140/66 (17 Jul 2019 15:00) (87/49 - 140/66)  BP(mean): 99 (17 Jul 2019 15:00) (65 - 110)  RR: 20 (17 Jul 2019 15:00) (16 - 28)  SpO2: 99% (17 Jul 2019 15:00) (96% - 100%)    I&O's Summary    16 Jul 2019 07:01  -  17 Jul 2019 07:00  --------------------------------------------------------  IN: 1318 mL / OUT: 2600 mL / NET: -1282 mL    17 Jul 2019 07:01  -  17 Jul 2019 15:26  --------------------------------------------------------  IN: 383 mL / OUT: 450 mL / NET: -67 mL        Physical Exam:       LABS:                               10.2   7.6   )-----------( 273      ( 17 Jul 2019 04:51 )             31.3       07-17    139  |  99  |  20  ----------------------------<  129<H>  4.4   |  30  |  0.63    Ca    8.2<L>      17 Jul 2019 04:51  Phos  2.9     07-17  Mg     2.3     07-17    TPro  5.7<L>  /  Alb  3.2<L>  /  TBili  0.5  /  DBili  x   /  AST  72<H>  /  ALT  600<H>  /  AlkPhos  63  07-17      PTT - ( 17 Jul 2019 11:54 )  PTT:135.9 sec          ECG:    Telemetry:    Imaging:      ASSESSMENT & PLAN:           FOR FOLLOW UP:  [ ]   [ ]   [ ]     Fan Berger MD  Internal Medicine PGY-1 CCU Transfer Note    Transfer from: CCU    Transfer to: (  ) Medicine    (  ) Telemetry    (  ) RCU                               (  ) Palliative    (  ) Stroke Unit    (  ) MICU    (  ) __________________    Accepting Physician:    Signout given to:     HPI / CCU COURSE:    78 year old woman with Afib on Xarelto, HTN presented to OSH for SOB now transferred to [facility] for newly diagnosed acute decompensated systolic heart failure.      At the OSH, patient was found to be in afib with RVR and was given IV cardizem 10mg.  Patient was also loaded with digoxin.  Patient's lactate was 9.8 and received 2L IVF bolus.  Patient had TTE done which showed EF 20-25%, mod MR, mod TR, mod diffuse hypokinesis of LV.  She had an echo done in 2017 which showed EF 55-60%. Patient was given a few doses of IV lasix 40mg (last dose 7/11 at 2PM) but was hypotensive at 7/12 at 2 AM to 75/54 and was given 1L bolus then also and SBP in the 90s.  Patient did not get diuresis afterwards.  Patient was never placed on pressor support.  Patient was also started on amio load. Patient presented with leukocytosis of 16-17 but was afebrile.  There was concern for HAP and patient was started on vanc/cefepime.  Blood cx from 07/11 were negative. Patient had a CT angio which was negative for PE but b/l pleural effusions and CT abd/pelvis which showed gallbladder wall thickening and colonic diverticulosis without diverticulitis.  Abd US was done which showed cholithiasis and severe diffuse gallbladder wall thickening.  Surgery was consulted and recommended HIDA scan but no surgical intervention.  Over the course of the hospital, patient developed an NORM (1.41 from 0.85 on admission) and shock liver (AST 4473, ALT 2724, from normal levels.) Lactate trended down to 3.1. Patient transferred to [facility] for possible candidate for invasive cardiac transport.     At [facility], patient in fluid overload state and currently on 2L NC.  History is significant for progressive SOB since 01/19.  Patient would take a few steps and become SOB.  Patient does see a cardiologist Dr. Cerda as outpatient and started her on furosemide 40mg three times a week. Per family, patient's other outpatient medications included valsartan, HCTZ, cardizem but patient does not have a list of her medications. Patient did not receive an echo since 2017 and has never had a LHC/RHC.  Currently, patient endorses nausea and acid reflux and bloating.  Patient also complains of cough.  Has mild SOB but denies any chest pain, lightheadedness, dizziness.     In CCU, patient was diagnosed with ADHF and diuresed with IV lasix. Her 7/13 TTE showed severe segmental left ventricular systolic dysfunction. EF 25-30%, severe pulmonary hypertension, and moderate - Severe tricuspid. 7/15 R/L heart cath showed clear coronaries, but elevated R heart pressures. Aldactone started 7/16. Pt ultimately started on Entresto 7/17. She was ultimately switched to po lasix once more euvolemic. Regarding her afib, she was placed on a heparin drip with amio started 7/15. Decision was made to take to EP lab for AGNES/CVVA 7/20.       Vital Signs Last 24 Hrs  T(C): 36.9 (17 Jul 2019 11:00), Max: 36.9 (17 Jul 2019 11:00)  T(F): 98.4 (17 Jul 2019 11:00), Max: 98.4 (17 Jul 2019 11:00)  HR: 110 (17 Jul 2019 15:00) (76 - 114)  BP: 140/66 (17 Jul 2019 15:00) (87/49 - 140/66)  BP(mean): 99 (17 Jul 2019 15:00) (65 - 110)  RR: 20 (17 Jul 2019 15:00) (16 - 28)  SpO2: 99% (17 Jul 2019 15:00) (96% - 100%)    I&O's Summary    16 Jul 2019 07:01  -  17 Jul 2019 07:00  --------------------------------------------------------  IN: 1318 mL / OUT: 2600 mL / NET: -1282 mL    17 Jul 2019 07:01  -  17 Jul 2019 15:26  --------------------------------------------------------  IN: 383 mL / OUT: 450 mL / NET: -67 mL        Physical Exam:       LABS:                               10.2   7.6   )-----------( 273      ( 17 Jul 2019 04:51 )             31.3       07-17    139  |  99  |  20  ----------------------------<  129<H>  4.4   |  30  |  0.63    Ca    8.2<L>      17 Jul 2019 04:51  Phos  2.9     07-17  Mg     2.3     07-17    TPro  5.7<L>  /  Alb  3.2<L>  /  TBili  0.5  /  DBili  x   /  AST  72<H>  /  ALT  600<H>  /  AlkPhos  63  07-17      PTT - ( 17 Jul 2019 11:54 )  PTT:135.9 sec          ECG:    Telemetry:    Imaging:      ASSESSMENT & PLAN:     78F with hx Afib on xarelto, HTN, GERD who presented to OSH for SOB, transferred to [facility] for newly diagnosed systolic HF.     Neuro:  -no active issues    Cardiovascular:    1. Acute decompensated systolic heart failure  -TTE from OSH showed EF: 20-25%, mod MR, mod TR, mod diffuse hypokinesis of LV.  She had an echo done in 2017 which showed EF 55-60%. Unclear etiology ischemic vs non-ischemic  -c/w lasix 40mg po bid, strict I/Os (-1.2 L past 24 hours)  -holding BB in setting of ADHF, will restart once euvolemic  -losartan 25 mg po bid to be switched to Entresto 24-26 BID starting tonight, per HF recs  -spironolactone 25 mg po qd  -s/p LHC/RHC, showed RA 14, RV 44/21, PA 46/20, PCWP 25, LVEDP 27, and CO/CI 3.89/2.04    2. Afib   -CHADVASC: 5  -holding BB in setting of ADHF, elevated R heart pressures  -rhythm control with amio 400 mg po tid  -anticoag control with heparin gtt (will transition to xarelto when pt more stable)  -NPO at midnight for planned AGNES/DCCV tomorrow, EP on board      Pulmonary:  1. Hypoxic respiratory failure  - 2/2 acute decompensated heart failure  -RESOLVED w/ diuresis  -continue to monitor pulse ox  -cxr done yesterday, no significant findings  -c/w supplemental O2 as needed to maintain SpO2 > 92%       GI:    1. Congestive hepatopathy  -LFTs peaked at AST/ALT 4473/2724; 159/822 yesterday, 72/600 today  -currently trending down, will continue to monitor   -RUQ US performed; showed cholithiasis and severe diffuse gallbladder wall thickening. Surgery was consulted and recommended HIDA scan but no surgical intervention. Patient not actively having symptoms; will hold off on HIDA scan.    2. GERD  - c/w AM protonix and Famotidine qhs    3. Constipation  -bowel regimen: colace, senna, mylicon    Renal:  1. NORM  -most likely in the 2/2 of low flow state  -continue to monitor Cr, electrolytes   -currently normalized    ID:  -at OSH, was on vanc/cefepime for ?HAP  -blood cx 7/11: NGTD  -will continue to monitor off abx  -normalized    Heme:  -chronic anemia from iron-deficiency anemia  -H/H 9.7/30.6 yesterday, 10.2/31.3 today, will continue to monitor  -PTT kylee to 157 overnight, repeat draw 67.9  -c/w ferrous sulfate    Prophylactic measure:  DVT ppx: heparin drip             FOR FOLLOW UP:  [ ]   [ ]   [ ]     Fan Berger MD  Internal Medicine PGY-1 CCU Transfer Note    Transfer from: CCU    Transfer to: (x  ) Medicine    (  ) Telemetry    (  ) RCU                               (  ) Palliative    (  ) Stroke Unit    (  ) MICU    (  ) __________________    Accepting Physician: CHRYSTAL Landrum    Signout given to: CHRYSTAL Landrum    HPI / CCU COURSE:    78 year old woman with Afib on Xarelto, HTN presented to OSH for SOB now transferred to [facility] for newly diagnosed acute decompensated systolic heart failure.      At the OSH, patient was found to be in afib with RVR and was given IV cardizem 10mg.  Patient was also loaded with digoxin.  Patient's lactate was 9.8 and received 2L IVF bolus.  Patient had TTE done which showed EF 20-25%, mod MR, mod TR, mod diffuse hypokinesis of LV.  She had an echo done in 2017 which showed EF 55-60%. Patient was given a few doses of IV lasix 40mg (last dose 7/11 at 2PM) but was hypotensive at 7/12 at 2 AM to 75/54 and was given 1L bolus then also and SBP in the 90s.  Patient did not get diuresis afterwards.  Patient was never placed on pressor support.  Patient was also started on amio load. Patient presented with leukocytosis of 16-17 but was afebrile.  There was concern for HAP and patient was started on vanc/cefepime.  Blood cx from 07/11 were negative. Patient had a CT angio which was negative for PE but b/l pleural effusions and CT abd/pelvis which showed gallbladder wall thickening and colonic diverticulosis without diverticulitis.  Abd US was done which showed cholithiasis and severe diffuse gallbladder wall thickening.  Surgery was consulted and recommended HIDA scan but no surgical intervention.  Over the course of the hospital, patient developed an NORM (1.41 from 0.85 on admission) and shock liver (AST 4473, ALT 2724, from normal levels.) Lactate trended down to 3.1. Patient transferred to [facility] for possible candidate for invasive cardiac transport.     At [facility], patient in fluid overload state and currently on 2L NC.  History is significant for progressive SOB since 01/19.  Patient would take a few steps and become SOB.  Patient does see a cardiologist Dr. Cerda as outpatient and started her on furosemide 40mg three times a week. Per family, patient's other outpatient medications included valsartan, HCTZ, cardizem but patient does not have a list of her medications. Patient did not receive an echo since 2017 and has never had a LHC/RHC.  Currently, patient endorses nausea and acid reflux and bloating.  Patient also complains of cough.  Has mild SOB but denies any chest pain, lightheadedness, dizziness.     In CCU, patient was diagnosed with ADHF and diuresed with IV lasix. Her 7/13 TTE showed severe segmental left ventricular systolic dysfunction. EF 25-30%, severe pulmonary hypertension, and moderate - Severe tricuspid. 7/15 R/L heart cath showed clear coronaries, but elevated R heart pressures. Aldactone started 7/16. Pt ultimately started on Entresto 7/17. She was ultimately switched to po lasix once more euvolemic. Regarding her afib, she was placed on a heparin drip with amio started 7/15. Decision was made to take to EP lab for AGNES/CVVA 7/20.       Vital Signs Last 24 Hrs  T(C): 36.9 (17 Jul 2019 11:00), Max: 36.9 (17 Jul 2019 11:00)  T(F): 98.4 (17 Jul 2019 11:00), Max: 98.4 (17 Jul 2019 11:00)  HR: 110 (17 Jul 2019 15:00) (76 - 114)  BP: 140/66 (17 Jul 2019 15:00) (87/49 - 140/66)  BP(mean): 99 (17 Jul 2019 15:00) (65 - 110)  RR: 20 (17 Jul 2019 15:00) (16 - 28)  SpO2: 99% (17 Jul 2019 15:00) (96% - 100%)    I&O's Summary    16 Jul 2019 07:01  -  17 Jul 2019 07:00  --------------------------------------------------------  IN: 1318 mL / OUT: 2600 mL / NET: -1282 mL    17 Jul 2019 07:01  -  17 Jul 2019 15:26  --------------------------------------------------------  IN: 383 mL / OUT: 450 mL / NET: -67 mL        Physical Exam:       LABS:                               10.2   7.6   )-----------( 273      ( 17 Jul 2019 04:51 )             31.3       07-17    139  |  99  |  20  ----------------------------<  129<H>  4.4   |  30  |  0.63    Ca    8.2<L>      17 Jul 2019 04:51  Phos  2.9     07-17  Mg     2.3     07-17    TPro  5.7<L>  /  Alb  3.2<L>  /  TBili  0.5  /  DBili  x   /  AST  72<H>  /  ALT  600<H>  /  AlkPhos  63  07-17      PTT - ( 17 Jul 2019 11:54 )  PTT:135.9 sec          ECG:    Telemetry:    Imaging:      ASSESSMENT & PLAN:     78F with hx Afib on xarelto, HTN, GERD who presented to OSH for SOB, transferred to [facility] for newly diagnosed systolic HF.     Neuro:  -no active issues    Cardiovascular:    1. Acute decompensated systolic heart failure  -TTE from OSH showed EF: 20-25%, mod MR, mod TR, mod diffuse hypokinesis of LV.  She had an echo done in 2017 which showed EF 55-60%. Unclear etiology ischemic vs non-ischemic  -c/w lasix 40mg po bid, strict I/Os (-1.2 L past 24 hours)  -holding BB in setting of ADHF, will restart once euvolemic  -losartan 25 mg po bid to be switched to Entresto 24-26 BID starting tonight, per HF recs  -spironolactone 25 mg po qd  -s/p LHC/RHC, showed RA 14, RV 44/21, PA 46/20, PCWP 25, LVEDP 27, and CO/CI 3.89/2.04    2. Afib   -CHADVASC: 5  -holding BB in setting of ADHF, elevated R heart pressures  -rhythm control with amio 400 mg po tid  -anticoag control with heparin gtt (will transition to xarelto when pt more stable)  -NPO at midnight for planned AGNES/DCCV tomorrow, EP on board      Pulmonary:  1. Hypoxic respiratory failure  - 2/2 acute decompensated heart failure  -RESOLVED w/ diuresis  -continue to monitor pulse ox  -cxr done yesterday, no significant findings  -c/w supplemental O2 as needed to maintain SpO2 > 92%       GI:    1. Congestive hepatopathy  -LFTs peaked at AST/ALT 4473/2724; 159/822 yesterday, 72/600 today  -currently trending down, will continue to monitor   -RUQ US performed; showed cholithiasis and severe diffuse gallbladder wall thickening. Surgery was consulted and recommended HIDA scan but no surgical intervention. Patient not actively having symptoms; will hold off on HIDA scan.    2. GERD  - c/w AM protonix and Famotidine qhs    3. Constipation  -bowel regimen: colace, senna, mylicon    Renal:  1. NORM  -most likely in the 2/2 of low flow state  -continue to monitor Cr, electrolytes   -currently normalized    ID:  -at OSH, was on vanc/cefepime for ?HAP  -blood cx 7/11: NGTD  -will continue to monitor off abx  -normalized    Heme:  -chronic anemia from iron-deficiency anemia  -H/H 9.7/30.6 yesterday, 10.2/31.3 today, will continue to monitor  -PTT kylee to 157 overnight, repeat draw 67.9  -c/w ferrous sulfate    Prophylactic measure:  DVT ppx: heparin drip             FOR FOLLOW UP:  [ ] NPO at midnight for planned AGNES/DCCV tomorrow, EP on board  [ ] f/u on Heart Failure recs  [ ]     Fan Berger MD  Internal Medicine PGY-1

## 2019-07-18 DIAGNOSIS — J96.01 ACUTE RESPIRATORY FAILURE WITH HYPOXIA: ICD-10-CM

## 2019-07-18 DIAGNOSIS — D64.9 ANEMIA, UNSPECIFIED: ICD-10-CM

## 2019-07-18 DIAGNOSIS — R74.0 NONSPECIFIC ELEVATION OF LEVELS OF TRANSAMINASE AND LACTIC ACID DEHYDROGENASE [LDH]: ICD-10-CM

## 2019-07-18 DIAGNOSIS — I48.91 UNSPECIFIED ATRIAL FIBRILLATION: ICD-10-CM

## 2019-07-18 LAB
ALBUMIN SERPL ELPH-MCNC: 3.3 G/DL — SIGNIFICANT CHANGE UP (ref 3.3–5)
ALP SERPL-CCNC: 64 U/L — SIGNIFICANT CHANGE UP (ref 40–120)
ALT FLD-CCNC: 420 U/L — HIGH (ref 10–45)
ANION GAP SERPL CALC-SCNC: 14 MMOL/L — SIGNIFICANT CHANGE UP (ref 5–17)
APTT BLD: 61.8 SEC — HIGH (ref 27.5–36.3)
APTT BLD: 61.9 SEC — HIGH (ref 27.5–36.3)
AST SERPL-CCNC: 36 U/L — SIGNIFICANT CHANGE UP (ref 10–40)
BASOPHILS # BLD AUTO: 0.05 K/UL — SIGNIFICANT CHANGE UP (ref 0–0.2)
BASOPHILS NFR BLD AUTO: 0.7 % — SIGNIFICANT CHANGE UP (ref 0–2)
BILIRUB SERPL-MCNC: 0.5 MG/DL — SIGNIFICANT CHANGE UP (ref 0.2–1.2)
BUN SERPL-MCNC: 21 MG/DL — SIGNIFICANT CHANGE UP (ref 7–23)
CALCIUM SERPL-MCNC: 8.7 MG/DL — SIGNIFICANT CHANGE UP (ref 8.4–10.5)
CHLORIDE SERPL-SCNC: 101 MMOL/L — SIGNIFICANT CHANGE UP (ref 96–108)
CO2 SERPL-SCNC: 27 MMOL/L — SIGNIFICANT CHANGE UP (ref 22–31)
CREAT SERPL-MCNC: 0.6 MG/DL — SIGNIFICANT CHANGE UP (ref 0.5–1.3)
EOSINOPHIL # BLD AUTO: 0.21 K/UL — SIGNIFICANT CHANGE UP (ref 0–0.5)
EOSINOPHIL NFR BLD AUTO: 2.8 % — SIGNIFICANT CHANGE UP (ref 0–6)
GLUCOSE BLDC GLUCOMTR-MCNC: 124 MG/DL — HIGH (ref 70–99)
GLUCOSE BLDC GLUCOMTR-MCNC: 127 MG/DL — HIGH (ref 70–99)
GLUCOSE BLDC GLUCOMTR-MCNC: 144 MG/DL — HIGH (ref 70–99)
GLUCOSE BLDC GLUCOMTR-MCNC: 184 MG/DL — HIGH (ref 70–99)
GLUCOSE SERPL-MCNC: 141 MG/DL — HIGH (ref 70–99)
HCT VFR BLD CALC: 33.2 % — LOW (ref 34.5–45)
HGB BLD-MCNC: 9.6 G/DL — LOW (ref 11.5–15.5)
IMM GRANULOCYTES NFR BLD AUTO: 0.7 % — SIGNIFICANT CHANGE UP (ref 0–1.5)
INR BLD: 1.14 RATIO — SIGNIFICANT CHANGE UP (ref 0.88–1.16)
LYMPHOCYTES # BLD AUTO: 1.44 K/UL — SIGNIFICANT CHANGE UP (ref 1–3.3)
LYMPHOCYTES # BLD AUTO: 18.9 % — SIGNIFICANT CHANGE UP (ref 13–44)
MAGNESIUM SERPL-MCNC: 1.9 MG/DL — SIGNIFICANT CHANGE UP (ref 1.6–2.6)
MCHC RBC-ENTMCNC: 23.8 PG — LOW (ref 27–34)
MCHC RBC-ENTMCNC: 28.9 GM/DL — LOW (ref 32–36)
MCV RBC AUTO: 82.2 FL — SIGNIFICANT CHANGE UP (ref 80–100)
MONOCYTES # BLD AUTO: 1.08 K/UL — HIGH (ref 0–0.9)
MONOCYTES NFR BLD AUTO: 14.2 % — HIGH (ref 2–14)
NEUTROPHILS # BLD AUTO: 4.77 K/UL — SIGNIFICANT CHANGE UP (ref 1.8–7.4)
NEUTROPHILS NFR BLD AUTO: 62.7 % — SIGNIFICANT CHANGE UP (ref 43–77)
PHOSPHATE SERPL-MCNC: 3.7 MG/DL — SIGNIFICANT CHANGE UP (ref 2.5–4.5)
PLATELET # BLD AUTO: 308 K/UL — SIGNIFICANT CHANGE UP (ref 150–400)
POTASSIUM SERPL-MCNC: 3.7 MMOL/L — SIGNIFICANT CHANGE UP (ref 3.5–5.3)
POTASSIUM SERPL-SCNC: 3.7 MMOL/L — SIGNIFICANT CHANGE UP (ref 3.5–5.3)
PROT SERPL-MCNC: 6 G/DL — SIGNIFICANT CHANGE UP (ref 6–8.3)
PROTHROM AB SERPL-ACNC: 13 SEC — SIGNIFICANT CHANGE UP (ref 10–13.1)
RBC # BLD: 4.04 M/UL — SIGNIFICANT CHANGE UP (ref 3.8–5.2)
RBC # FLD: 18.1 % — HIGH (ref 10.3–14.5)
SODIUM SERPL-SCNC: 142 MMOL/L — SIGNIFICANT CHANGE UP (ref 135–145)
WBC # BLD: 7.6 K/UL — SIGNIFICANT CHANGE UP (ref 3.8–10.5)
WBC # FLD AUTO: 7.6 K/UL — SIGNIFICANT CHANGE UP (ref 3.8–10.5)

## 2019-07-18 PROCEDURE — 99233 SBSQ HOSP IP/OBS HIGH 50: CPT

## 2019-07-18 PROCEDURE — 93306 TTE W/DOPPLER COMPLETE: CPT | Mod: 26

## 2019-07-18 PROCEDURE — 93312 ECHO TRANSESOPHAGEAL: CPT | Mod: 26

## 2019-07-18 PROCEDURE — 93010 ELECTROCARDIOGRAM REPORT: CPT

## 2019-07-18 PROCEDURE — 93010 ELECTROCARDIOGRAM REPORT: CPT | Mod: 77

## 2019-07-18 RX ORDER — MAGNESIUM SULFATE 500 MG/ML
1 VIAL (ML) INJECTION ONCE
Refills: 0 | Status: COMPLETED | OUTPATIENT
Start: 2019-07-18 | End: 2019-07-18

## 2019-07-18 RX ORDER — SACUBITRIL AND VALSARTAN 24; 26 MG/1; MG/1
1 TABLET, FILM COATED ORAL
Qty: 60 | Refills: 0
Start: 2019-07-18 | End: 2019-08-16

## 2019-07-18 RX ORDER — POTASSIUM CHLORIDE 20 MEQ
20 PACKET (EA) ORAL ONCE
Refills: 0 | Status: COMPLETED | OUTPATIENT
Start: 2019-07-18 | End: 2019-07-18

## 2019-07-18 RX ORDER — RIVAROXABAN 15 MG-20MG
20 KIT ORAL
Refills: 0 | Status: DISCONTINUED | OUTPATIENT
Start: 2019-07-18 | End: 2019-07-19

## 2019-07-18 RX ADMIN — FAMOTIDINE 20 MILLIGRAM(S): 10 INJECTION INTRAVENOUS at 22:11

## 2019-07-18 RX ADMIN — CHLORHEXIDINE GLUCONATE 1 APPLICATION(S): 213 SOLUTION TOPICAL at 22:23

## 2019-07-18 RX ADMIN — PANTOPRAZOLE SODIUM 40 MILLIGRAM(S): 20 TABLET, DELAYED RELEASE ORAL at 06:20

## 2019-07-18 RX ADMIN — AMIODARONE HYDROCHLORIDE 400 MILLIGRAM(S): 400 TABLET ORAL at 06:20

## 2019-07-18 RX ADMIN — SPIRONOLACTONE 25 MILLIGRAM(S): 25 TABLET, FILM COATED ORAL at 06:20

## 2019-07-18 RX ADMIN — HEPARIN SODIUM 1300 UNIT(S)/HR: 5000 INJECTION INTRAVENOUS; SUBCUTANEOUS at 00:23

## 2019-07-18 RX ADMIN — HEPARIN SODIUM 1300 UNIT(S)/HR: 5000 INJECTION INTRAVENOUS; SUBCUTANEOUS at 10:14

## 2019-07-18 RX ADMIN — CHLORHEXIDINE GLUCONATE 1 APPLICATION(S): 213 SOLUTION TOPICAL at 07:42

## 2019-07-18 RX ADMIN — Medication 9 MILLIGRAM(S): at 06:20

## 2019-07-18 RX ADMIN — RIVAROXABAN 20 MILLIGRAM(S): KIT at 20:32

## 2019-07-18 RX ADMIN — AMIODARONE HYDROCHLORIDE 400 MILLIGRAM(S): 400 TABLET ORAL at 22:10

## 2019-07-18 RX ADMIN — SENNA PLUS 2 TABLET(S): 8.6 TABLET ORAL at 22:11

## 2019-07-18 RX ADMIN — SACUBITRIL AND VALSARTAN 1 TABLET(S): 24; 26 TABLET, FILM COATED ORAL at 19:04

## 2019-07-18 RX ADMIN — SACUBITRIL AND VALSARTAN 1 TABLET(S): 24; 26 TABLET, FILM COATED ORAL at 06:20

## 2019-07-18 RX ADMIN — HEPARIN SODIUM 1300 UNIT(S)/HR: 5000 INJECTION INTRAVENOUS; SUBCUTANEOUS at 19:03

## 2019-07-18 RX ADMIN — AMIODARONE HYDROCHLORIDE 400 MILLIGRAM(S): 400 TABLET ORAL at 14:26

## 2019-07-18 RX ADMIN — Medication 325 MILLIGRAM(S): at 14:25

## 2019-07-18 RX ADMIN — Medication 40 MILLIGRAM(S): at 06:20

## 2019-07-18 RX ADMIN — Medication 100 GRAM(S): at 14:26

## 2019-07-18 RX ADMIN — Medication 20 MILLIEQUIVALENT(S): at 14:25

## 2019-07-18 RX ADMIN — Medication 100 MILLIGRAM(S): at 06:20

## 2019-07-18 RX ADMIN — Medication 100 MILLIGRAM(S): at 19:04

## 2019-07-18 NOTE — PROGRESS NOTE ADULT - PROBLEM SELECTOR PLAN 1
Acute systolic (congestive) heart failure. Recommendation: TTE with LA 4.9, LVIDd 4.7, EF 30%, RV enlargement with decreased RV systolic function, PASP 62. TTE from 2017 with EF 55-60%, mod-sev MR.  RHC with RA 14, RV 44/5, PA 46/20/30, PCW 25, LVEDP 10, MAP 85, PA sat 43  CO/CI 3.18/1.66  SVR 1792  Is/Os 1.3/2.6L  - continue Entresto 24/26 BID  - continue aldactone 25mg PO daily, furosemide 40mg PO daily  - AGNES/DCCV today  - strict Is/Os  - daily weights  - 1L fluid restriction  - K > 4, Mg > 2.

## 2019-07-18 NOTE — PHARMACOTHERAPY INTERVENTION NOTE - COMMENTS
Pharmacy student Lili RIOS confirmed home medications with patient and pharmacy, updated in Outpatient Medication Review to facilitate discharge medication reconciliation in the future. Communicated with NP.    Valeria Merlos, PharmD   [phone]

## 2019-07-18 NOTE — PROGRESS NOTE ADULT - ASSESSMENT
78F with hx Afib on xarelto, HTN, GERD who presented to OSH for SOB, transferred to [facility] for newly diagnosed systolic HF.

## 2019-07-18 NOTE — PROGRESS NOTE ADULT - SUBJECTIVE AND OBJECTIVE BOX
Patient seen and examined at bedside.    Overnight Events:     REVIEW OF SYSTEMS:  CONSTITUTIONAL: No weakness, fevers or chills  EYES/ENT: No visual changes;  No dysphagia  NECK: No pain or stiffness  RESPIRATORY: + SOB, No cough, wheezing, hemoptysis  CARDIOVASCULAR: No chest pain or palpitations; + lower extremity edema  GASTROINTESTINAL: No abdominal or epigastric pain. No nausea, vomiting, or hematemesis; No diarrhea or constipation. No melena or hematochezia.  BACK: No back pain  GENITOURINARY: No dysuria, frequency or hematuria  NEUROLOGICAL: No numbness or weakness  SKIN: No itching, burning, rashes, or lesions       Current Meds:  amiodarone    Tablet 400 milliGRAM(s) Oral every 8 hours  amiodarone    Tablet   Oral   benzonatate 100 milliGRAM(s) Oral three times a day PRN  buDESOnide    EC Capsule 9 milliGRAM(s) Oral daily  chlorhexidine 2% Cloths 1 Application(s) Topical at bedtime  dextrose 40% Gel 15 Gram(s) Oral once PRN  dextrose 5%. 1000 milliLiter(s) IV Continuous <Continuous>  dextrose 50% Injectable 12.5 Gram(s) IV Push once  dextrose 50% Injectable 25 Gram(s) IV Push once  dextrose 50% Injectable 25 Gram(s) IV Push once  docusate sodium 100 milliGRAM(s) Oral two times a day  famotidine    Tablet 20 milliGRAM(s) Oral at bedtime  ferrous    sulfate 325 milliGRAM(s) Oral daily  furosemide    Tablet 40 milliGRAM(s) Oral daily  glucagon  Injectable 1 milliGRAM(s) IntraMuscular once PRN  guaiFENesin    Syrup 100 milliGRAM(s) Oral every 6 hours PRN  heparin  Infusion.  Unit(s)/Hr IV Continuous <Continuous>  heparin  Injectable 7500 Unit(s) IV Push every 6 hours PRN  heparin  Injectable 3500 Unit(s) IV Push every 6 hours PRN  insulin lispro (HumaLOG) corrective regimen sliding scale   SubCutaneous three times a day before meals  insulin lispro (HumaLOG) corrective regimen sliding scale   SubCutaneous at bedtime  melatonin 3 milliGRAM(s) Oral at bedtime PRN  pantoprazole    Tablet 40 milliGRAM(s) Oral every 24 hours  sacubitril 24 mG/valsartan 26 mG 1 Tablet(s) Oral two times a day  senna 2 Tablet(s) Oral at bedtime  simethicone 80 milliGRAM(s) Chew every 6 hours PRN  spironolactone 25 milliGRAM(s) Oral daily      Vitals:  T(F): 98.7 (-), Max: 98.7 ()  HR: 96 () (80 - 111)  BP: 120/65 () (87/49 - 140/66)  RR: 16 ()  SpO2: 96% ()  I&O's Summary    2019 07:01  -  2019 07:00  --------------------------------------------------------  IN: 722 mL / OUT: 1550 mL / NET: -828 mL        Physical Exam:  GENERAL: No acute distress, well-developed  HEAD:  Atraumatic, Normocephalic  ENT: EOMI, PERRLA, conjunctiva and sclera clear, Neck supple, JVP 10-12cm  CHEST/LUNG: Clear to auscultation bilaterally; No wheeze, equal breath sounds bilaterally   HEART: irregularly irregular, No murmurs, rubs, or gallops  ABDOMEN: Soft, Nontender, Nondistended; Bowel sounds present  EXTREMITIES:  1+ edema  PSYCH: Nl behavior, nl affect  NEUROLOGY: AAOx3, non-focal, cranial nerves intact  SKIN: Normal color, No rashes or lesions                                     10.2   7.6   )-----------( 273      ( 2019 04:51 )             31.3         139  |  99  |  20  ----------------------------<  129<H>  4.4   |  30  |  0.63    Ca    8.2<L>      2019 04:51  Phos  2.9       Mg     2.3         TPro  5.7<L>  /  Alb  3.2<L>  /  TBili  0.5  /  DBili  x   /  AST  72<H>  /  ALT  600<H>  /  AlkPhos  63      PTT - ( 2019 22:49 )  PTT:132.0 sec  CARDIAC MARKERS ( 2019 19:58 )  89 ng/L / x     / x     / 97 U/L / x     / 3.6 ng/mL  CARDIAC MARKERS ( 2019 22:27 )  x     / 0.426 ng/mL / x     / x     / x     / x      CARDIAC MARKERS ( 2019 10:42 )  x     / 0.265 ng/mL / x     / 50 U/L / x     / x      CARDIAC MARKERS ( 2019 07:31 )  x     / 0.207 ng/mL / x     / 42 U/L / x     / x          Serum Pro-Brain Natriuretic Peptide: 59746 pg/mL ( @ 19:58)      Cardiovascular Diagnostic Testing:    Echo: Personally reviewed:  19  LA 4.9  LVIDd 4.7  EF 30%  Conclusions:  1. Tethered mitral valve leaflets with normal opening.  Mitral annular calcification. Moderate mitral  regurgitation.  2. Severe segmental left ventricular systolic dysfunction.  Multiple  segments appears Dyssynchronous. EF 25-30%  3. Right ventricular enlargement with decreased right  ventricular systolic function.  4. Estimated right ventricular systolic pressure equals 62  mm Hg, assuming right atrial pressure equals 12 mm Hg,  consistent with severe pulmonary hypertension.  5. Normal tricuspid valve. moderate - Severe tricuspid  regurgitation.  6. Estimated pulmonary artery systolic pressure equals 62  mm Hg, assuming right atrial pressure equals 12 mm Hg,  consistent with severe pulmonary pressures.  *** No previous Echo exam.    Cath:  7/15/19  VENTRICLES: No left ventriculogram was performed.  CORONARY VESSELS: The coronary circulation is right dominant.  LM:   --  LM: Normal.  LAD:   --  LAD: Normal.  --  D1: Normal.  CX:   --  Circumflex: Normal.  --  OM1: Normal.  RCA:   --  RCA: Normal.  COMPLICATIONS: There were no complications.  DIAGNOSTIC IMPRESSIONS: High filling pressures. Normal coronary arteries.  DIAGNOSTIC RECOMMENDATIONS: Titrate HF meds.  Prepared and signed by  Harpal Barlow M.D.  Signed 2019 07:19:55  HEMODYNAMIC TABLES  Pressures:  Baseline  Pressures:  - HR: 62  Pressures:  - Rhythm:  Pressures:  -- Aortic Pressure (S/D/M): 115/66/85  Pressures:  -- Left Ventricle (s/edp): 115/27/--  Pressures:  -- Pulmonary Artery (S/D/M): 49/21/30  Pressures:  -- Pulmonary Capillary Wedge: 29/30/25  Pressures:  -- Right Atrium (a/v/M): 16/17/14  Pressures:  -- Right Ventricle (s/edp): 45/13/--  O2 Sats:  Baseline  O2 Sats:  - HR: 62  O2 Sats:  - Rhythm:  O2 Sats:  -- AO: 9.8/94.5/12.59  O2 Sats:  -- PA: 9.7/45.6/6.02  Outputs:  Baseline  Outputs:  -- CALCULATIONS[age]  Outputs:  -- CALCULATIONS: Body Surface Area: 1.91  Outputs:  -- CALCULATIONS: Height in cm: 155.00  Outputs:  -- CALCULATIONS: Sex: Female  Outputs:  -- CALCULATIONS: Weight in k.00  Outputs:  -- OUTPUTS: CO by Nash: 3.89  Outputs:  -- OUTPUTS: Nash cardiac index: 2.04  Outputs:  -- OUTPUTS: O2 consumption: 255.90  Outputs:  -- OUTPUTS:Vo2 Indexed: 134.00  Outputs:  -- RESISTANCES: Left ventricular stroke work: 33.41  Outputs:  -- RESISTANCES: Left Ventricular Stroke Work index: 17.49  Outputs:  -- RESISTANCES: Pulmonary vascular index (dsc): 196.35  Outputs:  -- RESISTANCES: Pulmonary vascular index (Wood Units): 2.45  Outputs:  -- RESISTANCES: Pulmonary vascular resistance (dsc): 102.82  Outputs:  -- RESISTANCES: Pulmonary vascular resistance (Wood Units): 1.29  Outputs:  -- RESISTANCES: PVR_SVR Ratio: 0.07  Outputs:  -- RESISTANCES: Right ventricular stroke work: 19.68  Outputs:  -- RESISTANCES: Right ventricular stroke work index: 10.31  Outputs:  -- RESISTANCES: Systemic vascular index (dsc): 2788.16  Outputs:  -- RESISTANCES: Systemic vascular index (Wood Units): 34.86  Outputs:  -- RESISTANCES: Systemic vascular resistance (dsc): 1460.01  Outputs:  -- RESISTANCES: Systemic vascular resistance (Wood Units): 18.25  Outputs:  -- RESISTANCES: Total pulmonary index (dsc): 1178.10  Outputs:  -- RESISTANCES: Total pulmonary index (Wood Units): 14.73  Outputs:  -- RESISTANCES: Total pulmonary resistance (dsc): 616.91  Outputs:  -- RESISTANCES: Total pulmonary resistance (Wood Units): 7.71  Outputs:  -- RESISTANCES: Total vascular index (Wood Units): 41.73  Outputs:  -- RESISTANCES: Total vascular resistance (dsc): 1747.90  Outputs:  -- RESISTANCES: Total vascular resistance (Wood Units): 21.85  Outputs:  -- RESISTANCES: Total vascular resistance index (dsc): 3337.94  Outputs:  -- RESISTANCES: TPR_TVR Ratio: 0.35  Outputs:  -- SHUNTS: Pulmonary flow: 3.89  Outputs:  -- SHUNTS: Qp Indexed: 2.04  Outputs:  -- SHUNTS: Qs Indexed: 2.04  Outputs:  -- SHUNTS: Systemic flow: 3.89    Imaging:    CXR: Personally reviewed: small b/l pleural effusions    Interpretation of Telemetry: Patient seen and examined at bedside.    Overnight Events: Feels improved this am, awaiting AGNES/DCCV.    REVIEW OF SYSTEMS:  CONSTITUTIONAL: No weakness, fevers or chills  EYES/ENT: No visual changes;  No dysphagia  NECK: No pain or stiffness  RESPIRATORY: + SOB, No cough, wheezing, hemoptysis  CARDIOVASCULAR: No chest pain or palpitations; + lower extremity edema  GASTROINTESTINAL: No abdominal or epigastric pain. No nausea, vomiting, or hematemesis; No diarrhea or constipation. No melena or hematochezia.  BACK: No back pain  GENITOURINARY: No dysuria, frequency or hematuria  NEUROLOGICAL: No numbness or weakness  SKIN: No itching, burning, rashes, or lesions       Current Meds:  amiodarone    Tablet 400 milliGRAM(s) Oral every 8 hours  amiodarone    Tablet   Oral   benzonatate 100 milliGRAM(s) Oral three times a day PRN  buDESOnide    EC Capsule 9 milliGRAM(s) Oral daily  chlorhexidine 2% Cloths 1 Application(s) Topical at bedtime  dextrose 40% Gel 15 Gram(s) Oral once PRN  dextrose 5%. 1000 milliLiter(s) IV Continuous <Continuous>  dextrose 50% Injectable 12.5 Gram(s) IV Push once  dextrose 50% Injectable 25 Gram(s) IV Push once  dextrose 50% Injectable 25 Gram(s) IV Push once  docusate sodium 100 milliGRAM(s) Oral two times a day  famotidine    Tablet 20 milliGRAM(s) Oral at bedtime  ferrous    sulfate 325 milliGRAM(s) Oral daily  furosemide    Tablet 40 milliGRAM(s) Oral daily  glucagon  Injectable 1 milliGRAM(s) IntraMuscular once PRN  guaiFENesin    Syrup 100 milliGRAM(s) Oral every 6 hours PRN  heparin  Infusion.  Unit(s)/Hr IV Continuous <Continuous>  heparin  Injectable 7500 Unit(s) IV Push every 6 hours PRN  heparin  Injectable 3500 Unit(s) IV Push every 6 hours PRN  insulin lispro (HumaLOG) corrective regimen sliding scale   SubCutaneous three times a day before meals  insulin lispro (HumaLOG) corrective regimen sliding scale   SubCutaneous at bedtime  melatonin 3 milliGRAM(s) Oral at bedtime PRN  pantoprazole    Tablet 40 milliGRAM(s) Oral every 24 hours  sacubitril 24 mG/valsartan 26 mG 1 Tablet(s) Oral two times a day  senna 2 Tablet(s) Oral at bedtime  simethicone 80 milliGRAM(s) Chew every 6 hours PRN  spironolactone 25 milliGRAM(s) Oral daily      Vitals:  T(F): 98.7 (), Max: 98.7 ()  HR: 96 () (80 - 111)  BP: 120/65 () (87/49 - 140/66)  RR: 16 ()  SpO2: 96% ()  I&O's Summary    2019 07:01  -  2019 07:00  --------------------------------------------------------  IN: 722 mL / OUT: 1550 mL / NET: -828 mL        Physical Exam:  GENERAL: No acute distress, well-developed  HEAD:  Atraumatic, Normocephalic  ENT: EOMI, PERRLA, conjunctiva and sclera clear, Neck supple, JVP 8cm  CHEST/LUNG: Clear to auscultation bilaterally; No wheeze, equal breath sounds bilaterally   HEART: irregularly irregular, No murmurs, rubs, or gallops  ABDOMEN: Soft, Nontender, Nondistended; Bowel sounds present  EXTREMITIES:  trace edema  PSYCH: Nl behavior, nl affect  NEUROLOGY: AAOx3, non-focal, cranial nerves intact  SKIN: Normal color, No rashes or lesions                                     10.2   7.6   )-----------( 273      ( 2019 04:51 )             31.3         139  |  99  |  20  ----------------------------<  129<H>  4.4   |  30  |  0.63    Ca    8.2<L>      2019 04:51  Phos  2.9       Mg     2.3         TPro  5.7<L>  /  Alb  3.2<L>  /  TBili  0.5  /  DBili  x   /  AST  72<H>  /  ALT  600<H>  /  AlkPhos  63  07-17    PTT - ( 2019 22:49 )  PTT:132.0 sec  CARDIAC MARKERS ( 2019 19:58 )  89 ng/L / x     / x     / 97 U/L / x     / 3.6 ng/mL  CARDIAC MARKERS ( 2019 22:27 )  x     / 0.426 ng/mL / x     / x     / x     / x      CARDIAC MARKERS ( 2019 10:42 )  x     / 0.265 ng/mL / x     / 50 U/L / x     / x      CARDIAC MARKERS ( 2019 07:31 )  x     / 0.207 ng/mL / x     / 42 U/L / x     / x          Serum Pro-Brain Natriuretic Peptide: 34539 pg/mL ( @ 19:58)      Cardiovascular Diagnostic Testing:    Echo: Personally reviewed:  19  LA 4.9  LVIDd 4.7  EF 30%  Conclusions:  1. Tethered mitral valve leaflets with normal opening.  Mitral annular calcification. Moderate mitral  regurgitation.  2. Severe segmental left ventricular systolic dysfunction.  Multiple  segments appears Dyssynchronous. EF 25-30%  3. Right ventricular enlargement with decreased right  ventricular systolic function.  4. Estimated right ventricular systolic pressure equals 62  mm Hg, assuming right atrial pressure equals 12 mm Hg,  consistent with severe pulmonary hypertension.  5. Normal tricuspid valve. moderate - Severe tricuspid  regurgitation.  6. Estimated pulmonary artery systolic pressure equals 62  mm Hg, assuming right atrial pressure equals 12 mm Hg,  consistent with severe pulmonary pressures.  *** No previous Echo exam.    Cath:  7/15/19  VENTRICLES: No left ventriculogram was performed.  CORONARY VESSELS: The coronary circulation is right dominant.  LM:   --  LM: Normal.  LAD:   --  LAD: Normal.  --  D1: Normal.  CX:   --  Circumflex: Normal.  --  OM1: Normal.  RCA:   --  RCA: Normal.  COMPLICATIONS: There were no complications.  DIAGNOSTIC IMPRESSIONS: High filling pressures. Normal coronary arteries.  DIAGNOSTIC RECOMMENDATIONS: Titrate HF meds.  Prepared and signed by  Harpal Barlow M.D.  Signed 2019 07:19:55  HEMODYNAMIC TABLES  Pressures:  Baseline  Pressures:  - HR: 62  Pressures:  - Rhythm:  Pressures:  -- Aortic Pressure (S/D/M): 115/66/85  Pressures:  -- Left Ventricle (s/edp): 115/27/--  Pressures:  -- Pulmonary Artery (S/D/M): 49/21/30  Pressures:  -- Pulmonary Capillary Wedge: 29/30/25  Pressures:  -- Right Atrium (a/v/M): 16/17/14  Pressures:  -- Right Ventricle (s/edp): 45/13/--  O2 Sats:  Baseline  O2 Sats:  - HR: 62  O2 Sats:  - Rhythm:  O2 Sats:  -- AO: 9.8/94.5/12.59  O2 Sats:  -- PA: 9.7/45.6/6.02  Outputs:  Baseline  Outputs:  -- CALCULATIONS[age]  Outputs:  -- CALCULATIONS: Body Surface Area: 1.91  Outputs:  -- CALCULATIONS: Height in cm: 155.00  Outputs:  -- CALCULATIONS: Sex: Female  Outputs:  -- CALCULATIONS: Weight in k.00  Outputs:  -- OUTPUTS: CO by Nash: 3.89  Outputs:  -- OUTPUTS: Nash cardiac index: 2.04  Outputs:  -- OUTPUTS: O2 consumption: 255.90  Outputs:  -- OUTPUTS:Vo2 Indexed: 134.00  Outputs:  -- RESISTANCES: Left ventricular stroke work: 33.41  Outputs:  -- RESISTANCES: Left Ventricular Stroke Work index: 17.49  Outputs:  -- RESISTANCES: Pulmonary vascular index (dsc): 196.35  Outputs:  -- RESISTANCES: Pulmonary vascular index (Wood Units): 2.45  Outputs:  -- RESISTANCES: Pulmonary vascular resistance (dsc): 102.82  Outputs:  -- RESISTANCES: Pulmonary vascular resistance (Wood Units): 1.29  Outputs:  -- RESISTANCES: PVR_SVR Ratio: 0.07  Outputs:  -- RESISTANCES: Right ventricular stroke work: 19.68  Outputs:  -- RESISTANCES: Right ventricular stroke work index: 10.31  Outputs:  -- RESISTANCES: Systemic vascular index (dsc): 2788.16  Outputs:  -- RESISTANCES: Systemic vascular index (Wood Units): 34.86  Outputs:  -- RESISTANCES: Systemic vascular resistance (dsc): 1460.01  Outputs:  -- RESISTANCES: Systemic vascular resistance (Wood Units): 18.25  Outputs:  -- RESISTANCES: Total pulmonary index (dsc): 1178.10  Outputs:  -- RESISTANCES: Total pulmonary index (Wood Units): 14.73  Outputs:  -- RESISTANCES: Total pulmonary resistance (dsc): 616.91  Outputs:  -- RESISTANCES: Total pulmonary resistance (Wood Units): 7.71  Outputs:  -- RESISTANCES: Total vascular index (Wood Units): 41.73  Outputs:  -- RESISTANCES: Total vascular resistance (dsc): 1747.90  Outputs:  -- RESISTANCES: Total vascular resistance (Wood Units): 21.85  Outputs:  -- RESISTANCES: Total vascular resistance index (dsc): 3337.94  Outputs:  -- RESISTANCES: TPR_TVR Ratio: 0.35  Outputs:  -- SHUNTS: Pulmonary flow: 3.89  Outputs:  -- SHUNTS: Qp Indexed: 2.04  Outputs:  -- SHUNTS: Qs Indexed: 2.04  Outputs:  -- SHUNTS: Systemic flow: 3.89    Imaging:    CXR: Personally reviewed: small b/l pleural effusions    Interpretation of Telemetry:

## 2019-07-18 NOTE — CHART NOTE - NSCHARTNOTEFT_GEN_A_CORE
Medicine PA Note  Critical Value Notification    Name: FRANCE ROBB  MRN: [id number]    Value: Notified by RN of a critical aPTT value of 132 sec.  Intervention: Follow nomogram and assess for bleeding. No bleeding identified at this time and rate decreased to 13mL/hr.  Follow-up: Repeat aPTT in 6 hours as per protocol. Continue to monitor for S/Sx of bleeding.    Will endorse to primary team in AM.    Bhupendra Moreno PA-C  Department of Medicine   [facility]  Spectra #[phone]

## 2019-07-18 NOTE — PHARMACOTHERAPY INTERVENTION NOTE - COMMENTS
Educated patient on heart failure, cardiology and other inpatient medications. Counseling on dosing, indication, side effects and monitoring. Heart failure booklet was provided and reviewed. Patient was provided with a medication cards for their new medication. Micromedex Med Essentials Fact Sheet for Entresto was provided to the patient and explained. Entresto coverage confirmed with pharmacy - $0 copay.     Silvia Gilbert, PharmD  PGY1 Pharmacy Practice Resident  [phone] Educated patient on heart failure, cardiology and other inpatient medications. Counseled on dosing, indications, possible side effects and monitoring for all medications. Heart failure booklet was provided and reviewed. Patient was provided with medication cards for applicable medications and Micromedex Med Essentials Fact Sheet for Entresto. Entresto coverage confirmed with pharmacy - $0 copay.     Silvia Gilbert, EliD  PGY1 Pharmacy Practice Resident  [phone]

## 2019-07-18 NOTE — PROGRESS NOTE ADULT - PROBLEM SELECTOR PLAN 3
CHADVASC: 5  -holding BB in setting of ADHF, elevated R heart pressures  -c/w amio load   -anticoag control with heparin gtt, pt refusing to start xarelto today states will like to start tomorrow.   -s/p AGNES/DCCV today

## 2019-07-18 NOTE — PROGRESS NOTE ADULT - PROBLEM SELECTOR PLAN 3
Had been on Xarelto at home, currently on heparin.  - continue amiodarone load  - plan for AGNES/DCCV today  - EP consult per primary team

## 2019-07-18 NOTE — PROGRESS NOTE ADULT - PROBLEM SELECTOR PLAN 1
TTE 7/13: EF 30%, OSH 20-25%  -c/w lasix 40mg po daily, strict I/Os , lost 8lb 24 hrs c/w daily weights  - 1L fluid restriction  - K > 4, Mg > 2.  -holding BB in setting of ADHF, will restart once euvolemic  -c/w Entresto 24-26 BID starting tonight, per HF recs  -c/w spironolactone 25 mg po qd  -s/p LHC/RHC, showed RA 14, RV 44/21, PA 46/20, PCWP 25, LVEDP 27, and CO/CI 3.89/2.04

## 2019-07-19 ENCOUNTER — TRANSCRIPTION ENCOUNTER (OUTPATIENT)
Age: 79
End: 2019-07-19

## 2019-07-19 ENCOUNTER — INBOUND DOCUMENT (OUTPATIENT)
Age: 79
End: 2019-07-19

## 2019-07-19 VITALS
HEART RATE: 72 BPM | DIASTOLIC BLOOD PRESSURE: 66 MMHG | RESPIRATION RATE: 18 BRPM | SYSTOLIC BLOOD PRESSURE: 100 MMHG | OXYGEN SATURATION: 97 % | TEMPERATURE: 98 F

## 2019-07-19 LAB
ALBUMIN SERPL ELPH-MCNC: 3.5 G/DL — SIGNIFICANT CHANGE UP (ref 3.3–5)
ALP SERPL-CCNC: 64 U/L — SIGNIFICANT CHANGE UP (ref 40–120)
ALT FLD-CCNC: 316 U/L — HIGH (ref 10–45)
ANION GAP SERPL CALC-SCNC: 13 MMOL/L — SIGNIFICANT CHANGE UP (ref 5–17)
AST SERPL-CCNC: 28 U/L — SIGNIFICANT CHANGE UP (ref 10–40)
BILIRUB SERPL-MCNC: 0.4 MG/DL — SIGNIFICANT CHANGE UP (ref 0.2–1.2)
BUN SERPL-MCNC: 18 MG/DL — SIGNIFICANT CHANGE UP (ref 7–23)
CALCIUM SERPL-MCNC: 8.8 MG/DL — SIGNIFICANT CHANGE UP (ref 8.4–10.5)
CHLORIDE SERPL-SCNC: 100 MMOL/L — SIGNIFICANT CHANGE UP (ref 96–108)
CO2 SERPL-SCNC: 26 MMOL/L — SIGNIFICANT CHANGE UP (ref 22–31)
CREAT SERPL-MCNC: 0.61 MG/DL — SIGNIFICANT CHANGE UP (ref 0.5–1.3)
GLUCOSE BLDC GLUCOMTR-MCNC: 192 MG/DL — HIGH (ref 70–99)
GLUCOSE SERPL-MCNC: 131 MG/DL — HIGH (ref 70–99)
HCT VFR BLD CALC: 34.8 % — SIGNIFICANT CHANGE UP (ref 34.5–45)
HGB BLD-MCNC: 10.1 G/DL — LOW (ref 11.5–15.5)
MAGNESIUM SERPL-MCNC: 2.1 MG/DL — SIGNIFICANT CHANGE UP (ref 1.6–2.6)
MCHC RBC-ENTMCNC: 24.1 PG — LOW (ref 27–34)
MCHC RBC-ENTMCNC: 29 GM/DL — LOW (ref 32–36)
MCV RBC AUTO: 83.1 FL — SIGNIFICANT CHANGE UP (ref 80–100)
PLATELET # BLD AUTO: 303 K/UL — SIGNIFICANT CHANGE UP (ref 150–400)
POTASSIUM SERPL-MCNC: 4.1 MMOL/L — SIGNIFICANT CHANGE UP (ref 3.5–5.3)
POTASSIUM SERPL-SCNC: 4.1 MMOL/L — SIGNIFICANT CHANGE UP (ref 3.5–5.3)
PROT SERPL-MCNC: 5.9 G/DL — LOW (ref 6–8.3)
RBC # BLD: 4.19 M/UL — SIGNIFICANT CHANGE UP (ref 3.8–5.2)
RBC # FLD: 18.4 % — HIGH (ref 10.3–14.5)
SODIUM SERPL-SCNC: 139 MMOL/L — SIGNIFICANT CHANGE UP (ref 135–145)
WBC # BLD: 7.8 K/UL — SIGNIFICANT CHANGE UP (ref 3.8–10.5)
WBC # FLD AUTO: 7.8 K/UL — SIGNIFICANT CHANGE UP (ref 3.8–10.5)

## 2019-07-19 PROCEDURE — 80162 ASSAY OF DIGOXIN TOTAL: CPT

## 2019-07-19 PROCEDURE — 80061 LIPID PANEL: CPT

## 2019-07-19 PROCEDURE — C1887: CPT

## 2019-07-19 PROCEDURE — 84436 ASSAY OF TOTAL THYROXINE: CPT

## 2019-07-19 PROCEDURE — 84484 ASSAY OF TROPONIN QUANT: CPT

## 2019-07-19 PROCEDURE — 87581 M.PNEUMON DNA AMP PROBE: CPT

## 2019-07-19 PROCEDURE — 93005 ELECTROCARDIOGRAM TRACING: CPT

## 2019-07-19 PROCEDURE — 82553 CREATINE MB FRACTION: CPT

## 2019-07-19 PROCEDURE — 80053 COMPREHEN METABOLIC PANEL: CPT

## 2019-07-19 PROCEDURE — 93312 ECHO TRANSESOPHAGEAL: CPT

## 2019-07-19 PROCEDURE — C1769: CPT

## 2019-07-19 PROCEDURE — 99233 SBSQ HOSP IP/OBS HIGH 50: CPT | Mod: GC

## 2019-07-19 PROCEDURE — 83735 ASSAY OF MAGNESIUM: CPT

## 2019-07-19 PROCEDURE — 82962 GLUCOSE BLOOD TEST: CPT

## 2019-07-19 PROCEDURE — 87486 CHLMYD PNEUM DNA AMP PROBE: CPT

## 2019-07-19 PROCEDURE — 86850 RBC ANTIBODY SCREEN: CPT

## 2019-07-19 PROCEDURE — 84443 ASSAY THYROID STIM HORMONE: CPT

## 2019-07-19 PROCEDURE — 99152 MOD SED SAME PHYS/QHP 5/>YRS: CPT

## 2019-07-19 PROCEDURE — 84100 ASSAY OF PHOSPHORUS: CPT

## 2019-07-19 PROCEDURE — 99239 HOSP IP/OBS DSCHRG MGMT >30: CPT

## 2019-07-19 PROCEDURE — 71045 X-RAY EXAM CHEST 1 VIEW: CPT

## 2019-07-19 PROCEDURE — 97161 PT EVAL LOW COMPLEX 20 MIN: CPT

## 2019-07-19 PROCEDURE — 87798 DETECT AGENT NOS DNA AMP: CPT

## 2019-07-19 PROCEDURE — 83880 ASSAY OF NATRIURETIC PEPTIDE: CPT

## 2019-07-19 PROCEDURE — 87633 RESP VIRUS 12-25 TARGETS: CPT

## 2019-07-19 PROCEDURE — 85027 COMPLETE CBC AUTOMATED: CPT

## 2019-07-19 PROCEDURE — 86900 BLOOD TYPING SEROLOGIC ABO: CPT

## 2019-07-19 PROCEDURE — 86901 BLOOD TYPING SEROLOGIC RH(D): CPT

## 2019-07-19 PROCEDURE — 85730 THROMBOPLASTIN TIME PARTIAL: CPT

## 2019-07-19 PROCEDURE — 83521 IG LIGHT CHAINS FREE EACH: CPT

## 2019-07-19 PROCEDURE — 93306 TTE W/DOPPLER COMPLETE: CPT

## 2019-07-19 PROCEDURE — 82550 ASSAY OF CK (CPK): CPT

## 2019-07-19 PROCEDURE — 83605 ASSAY OF LACTIC ACID: CPT

## 2019-07-19 PROCEDURE — 99153 MOD SED SAME PHYS/QHP EA: CPT

## 2019-07-19 PROCEDURE — 93460 R&L HRT ART/VENTRICLE ANGIO: CPT

## 2019-07-19 PROCEDURE — 85610 PROTHROMBIN TIME: CPT

## 2019-07-19 PROCEDURE — C1894: CPT

## 2019-07-19 RX ORDER — AMIODARONE HYDROCHLORIDE 400 MG/1
1 TABLET ORAL
Qty: 30 | Refills: 0
Start: 2019-07-19 | End: 2019-08-17

## 2019-07-19 RX ORDER — SACUBITRIL AND VALSARTAN 24; 26 MG/1; MG/1
1 TABLET, FILM COATED ORAL
Qty: 60 | Refills: 0
Start: 2019-07-19 | End: 2019-08-17

## 2019-07-19 RX ORDER — SACUBITRIL AND VALSARTAN 24; 26 MG/1; MG/1
1 TABLET, FILM COATED ORAL
Refills: 0 | Status: DISCONTINUED | OUTPATIENT
Start: 2019-07-19 | End: 2019-07-19

## 2019-07-19 RX ORDER — SPIRONOLACTONE 25 MG/1
12.5 TABLET, FILM COATED ORAL DAILY
Refills: 0 | Status: DISCONTINUED | OUTPATIENT
Start: 2019-07-19 | End: 2019-07-19

## 2019-07-19 RX ORDER — METOPROLOL TARTRATE 50 MG
0.5 TABLET ORAL
Qty: 15 | Refills: 0
Start: 2019-07-19 | End: 2019-08-17

## 2019-07-19 RX ORDER — METOPROLOL TARTRATE 50 MG
12.5 TABLET ORAL DAILY
Refills: 0 | Status: DISCONTINUED | OUTPATIENT
Start: 2019-07-19 | End: 2019-07-19

## 2019-07-19 RX ORDER — FUROSEMIDE 40 MG
20 TABLET ORAL DAILY
Refills: 0 | Status: DISCONTINUED | OUTPATIENT
Start: 2019-07-19 | End: 2019-07-19

## 2019-07-19 RX ORDER — FUROSEMIDE 40 MG
1 TABLET ORAL
Qty: 30 | Refills: 0
Start: 2019-07-19 | End: 2019-08-17

## 2019-07-19 RX ORDER — SPIRONOLACTONE 25 MG/1
0.5 TABLET, FILM COATED ORAL
Qty: 15 | Refills: 0
Start: 2019-07-19 | End: 2019-08-17

## 2019-07-19 RX ADMIN — Medication 9 MILLIGRAM(S): at 06:21

## 2019-07-19 RX ADMIN — Medication 12.5 MILLIGRAM(S): at 12:19

## 2019-07-19 RX ADMIN — Medication 40 MILLIGRAM(S): at 06:21

## 2019-07-19 RX ADMIN — AMIODARONE HYDROCHLORIDE 400 MILLIGRAM(S): 400 TABLET ORAL at 13:30

## 2019-07-19 RX ADMIN — AMIODARONE HYDROCHLORIDE 400 MILLIGRAM(S): 400 TABLET ORAL at 06:21

## 2019-07-19 RX ADMIN — SPIRONOLACTONE 25 MILLIGRAM(S): 25 TABLET, FILM COATED ORAL at 06:20

## 2019-07-19 RX ADMIN — Medication 1: at 12:18

## 2019-07-19 RX ADMIN — Medication 325 MILLIGRAM(S): at 13:30

## 2019-07-19 RX ADMIN — SACUBITRIL AND VALSARTAN 1 TABLET(S): 24; 26 TABLET, FILM COATED ORAL at 06:21

## 2019-07-19 RX ADMIN — Medication 100 MILLIGRAM(S): at 06:21

## 2019-07-19 RX ADMIN — PANTOPRAZOLE SODIUM 40 MILLIGRAM(S): 20 TABLET, DELAYED RELEASE ORAL at 06:21

## 2019-07-19 NOTE — PROGRESS NOTE ADULT - PROBLEM SELECTOR PLAN 2
Resolved, Likely 2/2 ADHF as above.  - continue diuresis and monitor BMPs.
2/2 acute decompensated heart failure resolved w/ diuresis  -continue to monitor pulse ox  -cxr results noted no sign findings   -saturating well on RA
2/2 acute decompensated heart failure resolved w/ diuresis  -continue to monitor pulse ox  -cxr results noted no sign findings   -saturating well on RA

## 2019-07-19 NOTE — DISCHARGE NOTE PROVIDER - PROVIDER TOKENS
PROVIDER:[TOKEN:[[id number]]],PROVIDER:[TOKEN:[[id number]]],PROVIDER:[TOKEN:[[id number]]]

## 2019-07-19 NOTE — DISCHARGE NOTE PROVIDER - CARE PROVIDER_API CALL
Colby Huddleston)  Hematology; Internal Medicine; Medical Oncology  [address] [place] [address]  Phone: [phone]  Fax: [phone]  Follow Up Time:     Yoni Garcia ([place] [address]  Phone: [phone]  Fax: [phone]  Follow Up Time:     Wale Dougherty; PhD)  Adv Heart Fail Trnsplnt Cardio; Cardiovascular Disease; Internal Medicine  [address] [place] [id number]  Phone: [phone]  Fax: [phone]  Follow Up Time:

## 2019-07-19 NOTE — PROGRESS NOTE ADULT - PROBLEM SELECTOR PLAN 3
CHADVASC: 5  - restart Toprol   -c/w amio load   -xarelto started today ; tolerating  -s/p AGNES/DCCV yesterday

## 2019-07-19 NOTE — DISCHARGE NOTE PROVIDER - NSDCCPCAREPLAN_GEN_ALL_CORE_FT
PRINCIPAL DISCHARGE DIAGNOSIS  Diagnosis: Systolic CHF, acute on chronic  Assessment and Plan of Treatment: Weigh yourself daily.  If you gain 3lbs in 3 days, or 5lbs in a week call your Health Care Provider.  Do not eat or drink foods containing more than 2000mg of salt (sodium) in your diet every day.  Call your Health Care Provider if you have any swelling or increased swelling in your feet, ankles, and/or stomach.  Take all of your medication as directed.  If you become dizzy call your Health Care Provider.  please keep all CHF appointments as directed  take all cardiac medications as directed  nutritionist gave written dietary reference material        SECONDARY DISCHARGE DIAGNOSES  Diagnosis: Elevated ALT measurement  Assessment and Plan of Treatment: Elevated ALT measurement  your primary care physician will follow up   ALT has been improving - last one @ 316  statin is being held - can be restarted when appropriate    Diagnosis: Atrial fibrillation with RVR  Assessment and Plan of Treatment: Atrial fibrillation with RVR  Atrial fibrillation is the most common heart rhythm problem & has the risk of stroke & heart attack  It helps if you control your blood pressure, not drink more than 1-2 alcohol drinks per day, cut down on caffeine, getting treatment for over active thyroid gland, & getting exercise  Call your doctor if you feel your heart racing or beating unusually, chest tightness or pain, lightheaded, faint, shortness of breath especially with exercise  It is important to take your heart medication as prescribed  You are on Xarelto for anticoagulation & stroke prevention  Xarelto/Rivaroxaban is used to thin the blood so clots will not form and to keep existing ones from getting bigger.  Take this medication daily as prescribed by your health care provider.  Take this medication with food to prevent upset stomach.  If you miss a dose call your health care provider or pharmacist right away.  Tell your doctor you use this drug before you have a spinal or epidural procedure  Tell dentists, surgeon, and other doctors that you use this drug.  You may bleed more easily.  Be careful and avoid injury.  Use a soft toothbrush and an electric razor.      Diagnosis: Anemia  Assessment and Plan of Treatment: Anemia  your endosxopy & colonscopy was negative & you received blood transfusion during this hospitaltization  take iron supplement as directed w/ Vitamin C for improved absorption

## 2019-07-19 NOTE — PROGRESS NOTE ADULT - ATTENDING COMMENTS
Patient is seen and examined with fellow, NP and the CCU house-staff. I agree with the history, physical and the assessment and plan.  acute systolic heart failure  c/w diuresis  on afterload reduction  avoid BB at this time  plan for R/L heart cath
Patient is seen and examined with fellow, NP and the CCU house-staff. I agree with the history, physical and the assessment and plan.  new acute systolic heart failure   c/w aggressive diuresis  will start low dose hydralazine for afterload reduction  once euvolemic will pursue an ischemic evaluation
Patient is seen and examined with fellow, NP and the CCU house-staff. I agree with the history, physical and the assessment and plan.  high filling pressures with low flow state  - with continuous diuresis  amiodarone was started fr episodes of afb wth rvr  once euvolemic will start BB  increase the losartan for afterload reduction  can dg load for better rate control
Appreciate Heart Failure team recommendations for new systolic heart failure.  EP for AGNES/DCCV.
Patient is seen and examined with fellow, NP and the CCU house-staff. I agree with the history, physical and the assessment and plan.  acute systolic heart failure  monitor urine output  r/l heart cath today
She feels wonderful and is anxious to go home. She has tolerated low dose Entresto without an issue and currently is normotensive and euvolemic.  Please increase Entresto to 49-51 mg BID starting at home tonight. I gave her daughter a 30-day coupon card in case authorization of the medication is an issue on the weekend.    Reduce lasix to 20 mg po daily and spironolactone to 12.5 mg po daily.  Add Toprol XL 12.5 mg daily - 1st dose now.  OK for discharge home.  Follow-up with HF NP next week and with me in 3 weeks.
She has done well with medical therapy and feels good. JVP is higher today, but she is actively diuresing since the oral dose of lasix this morning.   Serum free light chains were normal.    Please switch the losartan to Entresto 24-26 BID starting tonight.  Continue lasix 40 mg po daily and spironolactone 25 mg po daily.  OK to move to [facility] HF Care Model team.  Would consult EP for possible AGNES/DCCV tomorrow.  Anticipate discharge by Friday.

## 2019-07-19 NOTE — CHART NOTE - NSCHARTNOTESELECT_GEN_ALL_CORE
MAR Accept Note/Transfer Note
Event Note/aPTT
Event Note/mn rounds
Event Note/sheaths
Nutrition Services
Transfer Note
mn rounds/Event Note
mn rounds/Event Note

## 2019-07-19 NOTE — DISCHARGE NOTE PROVIDER - CARE PROVIDERS DIRECT ADDRESSES
,[email or web address]

## 2019-07-19 NOTE — DISCHARGE NOTE PROVIDER - HOSPITAL COURSE
acute on chronic systolic CHF w/ EF 20-25%, nonischemic since cardiac cath w/ normal coronary arteries, atrial fibrillation w/ success cardioversion on 7/18, increased liver enzyme(ALT) due to shock liver - statin held & lab value improving slowly, anemia with negative endoscopy/colonoscopy but was on NSAID w/ Xarelto        78F with hx Afib on xarelto, HTN, GERD who presented to OSH for SOB, transferred to [facility] for newly diagnosed systolic HF.          Problem/Plan - 1:    ·  Problem: Acute systolic (congestive) heart failure.  Plan: TTE 7/13: EF 30%, OSH 20-25%    - c/w lasix 40mg po daily,    - 1L fluid restriction    - restart Toprol now that pt is euvolemic    -c/w Entresto 24-26 BID  per HF recs    -c/w spironolactone 25 mg po qd    -s/p LHC/RHC, showed RA 14, RV 44/21, PA 46/20, PCWP 25, LVEDP 27, and CO/CI 3.89/2.04    - Pt is clinically stable for discharge to f/up with Cardiology within 1 week.          Problem/Plan - 2:    ·  Problem: Acute respiratory failure with hypoxia.  Plan: 2/2 acute decompensated heart failure resolved w/ diuresis    -continue to monitor pulse ox    -cxr results noted no sign findings     -saturating well on RA.          Problem/Plan - 3:    ·  Problem: Atrial fibrillation with RVR.  Plan: CHADVASC: 5    - restart Toprol     -c/w amio load     -xarelto started 7/18; tolerating    -s/p AGNES/DCCV on7/18 successfully.          Problem/Plan - 4:    ·  Problem: Essential hypertension.  Plan: well controlled on above meds.          Problem/Plan - 5:    ·  Problem: NORM (acute kidney injury).  Plan: most likely in the 2/2 decompensated HF     - now resolved.          Problem/Plan - 6:    Problem: Elevated ALT measurement. Plan: LFTs peaked at AST/ALT 4473/2724; 159/822 yesterday, 72/600 today    -currently trending down, will continue to monitor - statin held & can be evaluated as outpatien    now downtrending     - per review of OSH records pt noted to have liver shock due to decompensated RHF    -RUQ US performed at OSH showed cholithiasis and severe diffuse gallbladder wall thickening. Surgery was consulted and recommended HIDA scan but no surgical intervention.     - Patient currently w/o s/s of acute cholecystitis, no leukocytosis, no abd pain, afebrile, tolerating PO well and ALT downtrending will hold off on HIDA scan.         Problem/Plan - 7:    ·  Problem: Anemia.  Plan: iron studies consistent with  iron-deficiency anemia    - recently presented to OSH due to fatigue noted to have Hb 7, s/p 1 units PRBC s/p EGD and colposcopy recently without any active GI bleed.      - hematology f/up as out pt    - Hb currently stable at 10. 1 w/o s/s of active bleed.     - c/w ferrous sulfate.

## 2019-07-19 NOTE — PROGRESS NOTE ADULT - PROBLEM SELECTOR PLAN 7
likely iron-deficiency anemia, consistent with Iron studies   - of note pt and dtr state they presented to OSH due to fatigue noted to have Hb 7, s/p 1 units PRBC per Dtr and pt: she is s/p EGD and colposcopy recently without any active GI bleed.    - requesting Heme/onc f/u, agree pt can follow up with hematology out pt  - Hb stable at 9.6 w/o s/s of active bleed.   - c/w ferrous sulfate
iron studies consistent with  iron-deficiency anemia  - recently presented to OSH due to fatigue noted to have Hb 7, s/p 1 units PRBC s/p EGD and colposcopy recently without any active GI bleed.    - hematology f/up as out pt  - Hb currently stable at 10. 1 w/o s/s of active bleed.   - c/w ferrous sulfate

## 2019-07-19 NOTE — DISCHARGE NOTE NURSING/CASE MANAGEMENT/SOCIAL WORK - NSDCDPATPORTLINK_GEN_ALL_CORE
You can access the [facility] Patient Portal, offered by [facility], by registering with the following website: [email or web address]

## 2019-07-19 NOTE — DISCHARGE NOTE PROVIDER - NSDCFUADDAPPT_GEN_ALL_CORE_FT
follow up with primary care physician within one week after discharge  follow up with outpatient hematology for further anemia workup with [facility]  - Dr. Huddleston is one of many physician in the group  follow up with CHF clinic Dr. Dougherty on 8/21 @ 10:30 AM  CHF clinic will be calling you with appointment on 7/29 with nurse practitioner

## 2019-07-19 NOTE — DISCHARGE NOTE PROVIDER - REASON FOR ADMISSION
acute on chronic systolic CHF w/ EF 20-25%, nonischemic since cardiac cath w/ normal coronary arteries, atrial fibrillation w/ success cardioversion on 7/18, increased liver enzyme(ALT) due to shock liver - statin held & lab value improving slowly, anemia with negative endoscopy/colonoscopy but was on NSAID w/ Xarelto

## 2019-07-19 NOTE — PROGRESS NOTE ADULT - SUBJECTIVE AND OBJECTIVE BOX
Patient is a 78y old  Female who presents with a chief complaint of Systolic HF (17 Jul 2019 07:21)      SUBJECTIVE / OVERNIGHT EVENTS:    MEDICATIONS  (STANDING):  amiodarone    Tablet 400 milliGRAM(s) Oral every 8 hours  amiodarone    Tablet 200 milliGRAM(s) Oral daily  amiodarone    Tablet   Oral   buDESOnide    EC Capsule 9 milliGRAM(s) Oral daily  chlorhexidine 2% Cloths 1 Application(s) Topical at bedtime  dextrose 5%. 1000 milliLiter(s) (50 mL/Hr) IV Continuous <Continuous>  dextrose 50% Injectable 12.5 Gram(s) IV Push once  dextrose 50% Injectable 25 Gram(s) IV Push once  dextrose 50% Injectable 25 Gram(s) IV Push once  docusate sodium 100 milliGRAM(s) Oral two times a day  famotidine    Tablet 20 milliGRAM(s) Oral at bedtime  ferrous    sulfate 325 milliGRAM(s) Oral daily  furosemide    Tablet 40 milliGRAM(s) Oral daily  insulin lispro (HumaLOG) corrective regimen sliding scale   SubCutaneous three times a day before meals  insulin lispro (HumaLOG) corrective regimen sliding scale   SubCutaneous at bedtime  pantoprazole    Tablet 40 milliGRAM(s) Oral every 24 hours  rivaroxaban 20 milliGRAM(s) Oral with dinner  sacubitril 24 mG/valsartan 26 mG 1 Tablet(s) Oral two times a day  senna 2 Tablet(s) Oral at bedtime  spironolactone 25 milliGRAM(s) Oral daily    MEDICATIONS  (PRN):  benzonatate 100 milliGRAM(s) Oral three times a day PRN Cough  dextrose 40% Gel 15 Gram(s) Oral once PRN Blood Glucose LESS THAN 70 milliGRAM(s)/deciliter  glucagon  Injectable 1 milliGRAM(s) IntraMuscular once PRN Glucose LESS THAN 70 milligrams/deciliter  guaiFENesin    Syrup 100 milliGRAM(s) Oral every 6 hours PRN Cough  melatonin 3 milliGRAM(s) Oral at bedtime PRN Insomnia  simethicone 80 milliGRAM(s) Chew every 6 hours PRN Indigestion      Vital Signs Last 24 Hrs  T(C): 36.7 (18 Jul 2019 20:00), Max: 36.9 (18 Jul 2019 14:20)  T(F): 98.1 (18 Jul 2019 20:00), Max: 98.5 (18 Jul 2019 14:20)  HR: 76 (19 Jul 2019 06:19) (68 - 76)  BP: 124/72 (19 Jul 2019 06:19) (89/51 - 137/68)  BP(mean): --  RR: 18 (18 Jul 2019 20:00) (18 - 18)  SpO2: 97% (18 Jul 2019 20:00) (97% - 97%)  CAPILLARY BLOOD GLUCOSE      POCT Blood Glucose.: 184 mg/dL (18 Jul 2019 21:12)  POCT Blood Glucose.: 127 mg/dL (18 Jul 2019 18:56)  POCT Blood Glucose.: 124 mg/dL (18 Jul 2019 13:55)    I&O's Summary    18 Jul 2019 07:01  -  19 Jul 2019 07:00  --------------------------------------------------------  IN: 456 mL / OUT: 2000 mL / NET: -1544 mL    19 Jul 2019 07:01  -  19 Jul 2019 11:13  --------------------------------------------------------  IN: 0 mL / OUT: 625 mL / NET: -625 mL        PHYSICAL EXAM:  GENERAL: NAD, well-developed  HEAD:  Atraumatic, Normocephalic  EYES: EOMI, PERRLA, conjunctiva and sclera clear  NECK: Supple, No JVD  CHEST/LUNG: Clear to auscultation bilaterally; No wheeze  HEART: Regular rate and rhythm; No murmurs, rubs, or gallops  ABDOMEN: Soft, Nontender, Nondistended; Bowel sounds present  EXTREMITIES:  2+ Peripheral Pulses, No clubbing, cyanosis, or edema  PSYCH: AAOx3  NEUROLOGY: non-focal  SKIN: No rashes or lesions    LABS:                        10.1   7.80  )-----------( 303      ( 19 Jul 2019 08:29 )             34.8     07-19    139  |  100  |  18  ----------------------------<  131<H>  4.1   |  26  |  0.61    Ca    8.8      19 Jul 2019 06:45  Phos  3.7     07-18  Mg     2.1     07-19    TPro  5.9<L>  /  Alb  3.5  /  TBili  0.4  /  DBili  x   /  AST  28  /  ALT  316<H>  /  AlkPhos  64  07-19    PT/INR - ( 18 Jul 2019 09:30 )   PT: 13.0 sec;   INR: 1.14 ratio         PTT - ( 18 Jul 2019 17:23 )  PTT:61.9 sec          RADIOLOGY & ADDITIONAL TESTS:    Imaging Personally Reviewed:    Consultant(s) Notes Reviewed:      Care Discussed with Consultants/Other Providers: Patient is a 78y old  Female who presents with a chief complaint of Systolic HF (17 Jul 2019 07:21)      SUBJECTIVE / OVERNIGHT EVENTS:  Pt seen and examined. No acute events overnight. Denies CP, sob. Notes improvement in LE swelling. Pt started on Xarelto.    MEDICATIONS  (STANDING):  amiodarone    Tablet 400 milliGRAM(s) Oral every 8 hours  amiodarone    Tablet 200 milliGRAM(s) Oral daily  amiodarone    Tablet   Oral   buDESOnide    EC Capsule 9 milliGRAM(s) Oral daily  chlorhexidine 2% Cloths 1 Application(s) Topical at bedtime  dextrose 5%. 1000 milliLiter(s) (50 mL/Hr) IV Continuous <Continuous>  dextrose 50% Injectable 12.5 Gram(s) IV Push once  dextrose 50% Injectable 25 Gram(s) IV Push once  dextrose 50% Injectable 25 Gram(s) IV Push once  docusate sodium 100 milliGRAM(s) Oral two times a day  famotidine    Tablet 20 milliGRAM(s) Oral at bedtime  ferrous    sulfate 325 milliGRAM(s) Oral daily  furosemide    Tablet 40 milliGRAM(s) Oral daily  insulin lispro (HumaLOG) corrective regimen sliding scale   SubCutaneous three times a day before meals  insulin lispro (HumaLOG) corrective regimen sliding scale   SubCutaneous at bedtime  pantoprazole    Tablet 40 milliGRAM(s) Oral every 24 hours  rivaroxaban 20 milliGRAM(s) Oral with dinner  sacubitril 24 mG/valsartan 26 mG 1 Tablet(s) Oral two times a day  senna 2 Tablet(s) Oral at bedtime  spironolactone 25 milliGRAM(s) Oral daily    MEDICATIONS  (PRN):  benzonatate 100 milliGRAM(s) Oral three times a day PRN Cough  dextrose 40% Gel 15 Gram(s) Oral once PRN Blood Glucose LESS THAN 70 milliGRAM(s)/deciliter  glucagon  Injectable 1 milliGRAM(s) IntraMuscular once PRN Glucose LESS THAN 70 milligrams/deciliter  guaiFENesin    Syrup 100 milliGRAM(s) Oral every 6 hours PRN Cough  melatonin 3 milliGRAM(s) Oral at bedtime PRN Insomnia  simethicone 80 milliGRAM(s) Chew every 6 hours PRN Indigestion      Vital Signs Last 24 Hrs  T(C): 36.7 (18 Jul 2019 20:00), Max: 36.9 (18 Jul 2019 14:20)  T(F): 98.1 (18 Jul 2019 20:00), Max: 98.5 (18 Jul 2019 14:20)  HR: 76 (19 Jul 2019 06:19) (68 - 76)  BP: 124/72 (19 Jul 2019 06:19) (89/51 - 137/68)  BP(mean): --  RR: 18 (18 Jul 2019 20:00) (18 - 18)  SpO2: 97% (18 Jul 2019 20:00) (97% - 97%)  CAPILLARY BLOOD GLUCOSE      POCT Blood Glucose.: 184 mg/dL (18 Jul 2019 21:12)  POCT Blood Glucose.: 127 mg/dL (18 Jul 2019 18:56)  POCT Blood Glucose.: 124 mg/dL (18 Jul 2019 13:55)    I&O's Summary    18 Jul 2019 07:01  -  19 Jul 2019 07:00  --------------------------------------------------------  IN: 456 mL / OUT: 2000 mL / NET: -1544 mL    19 Jul 2019 07:01  -  19 Jul 2019 11:13  --------------------------------------------------------  IN: 0 mL / OUT: 625 mL / NET: -625 mL        PHYSICAL EXAM:  GENERAL: NAD, anicteric, afebrile  HEAD:  Atraumatic, Normocephalic  EYES: EOMI, PERRLA, conjunctiva and sclera clear  NECK: Supple, No JVD  CHEST/LUNG: Clear to auscultation bilaterally; No wheeze  HEART: Regular rate and rhythm; No murmurs, rubs, or gallops  ABDOMEN: Soft, Nontender, Nondistended; Bowel sounds present  EXTREMITIES:  trace b/l LE edema  PSYCH: AAOx3  NEUROLOGY: non-focal  SKIN: No rashes or lesions    LABS:                        10.1   7.80  )-----------( 303      ( 19 Jul 2019 08:29 )             34.8     07-19    139  |  100  |  18  ----------------------------<  131<H>  4.1   |  26  |  0.61    Ca    8.8      19 Jul 2019 06:45  Phos  3.7     07-18  Mg     2.1     07-19    TPro  5.9<L>  /  Alb  3.5  /  TBili  0.4  /  DBili  x   /  AST  28  /  ALT  316<H>  /  AlkPhos  64  07-19    PT/INR - ( 18 Jul 2019 09:30 )   PT: 13.0 sec;   INR: 1.14 ratio         PTT - ( 18 Jul 2019 17:23 )  PTT:61.9 sec

## 2019-07-19 NOTE — PROGRESS NOTE ADULT - ASSESSMENT
78F with hx Afib on xarelto, HTN, GERD who presented to OSH for SOB, transferred to [facility] for newly diagnosed systolic HF.

## 2019-07-19 NOTE — PROGRESS NOTE ADULT - PROBLEM SELECTOR PROBLEM 3
Atrial fibrillation with RVR
Atrial fibrillation, unspecified type
Atrial fibrillation with RVR

## 2019-07-19 NOTE — PROGRESS NOTE ADULT - PROBLEM SELECTOR PLAN 4
- well controlled on current therapies.
well controlled on above meds
well controlled on above meds

## 2019-07-19 NOTE — PROGRESS NOTE ADULT - PROBLEM SELECTOR PROBLEM 2
Acute respiratory failure with hypoxia
NOMR (acute kidney injury)
NORM (acute kidney injury)
NORM (acute kidney injury)
Acute respiratory failure with hypoxia

## 2019-07-19 NOTE — PROGRESS NOTE ADULT - ASSESSMENT
77 yo F with a PMH significant for AF on Xarelto, HTN who presented to [facility] with SOB, found to be overloaded and in AF with RVR. She was found to be in ADHF 2/2 newly depressed EF(EF 30%) Hospital course there complicated by hypotension, NORM, and elevated transaminases so she was transferred to [facility] for further care. While in the CCU, she has been diuresed with IV lasix and responded well. 79 yo F with a PMH significant for AF on Xarelto, HTN who presented to [facility] with SOB, found to be overloaded and in AF with RVR. She was found to be in ADHF 2/2 newly depressed EF(EF 30%) Hospital course there complicated by hypotension, NORM, and elevated transaminases so she was transferred to [facility] for further care. While in the CCU, she has been diuresed with IV lasix and responded well. She was transferred to [facility] and underwent successful AGNES/DCCV on 7/18.

## 2019-07-19 NOTE — PROGRESS NOTE ADULT - PROBLEM SELECTOR PLAN 1
Is/Os not well recorded  - continue Entresto 24/26 BID, aldactone 25mg PO daily, furosemide 40mg PO daily  - strict Is/Os  - daily weights  - 1L fluid restriction  - K > 4, Mg > 2. Is/Os not well recorded  - increase Entresto to 49/51 BID on discharge, please send prescription to patient's pharmacy to ensure it is covered by insurance  - start Toprol 12.5mg daily  - decrease furosemide to 20mg daily, aldactone to 12.5mg PO daily  - patient to obtain bloodwork next Wednesday, follow up with HF clinic Monday Aug 29th(she will be called by our office for time) and with Dr Dougherty on Aug 21st at 10:30 am  - strict Is/Os  - daily weights  - 1L fluid restriction  - K > 4, Mg > 2.

## 2019-07-19 NOTE — PROGRESS NOTE ADULT - PROBLEM SELECTOR PROBLEM 1
Acute systolic (congestive) heart failure

## 2019-07-19 NOTE — PROGRESS NOTE ADULT - PROBLEM SELECTOR PLAN 6
LFTs peaked at AST/ALT 4473/2724; 159/822 yesterday, 72/600 today  -currently trending down, will continue to monitor   now downtrending   - per review of OSH records pt noted to have liver shock due to decompensated RHF  -RUQ US performed at OSH showed cholithiasis and severe diffuse gallbladder wall thickening. Surgery was consulted and recommended HIDA scan but no surgical intervention.   - Patient currently w/o s/s of acute cholecystitis, no leukocytosis, no abd pain, afebrile, tolerating PO well and ALT downtrending will hold off on HIDA scan.
LFTs peaked at AST/ALT 4473/2724; 159/822 yesterday, 72/600 today  -currently trending down, will continue to monitor   now downtrending   - per review of OSH records pt noted to have liver shock due to decompensated RHF  -RUQ US performed at OSH showed cholithiasis and severe diffuse gallbladder wall thickening. Surgery was consulted and recommended HIDA scan but no surgical intervention.   - Patient currently w/o s/s of acute cholecystitis, no leukocytosis, no abd pain, afebrile, tolerating PO well and ALT downtrending will hold off on HIDA scan.

## 2019-07-19 NOTE — PROGRESS NOTE ADULT - PROBLEM SELECTOR PLAN 5
most likely in the 2/2 decompensated HF   - now resolved
most likely in the 2/2 decompensated HF   - now resolved

## 2019-07-19 NOTE — PHARMACOTHERAPY INTERVENTION NOTE - COMMENTS
Counseled patient and family member (daughter) on discharge medications, including doses, timing of administration, indications, and side effects. Patient was provided a medication schedule to help facilitate medication administration and increase adherence. Counseled patient and family member (daughter) on discharge medications, including doses, timing of administration, indications, and side effects. Patient was provided a medication schedule to help facilitate medication administration and increase adherence.    Silvia Gilbert, PharmD  PGY1 Pharmacy Practice Resident  [phone]

## 2019-07-19 NOTE — PROGRESS NOTE ADULT - SUBJECTIVE AND OBJECTIVE BOX
Patient seen and examined at bedside.    Overnight Events:    REVIEW OF SYSTEMS:  CONSTITUTIONAL: No weakness, fevers or chills  EYES/ENT: No visual changes;  No dysphagia  NECK: No pain or stiffness  RESPIRATORY: + SOB, No cough, wheezing, hemoptysis  CARDIOVASCULAR: No chest pain or palpitations; + lower extremity edema  GASTROINTESTINAL: No abdominal or epigastric pain. No nausea, vomiting, or hematemesis; No diarrhea or constipation. No melena or hematochezia.  BACK: No back pain  GENITOURINARY: No dysuria, frequency or hematuria  NEUROLOGICAL: No numbness or weakness  SKIN: No itching, burning, rashes, or lesions         Current Meds:  amiodarone    Tablet 400 milliGRAM(s) Oral every 8 hours  amiodarone    Tablet 200 milliGRAM(s) Oral daily  amiodarone    Tablet   Oral   benzonatate 100 milliGRAM(s) Oral three times a day PRN  buDESOnide    EC Capsule 9 milliGRAM(s) Oral daily  chlorhexidine 2% Cloths 1 Application(s) Topical at bedtime  dextrose 40% Gel 15 Gram(s) Oral once PRN  dextrose 5%. 1000 milliLiter(s) IV Continuous <Continuous>  dextrose 50% Injectable 12.5 Gram(s) IV Push once  dextrose 50% Injectable 25 Gram(s) IV Push once  dextrose 50% Injectable 25 Gram(s) IV Push once  docusate sodium 100 milliGRAM(s) Oral two times a day  famotidine    Tablet 20 milliGRAM(s) Oral at bedtime  ferrous    sulfate 325 milliGRAM(s) Oral daily  furosemide    Tablet 40 milliGRAM(s) Oral daily  glucagon  Injectable 1 milliGRAM(s) IntraMuscular once PRN  guaiFENesin    Syrup 100 milliGRAM(s) Oral every 6 hours PRN  insulin lispro (HumaLOG) corrective regimen sliding scale   SubCutaneous three times a day before meals  insulin lispro (HumaLOG) corrective regimen sliding scale   SubCutaneous at bedtime  melatonin 3 milliGRAM(s) Oral at bedtime PRN  pantoprazole    Tablet 40 milliGRAM(s) Oral every 24 hours  rivaroxaban 20 milliGRAM(s) Oral with dinner  sacubitril 24 mG/valsartan 26 mG 1 Tablet(s) Oral two times a day  senna 2 Tablet(s) Oral at bedtime  simethicone 80 milliGRAM(s) Chew every 6 hours PRN  spironolactone 25 milliGRAM(s) Oral daily      Vitals:  T(F): 98.1 (07-18), Max: 98.5 (07-18)  HR: 76 (07-19) (68 - 76)  BP: 124/72 (07-19) (89/51 - 137/68)  RR: 18 (07-18)  SpO2: 97% (07-18)  I&O's Summary    18 Jul 2019 07:01  -  19 Jul 2019 07:00  --------------------------------------------------------  IN: 456 mL / OUT: 2000 mL / NET: -1544 mL    19 Jul 2019 07:01  -  19 Jul 2019 09:34  --------------------------------------------------------  IN: 0 mL / OUT: 625 mL / NET: -625 mL        Physical Exam:  GENERAL: No acute distress, well-developed  HEAD:  Atraumatic, Normocephalic  ENT: EOMI, PERRLA, conjunctiva and sclera clear, Neck supple, JVP 10-12cm  CHEST/LUNG: Clear to auscultation bilaterally; No wheeze, equal breath sounds bilaterally   HEART: irregularly irregular, No murmurs, rubs, or gallops  ABDOMEN: Soft, Nontender, Nondistended; Bowel sounds present  EXTREMITIES:  1+ edema  PSYCH: Nl behavior, nl affect  NEUROLOGY: AAOx3, non-focal, cranial nerves intact  SKIN: Normal color, No rashes or lesions                                     10.1   7.80  )-----------( 303      ( 19 Jul 2019 08:29 )             34.8     07-19    139  |  100  |  18  ----------------------------<  131<H>  4.1   |  26  |  0.61    Ca    8.8      19 Jul 2019 06:45  Phos  3.7     07-18  Mg     2.1     07-19    TPro  5.9<L>  /  Alb  3.5  /  TBili  0.4  /  DBili  x   /  AST  28  /  ALT  316<H>  /  AlkPhos  64  07-19    PT/INR - ( 18 Jul 2019 09:30 )   PT: 13.0 sec;   INR: 1.14 ratio    PTT - ( 18 Jul 2019 17:23 )  PTT:61.9 sec  CARDIAC MARKERS ( 12 Jul 2019 19:58 )  89 ng/L / x     / x     / 97 U/L / x     / 3.6 ng/mL      Serum Pro-Brain Natriuretic Peptide: 89071 pg/mL (07-12 @ 19:58)      Cardiovascular Diagnostic Testing:    Echo: Personally reviewed:  7/13/19  LA 4.9  LVIDd 4.7  EF 30%  Conclusions:  1. Tethered mitral valve leaflets with normal opening.  Mitral annular calcification. Moderate mitral  regurgitation.  2. Severe segmental left ventricular systolic dysfunction.  Multiple  segments appears Dyssynchronous. EF 25-30%  3. Right ventricular enlargement with decreased right  ventricular systolic function.  4. Estimated right ventricular systolic pressure equals 62  mm Hg, assuming right atrial pressure equals 12 mm Hg,  consistent with severe pulmonary hypertension.  5. Normal tricuspid valve. moderate - Severe tricuspid  regurgitation.  6. Estimated pulmonary artery systolic pressure equals 62  mm Hg, assuming right atrial pressure equals 12 mm Hg,  consistent with severe pulmonary pressures.  *** No previous Echo exam.    Imaging:    CXR: Personally reviewed: small b/l pleural effusions      Interpretation of Telemetry: Patient seen and examined at bedside.    Overnight Events: NAEO, s/p successful AGNES/DCCV 7/18. Feels well, eager to go home.      REVIEW OF SYSTEMS:  CONSTITUTIONAL: No weakness, fevers or chills  EYES/ENT: No visual changes;  No dysphagia  NECK: No pain or stiffness  RESPIRATORY: + SOB, No cough, wheezing, hemoptysis  CARDIOVASCULAR: No chest pain or palpitations; + lower extremity edema  GASTROINTESTINAL: No abdominal or epigastric pain. No nausea, vomiting, or hematemesis; No diarrhea or constipation. No melena or hematochezia.  BACK: No back pain  GENITOURINARY: No dysuria, frequency or hematuria  NEUROLOGICAL: No numbness or weakness  SKIN: No itching, burning, rashes, or lesions         Current Meds:  amiodarone    Tablet 400 milliGRAM(s) Oral every 8 hours  amiodarone    Tablet 200 milliGRAM(s) Oral daily  amiodarone    Tablet   Oral   benzonatate 100 milliGRAM(s) Oral three times a day PRN  buDESOnide    EC Capsule 9 milliGRAM(s) Oral daily  chlorhexidine 2% Cloths 1 Application(s) Topical at bedtime  dextrose 40% Gel 15 Gram(s) Oral once PRN  dextrose 5%. 1000 milliLiter(s) IV Continuous <Continuous>  dextrose 50% Injectable 12.5 Gram(s) IV Push once  dextrose 50% Injectable 25 Gram(s) IV Push once  dextrose 50% Injectable 25 Gram(s) IV Push once  docusate sodium 100 milliGRAM(s) Oral two times a day  famotidine    Tablet 20 milliGRAM(s) Oral at bedtime  ferrous    sulfate 325 milliGRAM(s) Oral daily  furosemide    Tablet 40 milliGRAM(s) Oral daily  glucagon  Injectable 1 milliGRAM(s) IntraMuscular once PRN  guaiFENesin    Syrup 100 milliGRAM(s) Oral every 6 hours PRN  insulin lispro (HumaLOG) corrective regimen sliding scale   SubCutaneous three times a day before meals  insulin lispro (HumaLOG) corrective regimen sliding scale   SubCutaneous at bedtime  melatonin 3 milliGRAM(s) Oral at bedtime PRN  pantoprazole    Tablet 40 milliGRAM(s) Oral every 24 hours  rivaroxaban 20 milliGRAM(s) Oral with dinner  sacubitril 24 mG/valsartan 26 mG 1 Tablet(s) Oral two times a day  senna 2 Tablet(s) Oral at bedtime  simethicone 80 milliGRAM(s) Chew every 6 hours PRN  spironolactone 25 milliGRAM(s) Oral daily      Vitals:  T(F): 98.1 (07-18), Max: 98.5 (07-18)  HR: 76 (07-19) (68 - 76)  BP: 124/72 (07-19) (89/51 - 137/68)  RR: 18 (07-18)  SpO2: 97% (07-18)  I&O's Summary    18 Jul 2019 07:01  -  19 Jul 2019 07:00  --------------------------------------------------------  IN: 456 mL / OUT: 2000 mL / NET: -1544 mL    19 Jul 2019 07:01  -  19 Jul 2019 09:34  --------------------------------------------------------  IN: 0 mL / OUT: 625 mL / NET: -625 mL        Physical Exam:  GENERAL: No acute distress, well-developed  HEAD:  Atraumatic, Normocephalic  ENT: EOMI, PERRLA, conjunctiva and sclera clear, Neck supple, no JVD  CHEST/LUNG: Clear to auscultation bilaterally; No wheeze, equal breath sounds bilaterally   HEART: irregularly irregular, No murmurs, rubs, or gallops  ABDOMEN: Soft, Nontender, Nondistended; Bowel sounds present  EXTREMITIES:  trace edema  PSYCH: Nl behavior, nl affect  NEUROLOGY: AAOx3, non-focal, cranial nerves intact  SKIN: Normal color, No rashes or lesions                                     10.1   7.80  )-----------( 303      ( 19 Jul 2019 08:29 )             34.8     07-19    139  |  100  |  18  ----------------------------<  131<H>  4.1   |  26  |  0.61    Ca    8.8      19 Jul 2019 06:45  Phos  3.7     07-18  Mg     2.1     07-19    TPro  5.9<L>  /  Alb  3.5  /  TBili  0.4  /  DBili  x   /  AST  28  /  ALT  316<H>  /  AlkPhos  64  07-19    PT/INR - ( 18 Jul 2019 09:30 )   PT: 13.0 sec;   INR: 1.14 ratio    PTT - ( 18 Jul 2019 17:23 )  PTT:61.9 sec  CARDIAC MARKERS ( 12 Jul 2019 19:58 )  89 ng/L / x     / x     / 97 U/L / x     / 3.6 ng/mL      Serum Pro-Brain Natriuretic Peptide: 16087 pg/mL (07-12 @ 19:58)      Cardiovascular Diagnostic Testing:    Echo: Personally reviewed:  7/13/19  LA 4.9  LVIDd 4.7  EF 30%  Conclusions:  1. Tethered mitral valve leaflets with normal opening.  Mitral annular calcification. Moderate mitral  regurgitation.  2. Severe segmental left ventricular systolic dysfunction.  Multiple  segments appears Dyssynchronous. EF 25-30%  3. Right ventricular enlargement with decreased right  ventricular systolic function.  4. Estimated right ventricular systolic pressure equals 62  mm Hg, assuming right atrial pressure equals 12 mm Hg,  consistent with severe pulmonary hypertension.  5. Normal tricuspid valve. moderate - Severe tricuspid  regurgitation.  6. Estimated pulmonary artery systolic pressure equals 62  mm Hg, assuming right atrial pressure equals 12 mm Hg,  consistent with severe pulmonary pressures.  *** No previous Echo exam.    Imaging:    CXR: Personally reviewed: small b/l pleural effusions      Interpretation of Telemetry: sinus 60s-70s, PACs, PVCs

## 2019-07-19 NOTE — PROGRESS NOTE ADULT - PROBLEM SELECTOR PLAN 1
TTE 7/13: EF 30%, OSH 20-25%  - c/w lasix 40mg po daily,  - 1L fluid restriction  - restart Toprol now that pt is euvolemic  -c/w Entresto 24-26 BID  per HF recs  -c/w spironolactone 25 mg po qd  -s/p LHC/RHC, showed RA 14, RV 44/21, PA 46/20, PCWP 25, LVEDP 27, and CO/CI 3.89/2.04  - Pt is clinically stable for discharge to f/up with Cardiology within 1 week

## 2019-07-19 NOTE — CHART NOTE - NSCHARTNOTEFT_GEN_A_CORE
Nutrition Note    Patient seen per verbal consult for review of low sodium/HF nutrition therapy.  RD visited Patient and daughter (at bedside).  Reinforced low sodium diet, continuing to not use salt at the table, reviewed label reading, encouraged continued focus on fresh prepared (not processed foods).  Questions answered.  Heart Failure Nutrition Therapy handout provided.  Patient and daughter made aware RD remains available PRN prior to discharge.    Jen Rabago MS, RD, CDN  Pager# [phone]

## 2019-07-20 LAB
DEPRECATED KAPPA LC FREE/LAMBDA SER: 26.29 — HIGH (ref 2.04–10.37)
KAPPA LC UR-MCNC: 117 MG/L — HIGH (ref 1.35–24.19)
LAMBDA LC UR-MCNC: 4.45 MG/L — SIGNIFICANT CHANGE UP (ref 0.24–6.66)

## 2019-07-22 ENCOUNTER — INBOUND DOCUMENT (OUTPATIENT)
Age: 79
End: 2019-07-22

## 2019-07-22 PROBLEM — I42.9 CARDIOMYOPATHY: Status: ACTIVE | Noted: 2019-07-22

## 2019-07-22 NOTE — PATIENT PROFILE ADULT - PRO INTERPRETER NEED 2
Pt has denied c/o.  See flow sheet.  Heart rate continues to fluctuate between 25-45 and Dr Cagle aware.  Pt and sig other Connie instructed to stop Aspirin and Atenolol and to not start Eliquis until tomorrow with verbalized understanding.   English

## 2019-07-25 ENCOUNTER — RESULT REVIEW (OUTPATIENT)
Age: 79
End: 2019-07-25

## 2019-07-25 LAB
ALBUMIN SERPL ELPH-MCNC: 4.1 G/DL
ALP BLD-CCNC: 59 U/L
ALT SERPL-CCNC: 94 U/L
ANION GAP SERPL CALC-SCNC: 16 MMOL/L
AST SERPL-CCNC: 25 U/L
BILIRUB SERPL-MCNC: 0.3 MG/DL
BUN SERPL-MCNC: 15 MG/DL
CALCIUM SERPL-MCNC: 9.7 MG/DL
CHLORIDE SERPL-SCNC: 103 MMOL/L
CO2 SERPL-SCNC: 24 MMOL/L
CREAT SERPL-MCNC: 0.67 MG/DL
GLUCOSE SERPL-MCNC: 128 MG/DL
POTASSIUM SERPL-SCNC: 5.5 MMOL/L
PROT SERPL-MCNC: 6.6 G/DL
SODIUM SERPL-SCNC: 143 MMOL/L

## 2019-07-29 ENCOUNTER — APPOINTMENT (OUTPATIENT)
Dept: CARDIOLOGY | Facility: CLINIC | Age: 79
End: 2019-07-29
Payer: MEDICARE

## 2019-07-29 ENCOUNTER — LABORATORY RESULT (OUTPATIENT)
Age: 79
End: 2019-07-29

## 2019-07-29 VITALS
BODY MASS INDEX: 38.71 KG/M2 | SYSTOLIC BLOOD PRESSURE: 133 MMHG | DIASTOLIC BLOOD PRESSURE: 73 MMHG | HEART RATE: 90 BPM | HEIGHT: 59 IN | OXYGEN SATURATION: 99 % | WEIGHT: 192 LBS

## 2019-07-29 DIAGNOSIS — I50.22 CHRONIC SYSTOLIC (CONGESTIVE) HEART FAILURE: ICD-10-CM

## 2019-07-29 DIAGNOSIS — I42.9 CARDIOMYOPATHY, UNSPECIFIED: ICD-10-CM

## 2019-07-29 DIAGNOSIS — Z86.79 PERSONAL HISTORY OF OTHER DISEASES OF THE CIRCULATORY SYSTEM: ICD-10-CM

## 2019-07-29 DIAGNOSIS — I42.8 OTHER CARDIOMYOPATHIES: ICD-10-CM

## 2019-07-29 PROCEDURE — 99214 OFFICE O/P EST MOD 30 MIN: CPT

## 2019-07-29 RX ORDER — VALSARTAN AND HYDROCHLOROTHIAZIDE 160; 25 MG/1; MG/1
160-25 TABLET, FILM COATED ORAL
Refills: 0 | Status: DISCONTINUED | COMMUNITY
End: 2019-07-29

## 2019-07-29 RX ORDER — ESOMEPRAZOLE MAGNESIUM 40 MG/1
40 CAPSULE, DELAYED RELEASE ORAL
Refills: 0 | Status: ACTIVE | COMMUNITY

## 2019-07-29 RX ORDER — EZETIMIBE AND SIMVASTATIN 10; 40 MG/1; MG/1
10-40 TABLET ORAL
Refills: 0 | Status: DISCONTINUED | COMMUNITY
End: 2019-07-29

## 2019-07-29 RX ORDER — AMIODARONE HYDROCHLORIDE 200 MG/1
200 TABLET ORAL DAILY
Qty: 90 | Refills: 3 | Status: ACTIVE | COMMUNITY
Start: 2019-07-29

## 2019-07-29 RX ORDER — METOPROLOL SUCCINATE 25 MG/1
25 TABLET, EXTENDED RELEASE ORAL DAILY
Qty: 30 | Refills: 0 | Status: ACTIVE | COMMUNITY
Start: 2019-07-29 | End: 1900-01-01

## 2019-07-29 RX ORDER — MOMETASONE 50 UG/1
50 SPRAY, METERED NASAL
Qty: 3 | Refills: 1 | Status: DISCONTINUED | COMMUNITY
Start: 2018-12-27 | End: 2019-07-29

## 2019-07-29 RX ORDER — DILTIAZEM HYDROCHLORIDE 180 MG/1
180 CAPSULE, EXTENDED RELEASE ORAL
Refills: 0 | Status: DISCONTINUED | COMMUNITY
End: 2019-07-29

## 2019-07-29 RX ORDER — ESCITALOPRAM OXALATE 10 MG/1
10 TABLET, FILM COATED ORAL
Refills: 0 | Status: DISCONTINUED | COMMUNITY
End: 2019-07-29

## 2019-07-29 RX ORDER — FERROUS GLUCONATE 324(37.5)
324 (37.5 FE) TABLET ORAL
Refills: 0 | Status: ACTIVE | COMMUNITY

## 2019-07-29 RX ORDER — RANITIDINE HYDROCHLORIDE 300 MG/1
300 CAPSULE ORAL DAILY
Refills: 0 | Status: ACTIVE | COMMUNITY

## 2019-07-29 RX ORDER — TRAMADOL HYDROCHLORIDE 50 MG/1
50 TABLET, COATED ORAL
Refills: 0 | Status: DISCONTINUED | COMMUNITY
End: 2019-07-29

## 2019-07-29 RX ORDER — VITAMIN E MIXED 400 UNIT
50 MCG CAPSULE ORAL
Refills: 0 | Status: ACTIVE | COMMUNITY
Start: 2019-07-29

## 2019-07-29 RX ORDER — RIVAROXABAN 20 MG/1
20 TABLET, FILM COATED ORAL
Refills: 0 | Status: ACTIVE | COMMUNITY

## 2019-07-29 RX ORDER — HYDROCODONE BITARTRATE AND ACETAMINOPHEN 7.5; 3 MG/1; MG/1
7.5-3 TABLET ORAL
Refills: 0 | Status: DISCONTINUED | COMMUNITY
End: 2019-07-29

## 2019-07-29 NOTE — REASON FOR VISIT
[Follow-Up - From Hospitalization] : follow-up of a recent hospitalization for [Cardiomyopathy] : cardiomyopathy [Heart Failure] : congestive heart failure [Discharge Date: ___] : Discharge Date: [unfilled] [Admitted for Heart Failure] : patient was admitted for heart failure [Family Member] : family member [FreeTextEntry1] : INSTRUCTIONS:\par \par 1. Please INCREASE METOPROLOL SUCCINATE to 25mg (1 tab) ONCE daily. \par \par 2. Labs drawn today we will call you with the results and send you and your primary care doctor a copy.\par \par 3. Continue with your other medications at this time.\par \par 4. We will send a referral for you for cardiac rehab\par \par 5. Follow up with Dr. Dougherty in 3 weeks.

## 2019-07-29 NOTE — DISCUSSION/SUMMARY
[Patient] : the patient [___ Week(s)] : [unfilled] week(s) [With ___] : with [unfilled] [FreeTextEntry2] : and her son [FreeTextEntry1] : Ms. Morin is a 77 y/o F with a history of afib s/p DCCV and HTN recently diagnosed with a nondilated NICM HFrEF (LVEF 30%) who presents today for follow up post hospital discharge overall doing fairly well. She is Stage C, NYHA Class III, euvolemic and normotensive not yet optimized on neurohormonal antagonists. On recent labs on 7/24 she was noted to have normal renal function and an elevated potassium of 5.5, now following a low potassium diet. I have recommended the following:\par \par 1. NICM HFrEF - Stable and well compensated. Will increase in Toprol XL to 25mg daily. Will recheck labs today to reassess potassium and renal function. If acceptable, will increase Entresto to 97-103mg PO BID and continue with lasix 20mg daily. If potassium remains high can consider discontinuing spironolactone in favor of uptitration of Entresto with follow up labs. Will refer her to cardiac rehab. Will plan to repeat TTE one month after she is optimized on GDMT to reassess LVEF and assess if a device is indicated.\par \par 2. hx of afib s/p DCCV - She will continue amiodarone and Xarelto. Follow up with EP, Dr. Tadeo.\par \par 3. HTN - Currently well controlled on current therapies.\par \par 4. Follow- up in 3 weeks with Dr. Dougherty.

## 2019-07-29 NOTE — HISTORY OF PRESENT ILLNESS
[FreeTextEntry1] : Ms. Morin is a 79 y/o F with a recently diagnosed NICM HFrEF (LVEF 30%, LVIDd 4.7cm) who presents today for follow up post hospital discharge. Her other comorbidities include hx of afib (on Xarelto) s/p DCCV on 7/18/19, HTN, Crohn's disease, and GERD.\par \par She was hospitalized from 7/12- 7/19 initially presenting to [facility] with SOB found to be volume overloaded and in afib with RVR. Echocardiogram showed LVEF 25% from 55-60% in 2017. She was initially treated with IV diltiazem for rate control and was given several liters of IVF for concern for sepsis given a leukocytosis. She was transferred to the CCU and further treated with diuresis, amio and digoxin. She was transferred on 7/15 to [facility] for further management. She underwent RHC and coronary angiogram on 7/15 which showed normal coronaries, elevated filling pressures and low cardiac output. She was noted to be normotensive at that time on minimal afterload reduction thus was further diuresed and her afterload reduction was uptitrated. She underwent successful AGNES and DCCV on 7/18. She was discharged home on 7/19 at a weight of 186lbs. \par \par Since discharge she reports overall feeling well although notes fatigue with minimal exertion. She is able to walk around the house and can climb about 4 steps before needing to rest. She denies SOB noting fatigue to be her limiting symptoms. Her son, however, notes her breathing to be heavy with this level of exertion. Yesterday she reported an episode of palpitations associated with lightheadedness and presyncope. States a similar episode has not happened before or since. Notes HR at that time was 120 and BP 90/60s prior to taking her AM medications. She called her visiting nurse who recommended she take her medications, which she did and reported feeling better a short period after. She reports some swelling in her LE edema which is significantly improved from the hospitalization and better in the morning when she wakes up. Her weight has ranged from 187-196lbs over the past 10 days since discharge, however has been stable at 191-192 lbs over the past 5 days. Her HR has been generally running in the 80s and her -120s. Despite instructions to increase her lasix to 40mg daily by HF NP on 7/25, she has continued taking 20mg daily. She otherwise has been taking her medications as prescribed. She limits her fluids to less than 2L/day and endorses following a low sodium diet. She denies orthopnea, PND, CP, abdominal distention, hematuria, hematochezia, and melena.

## 2019-07-29 NOTE — PHYSICAL EXAM
[General Appearance - Well Developed] : well developed [Well Groomed] : well groomed [General Appearance - Well Nourished] : well nourished [General Appearance - In No Acute Distress] : no acute distress [Eyelids - No Xanthelasma] : the eyelids demonstrated no xanthelasmas [No Oral Pallor] : no oral pallor [No Oral Cyanosis] : no oral cyanosis [] : no respiratory distress [Respiration, Rhythm And Depth] : normal respiratory rhythm and effort [Auscultation Breath Sounds / Voice Sounds] : lungs were clear to auscultation bilaterally [Heart Rate And Rhythm] : heart rate and rhythm were normal [Heart Sounds] : normal S1 and S2 [Arterial Pulses Normal] : the arterial pulses were normal [Bowel Sounds] : normal bowel sounds [Abdomen Soft] : soft [Abdomen Tenderness] : non-tender [Nail Clubbing] : no clubbing of the fingernails [Cyanosis, Localized] : no localized cyanosis [Skin Color & Pigmentation] : normal skin color and pigmentation [Skin Turgor] : normal skin turgor [Oriented To Time, Place, And Person] : oriented to person, place, and time [Impaired Insight] : insight and judgment were intact [Affect] : the affect was normal [Mood] : the mood was normal [No Anxiety] : not feeling anxious [FreeTextEntry1] : Slow, using a cane to walk and a wheelchair for distance

## 2019-07-30 ENCOUNTER — RESULT REVIEW (OUTPATIENT)
Age: 79
End: 2019-07-30

## 2019-07-30 LAB
ALBUMIN SERPL ELPH-MCNC: 4.1 G/DL
ALP BLD-CCNC: 53 U/L
ALT SERPL-CCNC: 43 U/L
ANION GAP SERPL CALC-SCNC: 17 MMOL/L
AST SERPL-CCNC: 37 U/L
BASOPHILS # BLD AUTO: 0.08 K/UL
BASOPHILS NFR BLD AUTO: 0.8 %
BILIRUB SERPL-MCNC: 0.3 MG/DL
BUN SERPL-MCNC: 19 MG/DL
CALCIUM SERPL-MCNC: 9.6 MG/DL
CHLORIDE SERPL-SCNC: 102 MMOL/L
CO2 SERPL-SCNC: 21 MMOL/L
CREAT SERPL-MCNC: 0.71 MG/DL
EOSINOPHIL # BLD AUTO: 0.14 K/UL
EOSINOPHIL NFR BLD AUTO: 1.4 %
GLUCOSE SERPL-MCNC: 126 MG/DL
HCT VFR BLD CALC: 45.5 %
HGB BLD-MCNC: 12.7 G/DL
IMM GRANULOCYTES NFR BLD AUTO: 0.5 %
LYMPHOCYTES # BLD AUTO: 1.87 K/UL
LYMPHOCYTES NFR BLD AUTO: 18.1 %
MAGNESIUM SERPL-MCNC: 2.1 MG/DL
MAN DIFF?: NORMAL
MCHC RBC-ENTMCNC: 24 PG
MCHC RBC-ENTMCNC: 27.9 GM/DL
MCV RBC AUTO: 85.8 FL
MONOCYTES # BLD AUTO: 1.04 K/UL
MONOCYTES NFR BLD AUTO: 10.1 %
NEUTROPHILS # BLD AUTO: 7.14 K/UL
NEUTROPHILS NFR BLD AUTO: 69.1 %
NT-PROBNP SERPL-MCNC: 1901 PG/ML
PLATELET # BLD AUTO: 455 K/UL
POTASSIUM SERPL-SCNC: 4.2 MMOL/L
PROT SERPL-MCNC: 7.1 G/DL
RBC # BLD: 5.3 M/UL
RBC # FLD: 20.7 %
SODIUM SERPL-SCNC: 140 MMOL/L
WBC # FLD AUTO: 10.32 K/UL

## 2019-07-30 RX ORDER — SACUBITRIL AND VALSARTAN 97; 103 MG/1; MG/1
97-103 TABLET, FILM COATED ORAL
Qty: 60 | Refills: 3 | Status: ACTIVE | COMMUNITY
Start: 2019-07-29 | End: 1900-01-01

## 2019-08-06 ENCOUNTER — OTHER (OUTPATIENT)
Age: 79
End: 2019-08-06

## 2019-08-06 ENCOUNTER — APPOINTMENT (OUTPATIENT)
Dept: GASTROENTEROLOGY | Facility: CLINIC | Age: 79
End: 2019-08-06
Payer: MEDICARE

## 2019-08-06 VITALS
WEIGHT: 186 LBS | HEIGHT: 59 IN | SYSTOLIC BLOOD PRESSURE: 153 MMHG | BODY MASS INDEX: 37.5 KG/M2 | DIASTOLIC BLOOD PRESSURE: 87 MMHG | HEART RATE: 96 BPM

## 2019-08-06 PROCEDURE — 99213 OFFICE O/P EST LOW 20 MIN: CPT

## 2019-08-06 RX ORDER — DILTIAZEM HYDROCHLORIDE 180 MG/1
180 CAPSULE, EXTENDED RELEASE ORAL
Qty: 90 | Refills: 0 | Status: ACTIVE | COMMUNITY
Start: 2018-06-19

## 2019-08-06 RX ORDER — SACUBITRIL AND VALSARTAN 49; 51 MG/1; MG/1
49-51 TABLET, FILM COATED ORAL
Qty: 60 | Refills: 0 | Status: ACTIVE | COMMUNITY
Start: 2019-07-19

## 2019-08-06 RX ORDER — CLINDAMYCIN HYDROCHLORIDE 300 MG/1
300 CAPSULE ORAL
Qty: 28 | Refills: 0 | Status: ACTIVE | COMMUNITY
Start: 2019-03-28

## 2019-08-06 RX ORDER — FUROSEMIDE 20 MG/1
20 TABLET ORAL
Qty: 30 | Refills: 5 | Status: ACTIVE | COMMUNITY
Start: 1900-01-01 | End: 1900-01-01

## 2019-08-06 RX ORDER — EZETIMIBE AND SIMVASTATIN 10; 20 MG/1; MG/1
10-20 TABLET ORAL
Qty: 90 | Refills: 0 | Status: ACTIVE | COMMUNITY
Start: 2019-06-08

## 2019-08-06 RX ORDER — RANITIDINE 300 MG/1
300 TABLET ORAL
Qty: 30 | Refills: 0 | Status: ACTIVE | COMMUNITY
Start: 2019-04-12

## 2019-08-06 RX ORDER — FUROSEMIDE 40 MG/1
40 TABLET ORAL
Qty: 90 | Refills: 0 | Status: ACTIVE | COMMUNITY
Start: 2018-12-10

## 2019-08-06 RX ORDER — SPIRONOLACTONE 25 MG/1
25 TABLET ORAL DAILY
Qty: 30 | Refills: 5 | Status: ACTIVE | COMMUNITY
Start: 2019-07-29 | End: 1900-01-01

## 2019-08-06 RX ORDER — PITAVASTATIN CALCIUM 4.18 MG/1
4 TABLET, FILM COATED ORAL
Qty: 30 | Refills: 0 | Status: ACTIVE | COMMUNITY
Start: 2019-05-16

## 2019-08-06 NOTE — PHYSICAL EXAM
[General Appearance - In No Acute Distress] : in no acute distress [General Appearance - Alert] : alert [Sclera] : the sclera and conjunctiva were normal [PERRL With Normal Accommodation] : pupils were equal in size, round, and reactive to light [Outer Ear] : the ears and nose were normal in appearance [Extraocular Movements] : extraocular movements were intact [Oropharynx] : the oropharynx was normal [Neck Appearance] : the appearance of the neck was normal [Neck Cervical Mass (___cm)] : no neck mass was observed [Thyroid Nodule] : there were no palpable thyroid nodules [Jugular Venous Distention Increased] : there was no jugular-venous distention [Thyroid Diffuse Enlargement] : the thyroid was not enlarged [Auscultation Breath Sounds / Voice Sounds] : lungs were clear to auscultation bilaterally [Heart Sounds] : normal S1 and S2 [Heart Rate And Rhythm] : heart rate was normal and rhythm regular [Heart Sounds Gallop] : no gallops [Murmurs] : no murmurs [Heart Sounds Pericardial Friction Rub] : no pericardial rub [Bowel Sounds] : normal bowel sounds [Abdomen Tenderness] : non-tender [Abdomen Soft] : soft [] : no hepato-splenomegaly [Abdomen Mass (___ Cm)] : no abdominal mass palpated [Cervical Lymph Nodes Enlarged Posterior Bilaterally] : posterior cervical [Cervical Lymph Nodes Enlarged Anterior Bilaterally] : anterior cervical [Supraclavicular Lymph Nodes Enlarged Bilaterally] : supraclavicular [Axillary Lymph Nodes Enlarged Bilaterally] : axillary [Femoral Lymph Nodes Enlarged Bilaterally] : femoral [No CVA Tenderness] : no ~M costovertebral angle tenderness [Inguinal Lymph Nodes Enlarged Bilaterally] : inguinal [No Spinal Tenderness] : no spinal tenderness [Abnormal Walk] : normal gait [Nail Clubbing] : no clubbing  or cyanosis of the fingernails [Musculoskeletal - Swelling] : no joint swelling seen [Motor Tone] : muscle strength and tone were normal

## 2019-08-06 NOTE — ASSESSMENT
[FreeTextEntry1] : Recent GI bleed which has resolved unclear etiology as upper endoscopy and colonoscopy were minimally revealing would consider capsule endoscopy if recurs

## 2019-08-06 NOTE — HISTORY OF PRESENT ILLNESS
[FreeTextEntry1] : Patient is today in followup recent hemoglobin was 12.5 no evidence of bleeding feeling well

## 2019-08-08 ENCOUNTER — LABORATORY RESULT (OUTPATIENT)
Age: 79
End: 2019-08-08

## 2019-08-14 ENCOUNTER — APPOINTMENT (OUTPATIENT)
Dept: CARDIOLOGY | Facility: CLINIC | Age: 79
End: 2019-08-14

## 2019-09-17 ENCOUNTER — APPOINTMENT (OUTPATIENT)
Dept: GASTROENTEROLOGY | Facility: CLINIC | Age: 79
End: 2019-09-17
Payer: MEDICARE

## 2019-09-17 VITALS
SYSTOLIC BLOOD PRESSURE: 137 MMHG | WEIGHT: 182 LBS | DIASTOLIC BLOOD PRESSURE: 80 MMHG | HEART RATE: 81 BPM | HEIGHT: 59 IN | BODY MASS INDEX: 36.69 KG/M2

## 2019-09-17 PROCEDURE — 99213 OFFICE O/P EST LOW 20 MIN: CPT

## 2019-09-17 NOTE — HISTORY OF PRESENT ILLNESS
[FreeTextEntry1] : Recently had an ablation furniture fibrillation feels great no anemia reported at this time no melena appetite remains good and she has been losing weight intentionally

## 2019-09-17 NOTE — PHYSICAL EXAM
[General Appearance - Alert] : alert [General Appearance - In No Acute Distress] : in no acute distress [Sclera] : the sclera and conjunctiva were normal [PERRL With Normal Accommodation] : pupils were equal in size, round, and reactive to light [Extraocular Movements] : extraocular movements were intact [Outer Ear] : the ears and nose were normal in appearance [Neck Appearance] : the appearance of the neck was normal [Oropharynx] : the oropharynx was normal [Neck Cervical Mass (___cm)] : no neck mass was observed [Jugular Venous Distention Increased] : there was no jugular-venous distention [Thyroid Diffuse Enlargement] : the thyroid was not enlarged [Thyroid Nodule] : there were no palpable thyroid nodules [Auscultation Breath Sounds / Voice Sounds] : lungs were clear to auscultation bilaterally [Heart Rate And Rhythm] : heart rate was normal and rhythm regular [Heart Sounds] : normal S1 and S2 [Heart Sounds Gallop] : no gallops [Murmurs] : no murmurs [Heart Sounds Pericardial Friction Rub] : no pericardial rub [Bowel Sounds] : normal bowel sounds [Abdomen Soft] : soft [Abdomen Tenderness] : non-tender [] : no hepato-splenomegaly [Cervical Lymph Nodes Enlarged Anterior Bilaterally] : anterior cervical [Cervical Lymph Nodes Enlarged Posterior Bilaterally] : posterior cervical [Abdomen Mass (___ Cm)] : no abdominal mass palpated [Axillary Lymph Nodes Enlarged Bilaterally] : axillary [Supraclavicular Lymph Nodes Enlarged Bilaterally] : supraclavicular [Femoral Lymph Nodes Enlarged Bilaterally] : femoral [Inguinal Lymph Nodes Enlarged Bilaterally] : inguinal [No CVA Tenderness] : no ~M costovertebral angle tenderness [No Spinal Tenderness] : no spinal tenderness [Abnormal Walk] : normal gait [Nail Clubbing] : no clubbing  or cyanosis of the fingernails [Musculoskeletal - Swelling] : no joint swelling seen [Motor Tone] : muscle strength and tone were normal

## 2019-10-31 ENCOUNTER — APPOINTMENT (OUTPATIENT)
Dept: GASTROENTEROLOGY | Facility: CLINIC | Age: 79
End: 2019-10-31
Payer: MEDICARE

## 2019-10-31 VITALS
HEIGHT: 59 IN | HEART RATE: 84 BPM | SYSTOLIC BLOOD PRESSURE: 128 MMHG | BODY MASS INDEX: 36.89 KG/M2 | WEIGHT: 183 LBS | DIASTOLIC BLOOD PRESSURE: 109 MMHG

## 2019-10-31 DIAGNOSIS — D64.9 ANEMIA, UNSPECIFIED: ICD-10-CM

## 2019-10-31 PROCEDURE — 99213 OFFICE O/P EST LOW 20 MIN: CPT

## 2019-10-31 NOTE — PHYSICAL EXAM
[General Appearance - Alert] : alert [General Appearance - In No Acute Distress] : in no acute distress [Sclera] : the sclera and conjunctiva were normal [PERRL With Normal Accommodation] : pupils were equal in size, round, and reactive to light [Extraocular Movements] : extraocular movements were intact [Outer Ear] : the ears and nose were normal in appearance [Oropharynx] : the oropharynx was normal [Neck Appearance] : the appearance of the neck was normal [Neck Cervical Mass (___cm)] : no neck mass was observed [Jugular Venous Distention Increased] : there was no jugular-venous distention [Thyroid Diffuse Enlargement] : the thyroid was not enlarged [Thyroid Nodule] : there were no palpable thyroid nodules [Auscultation Breath Sounds / Voice Sounds] : lungs were clear to auscultation bilaterally [Heart Rate And Rhythm] : heart rate was normal and rhythm regular [Heart Sounds] : normal S1 and S2 [Heart Sounds Gallop] : no gallops [Murmurs] : no murmurs [Heart Sounds Pericardial Friction Rub] : no pericardial rub [Bowel Sounds] : normal bowel sounds [Abdomen Soft] : soft [Abdomen Tenderness] : non-tender [] : no hepato-splenomegaly [Abdomen Mass (___ Cm)] : no abdominal mass palpated [Cervical Lymph Nodes Enlarged Posterior Bilaterally] : posterior cervical [Cervical Lymph Nodes Enlarged Anterior Bilaterally] : anterior cervical [Supraclavicular Lymph Nodes Enlarged Bilaterally] : supraclavicular [Axillary Lymph Nodes Enlarged Bilaterally] : axillary [Femoral Lymph Nodes Enlarged Bilaterally] : femoral [Inguinal Lymph Nodes Enlarged Bilaterally] : inguinal [No CVA Tenderness] : no ~M costovertebral angle tenderness [No Spinal Tenderness] : no spinal tenderness [Abnormal Walk] : normal gait [Nail Clubbing] : no clubbing  or cyanosis of the fingernails [Musculoskeletal - Swelling] : no joint swelling seen [Motor Tone] : muscle strength and tone were normal [Deep Tendon Reflexes (DTR)] : deep tendon reflexes were 2+ and symmetric [Sensation] : the sensory exam was normal to light touch and pinprick [No Focal Deficits] : no focal deficits [Oriented To Time, Place, And Person] : oriented to person, place, and time [Impaired Insight] : insight and judgment were intact [Affect] : the affect was normal

## 2019-10-31 NOTE — HISTORY OF PRESENT ILLNESS
[FreeTextEntry1] : Patient has been on Entocort 9 mg each morning for Crohn's disease doing quite well and indeed from time to time she will have constipation she denies any rectal bleeding or mucus per day denies any change in bowel habits or abdominal cramps she denies any rectal bleeding which is important as she was recently evaluated for an anemia of unclear etiology she reports having had recent blood work which was better than the hospital

## 2019-10-31 NOTE — ASSESSMENT
[FreeTextEntry1] : #1 anemia will records of recent blood work she reports as being within normal limits #2 Crohn disease she is doing quite well with nonsteroidal anti-inflammatory drugsOver a 2 month period will taper her Entocort to one tablet a

## 2019-11-13 ENCOUNTER — APPOINTMENT (OUTPATIENT)
Dept: CARDIOLOGY | Facility: CLINIC | Age: 79
End: 2019-11-13

## 2019-11-13 ENCOUNTER — RX RENEWAL (OUTPATIENT)
Age: 79
End: 2019-11-13

## 2019-11-15 ENCOUNTER — RX RENEWAL (OUTPATIENT)
Age: 79
End: 2019-11-15

## 2020-01-30 ENCOUNTER — APPOINTMENT (OUTPATIENT)
Dept: GASTROENTEROLOGY | Facility: CLINIC | Age: 80
End: 2020-01-30

## 2020-03-03 NOTE — ED PROVIDER NOTE - MEDICAL DECISION MAKING DETAILS
Patient ambulate to bathroom; gait steady; voids clear yellow urine without difficulty; returns to sit up in chair. 75 y/o female with chest pain. Will order labs, EKG, CXR, and meds. Then re-assess.

## 2020-04-03 ENCOUNTER — RX RENEWAL (OUTPATIENT)
Age: 80
End: 2020-04-03

## 2020-09-11 ENCOUNTER — RX RENEWAL (OUTPATIENT)
Age: 80
End: 2020-09-11

## 2020-11-13 ENCOUNTER — RX RENEWAL (OUTPATIENT)
Age: 80
End: 2020-11-13

## 2020-12-14 ENCOUNTER — RX RENEWAL (OUTPATIENT)
Age: 80
End: 2020-12-14

## 2021-01-11 ENCOUNTER — RX RENEWAL (OUTPATIENT)
Age: 81
End: 2021-01-11

## 2021-01-12 ENCOUNTER — APPOINTMENT (OUTPATIENT)
Dept: GASTROENTEROLOGY | Facility: CLINIC | Age: 81
End: 2021-01-12
Payer: MEDICARE

## 2021-01-12 PROCEDURE — 99441: CPT | Mod: 95

## 2021-01-12 NOTE — HISTORY OF PRESENT ILLNESS
[de-identified] : 81 yo female with hx of crohn's disease, last colonoscopy was in 2019 w/ Dr. Theodore.  She has been on Entocort 3mg daily for the past several months without any issues. Denies any recent flares for awhile.  Currently, she feels the best she has felt in awhile and wants to continue the medication.  No fevers, abdominal pain, n/v, rectal bleeding or melena.

## 2021-01-12 NOTE — REASON FOR VISIT
[Home] : at home, [unfilled] , at the time of the visit. [Medical Office: ([facility])___] : at the medical office located in  [Follow-Up: _____] : a [unfilled] follow-up visit

## 2021-01-12 NOTE — ASSESSMENT
[FreeTextEntry1] : 81 yo female with hx of Crohn's disease doing overall well on Entocort 3mg daily without any issues.  \par Will continue current treatment for now. \par Discussed with Dr. Theodore.

## 2021-02-25 NOTE — PROVIDER CONTACT NOTE (CRITICAL VALUE NOTIFICATION) - ACTION/TREATMENT ORDERED:
Hold Heparin for an hour and restart @ 1600 units
re-draw PTT
Follow heparin nomogram. Hold heparin for 60 minutes, restart at 13mL/hour. Will continue to monitor.
Labs/EKG/Imaging Studies/Medications

## 2021-03-16 ENCOUNTER — RX RENEWAL (OUTPATIENT)
Age: 81
End: 2021-03-16

## 2021-04-13 ENCOUNTER — APPOINTMENT (OUTPATIENT)
Dept: GASTROENTEROLOGY | Facility: CLINIC | Age: 81
End: 2021-04-13
Payer: MEDICARE

## 2021-04-13 VITALS
WEIGHT: 194 LBS | DIASTOLIC BLOOD PRESSURE: 59 MMHG | BODY MASS INDEX: 39.11 KG/M2 | HEIGHT: 59 IN | SYSTOLIC BLOOD PRESSURE: 108 MMHG | HEART RATE: 79 BPM

## 2021-04-13 DIAGNOSIS — I89.0 LYMPHEDEMA, NOT ELSEWHERE CLASSIFIED: ICD-10-CM

## 2021-04-13 PROCEDURE — 99213 OFFICE O/P EST LOW 20 MIN: CPT

## 2021-04-13 NOTE — ASSESSMENT
[FreeTextEntry1] : Abdominal pain in the past secondary to Crohn's disease while maintained on budesonide 3 mg daily. Will attempt to use 3 mg every other day as a trial to wean off budesonide

## 2021-04-13 NOTE — HISTORY OF PRESENT ILLNESS
[FreeTextEntry1] : Patient has a history of Crohn's disease. Her last colonoscopy was in 2019 and her abdominal pain responded very well to budesonide 9 mg daily. She was reduced to 3 mg and when going off of lactose. Her pain returns. She has been staying home. Due to Covid. At this time. She feels well. No diarrhea. No pain no cramps

## 2021-04-13 NOTE — PHYSICAL EXAM
[General Appearance - Alert] : alert [General Appearance - In No Acute Distress] : in no acute distress [Sclera] : the sclera and conjunctiva were normal [PERRL With Normal Accommodation] : pupils were equal in size, round, and reactive to light [Extraocular Movements] : extraocular movements were intact [Outer Ear] : the ears and nose were normal in appearance [Oropharynx] : the oropharynx was normal [Neck Appearance] : the appearance of the neck was normal [Neck Cervical Mass (___cm)] : no neck mass was observed [Jugular Venous Distention Increased] : there was no jugular-venous distention [Thyroid Diffuse Enlargement] : the thyroid was not enlarged [Thyroid Nodule] : there were no palpable thyroid nodules [Auscultation Breath Sounds / Voice Sounds] : lungs were clear to auscultation bilaterally [Heart Rate And Rhythm] : heart rate was normal and rhythm regular [Heart Sounds] : normal S1 and S2 [Heart Sounds Gallop] : no gallops [Murmurs] : no murmurs [Heart Sounds Pericardial Friction Rub] : no pericardial rub [Bowel Sounds] : normal bowel sounds [Abdomen Soft] : soft [Abdomen Tenderness] : non-tender [Abdomen Mass (___ Cm)] : no abdominal mass palpated [] : no hepato-splenomegaly [Cervical Lymph Nodes Enlarged Posterior Bilaterally] : posterior cervical [Cervical Lymph Nodes Enlarged Anterior Bilaterally] : anterior cervical [Supraclavicular Lymph Nodes Enlarged Bilaterally] : supraclavicular [Axillary Lymph Nodes Enlarged Bilaterally] : axillary [Femoral Lymph Nodes Enlarged Bilaterally] : femoral [Inguinal Lymph Nodes Enlarged Bilaterally] : inguinal [No CVA Tenderness] : no ~M costovertebral angle tenderness [No Spinal Tenderness] : no spinal tenderness [Abnormal Walk] : normal gait [Nail Clubbing] : no clubbing  or cyanosis of the fingernails [Musculoskeletal - Swelling] : no joint swelling seen [Motor Tone] : muscle strength and tone were normal [Deep Tendon Reflexes (DTR)] : deep tendon reflexes were 2+ and symmetric [Sensation] : the sensory exam was normal to light touch and pinprick [No Focal Deficits] : no focal deficits [Oriented To Time, Place, And Person] : oriented to person, place, and time [Impaired Insight] : insight and judgment were intact [Affect] : the affect was normal

## 2021-06-04 NOTE — PROGRESS NOTE ADULT - SUBJECTIVE AND OBJECTIVE BOX
Patient is a 78y old  Female who presents with a chief complaint of Systolic HF (17 Jul 2019 07:21)      SUBJECTIVE / OVERNIGHT EVENTS: Pt seen and examined at bedside. She denies any CP, SOB, abd pain and n/v. she c/o mild sore throat post procedure.     ROS:  All other review of systems negative    Allergies    No Known Allergies    Intolerances        MEDICATIONS  (STANDING):  amiodarone    Tablet 400 milliGRAM(s) Oral every 8 hours  amiodarone    Tablet   Oral   buDESOnide    EC Capsule 9 milliGRAM(s) Oral daily  chlorhexidine 2% Cloths 1 Application(s) Topical at bedtime  dextrose 5%. 1000 milliLiter(s) (50 mL/Hr) IV Continuous <Continuous>  dextrose 50% Injectable 12.5 Gram(s) IV Push once  dextrose 50% Injectable 25 Gram(s) IV Push once  dextrose 50% Injectable 25 Gram(s) IV Push once  docusate sodium 100 milliGRAM(s) Oral two times a day  famotidine    Tablet 20 milliGRAM(s) Oral at bedtime  ferrous    sulfate 325 milliGRAM(s) Oral daily  furosemide    Tablet 40 milliGRAM(s) Oral daily  heparin  Infusion.  Unit(s)/Hr (17 mL/Hr) IV Continuous <Continuous>  insulin lispro (HumaLOG) corrective regimen sliding scale   SubCutaneous three times a day before meals  insulin lispro (HumaLOG) corrective regimen sliding scale   SubCutaneous at bedtime  pantoprazole    Tablet 40 milliGRAM(s) Oral every 24 hours  sacubitril 24 mG/valsartan 26 mG 1 Tablet(s) Oral two times a day  senna 2 Tablet(s) Oral at bedtime  spironolactone 25 milliGRAM(s) Oral daily    MEDICATIONS  (PRN):  benzonatate 100 milliGRAM(s) Oral three times a day PRN Cough  dextrose 40% Gel 15 Gram(s) Oral once PRN Blood Glucose LESS THAN 70 milliGRAM(s)/deciliter  glucagon  Injectable 1 milliGRAM(s) IntraMuscular once PRN Glucose LESS THAN 70 milligrams/deciliter  guaiFENesin    Syrup 100 milliGRAM(s) Oral every 6 hours PRN Cough  heparin  Injectable 7500 Unit(s) IV Push every 6 hours PRN For aPTT less than 40  heparin  Injectable 3500 Unit(s) IV Push every 6 hours PRN For aPTT between 40 - 57  melatonin 3 milliGRAM(s) Oral at bedtime PRN Insomnia  simethicone 80 milliGRAM(s) Chew every 6 hours PRN Indigestion      Vital Signs Last 24 Hrs  T(C): 37.1 (18 Jul 2019 05:44), Max: 37.1 (18 Jul 2019 05:44)  T(F): 98.7 (18 Jul 2019 05:44), Max: 98.7 (18 Jul 2019 05:44)  HR: 96 (18 Jul 2019 05:44) (96 - 111)  BP: 120/65 (18 Jul 2019 05:44) (115/77 - 140/66)  BP(mean): 99 (17 Jul 2019 15:00) (99 - 99)  RR: 16 (18 Jul 2019 05:44) (16 - 20)  SpO2: 96% (18 Jul 2019 05:44) (96% - 99%)  CAPILLARY BLOOD GLUCOSE      POCT Blood Glucose.: 124 mg/dL (18 Jul 2019 13:55)  POCT Blood Glucose.: 144 mg/dL (18 Jul 2019 07:36)  POCT Blood Glucose.: 150 mg/dL (17 Jul 2019 21:51)  POCT Blood Glucose.: 166 mg/dL (17 Jul 2019 19:31)    I&O's Summary    17 Jul 2019 07:01  -  18 Jul 2019 07:00  --------------------------------------------------------  IN: 722 mL / OUT: 1550 mL / NET: -828 mL    18 Jul 2019 07:01  -  18 Jul 2019 14:47  --------------------------------------------------------  IN: 0 mL / OUT: 800 mL / NET: -800 mL        PHYSICAL EXAM:  GENERAL: NAD, well-developed  HEAD:  Atraumatic, Normocephalic  EYES: EOMI, PERRLA, conjunctiva and sclera clear  CHEST/LUNG: Clear to auscultation bilaterally; No wheeze  HEART: Regular rate and rhythm; No murmurs, rubs, or gallops  ABDOMEN: Soft, Nontender, Nondistended; Bowel sounds present  EXTREMITIES:  2+ Peripheral Pulses, No clubbing, cyanosis, or edema  NEUROLOGY: AAOx3, non-focal  SKIN: b/l UE ecchymosis     LABS:                        9.6    7.60  )-----------( 308      ( 18 Jul 2019 09:50 )             33.2     07-18    142  |  101  |  21  ----------------------------<  141<H>  3.7   |  27  |  0.60    Ca    8.7      18 Jul 2019 07:14  Phos  3.7     07-18  Mg     1.9     07-18    TPro  6.0  /  Alb  3.3  /  TBili  0.5  /  DBili  x   /  AST  36  /  ALT  420<H>  /  AlkPhos  64  07-18    PT/INR - ( 18 Jul 2019 09:30 )   PT: 13.0 sec;   INR: 1.14 ratio         PTT - ( 18 Jul 2019 09:30 )  PTT:61.8 sec          RADIOLOGY & ADDITIONAL TESTS:    Consultant(s) Notes Reviewed:  HF rec noted     Care Discussed with Consultants/Other Providers: Medicine NP     Case Discussed with dtr at bedside HPI/OVERNIGHT EVENTS:    EMILE, no complains, denies n/v/f/c CP or SOB.     MEDICATIONS  (STANDING):  atorvastatin 80 milliGRAM(s) Oral at bedtime  clopidogrel Tablet 75 milliGRAM(s) Oral daily  DULoxetine 60 milliGRAM(s) Oral daily  epoetin juan-epbx (RETACRIT) Injectable 20657 Unit(s) SubCutaneous <User Schedule>  isosorbide   mononitrate ER Tablet (IMDUR) 30 milliGRAM(s) Oral daily  NIFEdipine XL 90 milliGRAM(s) Oral daily  NIFEdipine XL 60 milliGRAM(s) Oral at bedtime  pantoprazole    Tablet 40 milliGRAM(s) Oral two times a day  tamsulosin 0.4 milliGRAM(s) Oral at bedtime  torsemide 20 milliGRAM(s) Oral <User Schedule>    MEDICATIONS  (PRN):      Vital Signs Last 24 Hrs  T(C): 36.4 (04 Jun 2021 05:06), Max: 36.9 (04 Jun 2021 01:39)  T(F): 97.6 (04 Jun 2021 05:06), Max: 98.5 (04 Jun 2021 01:39)  HR: 95 (04 Jun 2021 05:06) (87 - 95)  BP: 155/76 (04 Jun 2021 05:06) (133/56 - 167/71)  BP(mean): 90 (03 Jun 2021 15:45) (90 - 90)  RR: 18 (04 Jun 2021 05:06) (16 - 18)  SpO2: 98% (04 Jun 2021 05:06) (91% - 98%)    GENERAL: NAD, well-groomed, well-developed  HEAD: Atraumatic, normocephalic  SACHA COMA SCORE: E-4 V-4 M-6 = 14  MENTAL STATUS: AAO x3; Awake; Opens eyes spontaneously; conversant with episodes of confusion, following simple commands  CRANIAL NERVES: Visual acuity normal for age, PERRL. EOMI without nystagmus. Facial sensation intact V1-3 distribution b/l. Face symmetric w/ normal eye closure and smile, tongue midline. Hearing grossly intact. Speech clear.   MOTOR: strength 5/5 b/l upper extremities, no drift.  b/l LE 4-/5. DF/PF 5/5. Hip flexion 5/5.  SPINE: Nontender to light palpation in c/t/l spine  EXTREMITIES Left foot wrapped   SENSATION: grossly intact to light touch all extremities  CHEST/LUNG: Nonlabored breaths  Extremity: no new swelling, compartments are soft, pulses palpable.       I&O's Detail    03 Jun 2021 07:01  -  04 Jun 2021 06:48  --------------------------------------------------------  IN:    Oral Fluid: 360 mL  Total IN: 360 mL    OUT:    Stool (mL): 3 mL    Voided (mL): 65 mL  Total OUT: 68 mL    Total NET: 292 mL          LABS:                        9.6    8.67  )-----------( 253      ( 04 Jun 2021 06:27 )             31.2     06-03    138  |  96<L>  |  42.8<H>  ----------------------------<  94  3.7   |  26.0  |  4.80<H>    Ca    9.0      03 Jun 2021 06:07  Mg     2.0     06-03    TPro  6.8  /  Alb  3.4  /  TBili  0.5  /  DBili  x   /  AST  134<H>  /  ALT  60<H>  /  AlkPhos  114  06-02    PT/INR - ( 02 Jun 2021 12:33 )   PT: 13.2 sec;   INR: 1.15 ratio         PTT - ( 02 Jun 2021 12:33 )  PTT:33.2 sec

## 2021-07-13 NOTE — ED PROVIDER NOTE - PSH
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

## 2021-07-13 NOTE — DISCHARGE NOTE ADULT - PATIENT PORTAL LINK FT
“You can access the FollowHealth Patient Portal, offered by [facility], by registering with the following website: [email or web address]”
No

## 2021-07-22 ENCOUNTER — APPOINTMENT (OUTPATIENT)
Dept: GASTROENTEROLOGY | Facility: CLINIC | Age: 81
End: 2021-07-22
Payer: MEDICARE

## 2021-07-22 VITALS
DIASTOLIC BLOOD PRESSURE: 74 MMHG | SYSTOLIC BLOOD PRESSURE: 119 MMHG | BODY MASS INDEX: 37.5 KG/M2 | WEIGHT: 186 LBS | HEIGHT: 59 IN | HEART RATE: 75 BPM

## 2021-07-22 PROCEDURE — 99213 OFFICE O/P EST LOW 20 MIN: CPT

## 2021-07-22 NOTE — PHYSICAL EXAM
[General Appearance - Alert] : alert [General Appearance - In No Acute Distress] : in no acute distress [Sclera] : the sclera and conjunctiva were normal [PERRL With Normal Accommodation] : pupils were equal in size, round, and reactive to light [Extraocular Movements] : extraocular movements were intact [Outer Ear] : the ears and nose were normal in appearance [Oropharynx] : the oropharynx was normal [Neck Appearance] : the appearance of the neck was normal [Neck Cervical Mass (___cm)] : no neck mass was observed [Jugular Venous Distention Increased] : there was no jugular-venous distention [Thyroid Diffuse Enlargement] : the thyroid was not enlarged [Thyroid Nodule] : there were no palpable thyroid nodules [Auscultation Breath Sounds / Voice Sounds] : lungs were clear to auscultation bilaterally [Heart Rate And Rhythm] : heart rate was normal and rhythm regular [Heart Sounds] : normal S1 and S2 [Heart Sounds Gallop] : no gallops [Murmurs] : no murmurs [Heart Sounds Pericardial Friction Rub] : no pericardial rub [Bowel Sounds] : normal bowel sounds [Abdomen Soft] : soft [Abdomen Tenderness] : non-tender [] : no hepato-splenomegaly [Abdomen Mass (___ Cm)] : no abdominal mass palpated [Supraclavicular Lymph Nodes Enlarged Bilaterally] : supraclavicular [Axillary Lymph Nodes Enlarged Bilaterally] : axillary [Femoral Lymph Nodes Enlarged Bilaterally] : femoral [Inguinal Lymph Nodes Enlarged Bilaterally] : inguinal [Deep Tendon Reflexes (DTR)] : deep tendon reflexes were 2+ and symmetric [Sensation] : the sensory exam was normal to light touch and pinprick [No Focal Deficits] : no focal deficits [Oriented To Time, Place, And Person] : oriented to person, place, and time [Impaired Insight] : insight and judgment were intact [Affect] : the affect was normal

## 2021-07-22 NOTE — HISTORY OF PRESENT ILLNESS
[FreeTextEntry1] : Enoc budesonide every other day. She continues to do well. No diarrhea no abdominal pain or cramps

## 2021-07-22 NOTE — PHYSICAL EXAM
[General Appearance - Alert] : alert [General Appearance - In No Acute Distress] : in no acute distress [Sclera] : the sclera and conjunctiva were normal [PERRL With Normal Accommodation] : pupils were equal in size, round, and reactive to light [Extraocular Movements] : extraocular movements were intact [Outer Ear] : the ears and nose were normal in appearance [Oropharynx] : the oropharynx was normal [Neck Appearance] : the appearance of the neck was normal [Jugular Venous Distention Increased] : there was no jugular-venous distention [Neck Cervical Mass (___cm)] : no neck mass was observed [Thyroid Diffuse Enlargement] : the thyroid was not enlarged [Thyroid Nodule] : there were no palpable thyroid nodules [Auscultation Breath Sounds / Voice Sounds] : lungs were clear to auscultation bilaterally [Heart Rate And Rhythm] : heart rate was normal and rhythm regular [Heart Sounds] : normal S1 and S2 [Heart Sounds Gallop] : no gallops [Murmurs] : no murmurs [Heart Sounds Pericardial Friction Rub] : no pericardial rub [Bowel Sounds] : normal bowel sounds [Abdomen Soft] : soft [Abdomen Tenderness] : non-tender [] : no hepato-splenomegaly [Abdomen Mass (___ Cm)] : no abdominal mass palpated [Supraclavicular Lymph Nodes Enlarged Bilaterally] : supraclavicular [Axillary Lymph Nodes Enlarged Bilaterally] : axillary [Femoral Lymph Nodes Enlarged Bilaterally] : femoral [Inguinal Lymph Nodes Enlarged Bilaterally] : inguinal [Deep Tendon Reflexes (DTR)] : deep tendon reflexes were 2+ and symmetric [Sensation] : the sensory exam was normal to light touch and pinprick [No Focal Deficits] : no focal deficits [Oriented To Time, Place, And Person] : oriented to person, place, and time [Impaired Insight] : insight and judgment were intact [Affect] : the affect was normal

## 2021-09-29 NOTE — PATIENT PROFILE ADULT - NSPROGENPREVTRANSF_GEN_A_NUR
no Referred To Mid-Level For Closure Text (Leave Blank If You Do Not Want): After obtaining clear surgical margins the patient was sent to a mid-level provider for surgical repair.  The patient understands they will receive post-surgical care and follow-up from the mid-level provider.

## 2022-02-03 ENCOUNTER — APPOINTMENT (OUTPATIENT)
Dept: GASTROENTEROLOGY | Facility: CLINIC | Age: 82
End: 2022-02-03
Payer: MEDICARE

## 2022-02-03 VITALS
BODY MASS INDEX: 37.29 KG/M2 | HEIGHT: 59 IN | HEART RATE: 66 BPM | WEIGHT: 185 LBS | DIASTOLIC BLOOD PRESSURE: 70 MMHG | SYSTOLIC BLOOD PRESSURE: 121 MMHG

## 2022-02-03 PROCEDURE — 99213 OFFICE O/P EST LOW 20 MIN: CPT

## 2022-02-03 NOTE — HISTORY OF PRESENT ILLNESS
[FreeTextEntry1] : Doing quite well on q. day, Synthroid, 3 mg every 3 days. No diarrhea, weight is down feeling well

## 2022-03-21 RX ORDER — BUDESONIDE 3 MG/1
3 CAPSULE, COATED PELLETS ORAL
Qty: 30 | Refills: 0 | Status: ACTIVE | COMMUNITY
Start: 2021-01-11 | End: 1900-01-01

## 2022-05-03 ENCOUNTER — APPOINTMENT (OUTPATIENT)
Dept: GASTROENTEROLOGY | Facility: CLINIC | Age: 82
End: 2022-05-03
Payer: MEDICARE

## 2022-05-03 VITALS
DIASTOLIC BLOOD PRESSURE: 71 MMHG | BODY MASS INDEX: 37.29 KG/M2 | HEART RATE: 74 BPM | WEIGHT: 185 LBS | HEIGHT: 59 IN | SYSTOLIC BLOOD PRESSURE: 144 MMHG

## 2022-05-03 DIAGNOSIS — K50.90 CROHN'S DISEASE, UNSPECIFIED, W/OUT COMPLICATIONS: ICD-10-CM

## 2022-05-03 PROCEDURE — 99213 OFFICE O/P EST LOW 20 MIN: CPT

## 2022-05-03 NOTE — HISTORY OF PRESENT ILLNESS
[FreeTextEntry1] : Patient doing quite well undigested I., no diarrhea, and no cramps feeling quite well

## 2022-05-03 NOTE — PHYSICAL EXAM
[General Appearance - Alert] : alert [General Appearance - In No Acute Distress] : in no acute distress [Sclera] : the sclera and conjunctiva were normal [PERRL With Normal Accommodation] : pupils were equal in size, round, and reactive to light [Extraocular Movements] : extraocular movements were intact [Outer Ear] : the ears and nose were normal in appearance [Oropharynx] : the oropharynx was normal [Neck Appearance] : the appearance of the neck was normal [Neck Cervical Mass (___cm)] : no neck mass was observed [Thyroid Diffuse Enlargement] : the thyroid was not enlarged [Jugular Venous Distention Increased] : there was no jugular-venous distention [Thyroid Nodule] : there were no palpable thyroid nodules [Auscultation Breath Sounds / Voice Sounds] : lungs were clear to auscultation bilaterally [Heart Rate And Rhythm] : heart rate was normal and rhythm regular [Heart Sounds] : normal S1 and S2 [Heart Sounds Gallop] : no gallops [Murmurs] : no murmurs [Heart Sounds Pericardial Friction Rub] : no pericardial rub [Bowel Sounds] : normal bowel sounds [Abdomen Soft] : soft [Abdomen Tenderness] : non-tender [] : no hepato-splenomegaly [Abdomen Mass (___ Cm)] : no abdominal mass palpated [Supraclavicular Lymph Nodes Enlarged Bilaterally] : supraclavicular [Axillary Lymph Nodes Enlarged Bilaterally] : axillary [Femoral Lymph Nodes Enlarged Bilaterally] : femoral [Inguinal Lymph Nodes Enlarged Bilaterally] : inguinal [Deep Tendon Reflexes (DTR)] : deep tendon reflexes were 2+ and symmetric [Sensation] : the sensory exam was normal to light touch and pinprick [No Focal Deficits] : no focal deficits [Oriented To Time, Place, And Person] : oriented to person, place, and time [Impaired Insight] : insight and judgment were intact [Affect] : the affect was normal

## 2022-08-09 ENCOUNTER — APPOINTMENT (OUTPATIENT)
Dept: GASTROENTEROLOGY | Facility: CLINIC | Age: 82
End: 2022-08-09

## 2022-10-03 RX ORDER — ONDANSETRON 8 MG/1
4 TABLET, FILM COATED ORAL ONCE
Refills: 0 | Status: DISCONTINUED | OUTPATIENT
Start: 2022-10-04 | End: 2022-10-04

## 2022-10-03 RX ORDER — DILTIAZEM HCL 120 MG
1 CAPSULE, EXT RELEASE 24 HR ORAL
Qty: 0 | Refills: 0 | DISCHARGE

## 2022-10-03 RX ORDER — SODIUM CHLORIDE 9 MG/ML
1000 INJECTION, SOLUTION INTRAVENOUS
Refills: 0 | Status: DISCONTINUED | OUTPATIENT
Start: 2022-10-04 | End: 2022-10-04

## 2022-10-03 RX ORDER — FENTANYL CITRATE 50 UG/ML
50 INJECTION INTRAVENOUS
Refills: 0 | Status: DISCONTINUED | OUTPATIENT
Start: 2022-10-04 | End: 2022-10-04

## 2022-10-03 RX ORDER — ROSIGLITAZONE MALEATE 4 MG
1 TABLET ORAL
Qty: 0 | Refills: 0 | DISCHARGE

## 2022-10-03 RX ORDER — RIVAROXABAN 15 MG-20MG
1 KIT ORAL
Qty: 0 | Refills: 0 | DISCHARGE

## 2022-10-03 RX ORDER — ASPIRIN/CALCIUM CARB/MAGNESIUM 324 MG
1 TABLET ORAL
Qty: 0 | Refills: 0 | DISCHARGE

## 2022-10-04 ENCOUNTER — TRANSCRIPTION ENCOUNTER (OUTPATIENT)
Age: 82
End: 2022-10-04

## 2022-10-04 ENCOUNTER — OUTPATIENT (OUTPATIENT)
Dept: INPATIENT UNIT | Facility: HOSPITAL | Age: 82
LOS: 1 days | Discharge: HOME | End: 2022-10-04
Payer: MEDICARE

## 2022-10-04 VITALS
OXYGEN SATURATION: 100 % | RESPIRATION RATE: 16 BRPM | WEIGHT: 182.1 LBS | HEIGHT: 61 IN | HEART RATE: 83 BPM | TEMPERATURE: 97 F | SYSTOLIC BLOOD PRESSURE: 101 MMHG | DIASTOLIC BLOOD PRESSURE: 58 MMHG

## 2022-10-04 VITALS
OXYGEN SATURATION: 100 % | RESPIRATION RATE: 16 BRPM | DIASTOLIC BLOOD PRESSURE: 58 MMHG | SYSTOLIC BLOOD PRESSURE: 93 MMHG | HEART RATE: 71 BPM

## 2022-10-04 DIAGNOSIS — M20.42 OTHER HAMMER TOE(S) (ACQUIRED), LEFT FOOT: ICD-10-CM

## 2022-10-04 DIAGNOSIS — M20.12 HALLUX VALGUS (ACQUIRED), LEFT FOOT: ICD-10-CM

## 2022-10-04 DIAGNOSIS — Z98.49 CATARACT EXTRACTION STATUS, UNSPECIFIED EYE: Chronic | ICD-10-CM

## 2022-10-04 DIAGNOSIS — Z98.890 OTHER SPECIFIED POSTPROCEDURAL STATES: Chronic | ICD-10-CM

## 2022-10-04 DIAGNOSIS — Z98.1 ARTHRODESIS STATUS: Chronic | ICD-10-CM

## 2022-10-04 DIAGNOSIS — E89.0 POSTPROCEDURAL HYPOTHYROIDISM: Chronic | ICD-10-CM

## 2022-10-04 PROCEDURE — 73630 X-RAY EXAM OF FOOT: CPT | Mod: 26,LT

## 2022-10-04 PROCEDURE — C1769: CPT

## 2022-10-04 PROCEDURE — C1713: CPT

## 2022-10-04 PROCEDURE — 73630 X-RAY EXAM OF FOOT: CPT | Mod: LT

## 2022-10-04 PROCEDURE — 76000 FLUOROSCOPY <1 HR PHYS/QHP: CPT

## 2022-10-04 RX ORDER — EZETIMIBE AND SIMVASTATIN 10; 80 MG/1; MG/1
1 TABLET, FILM COATED ORAL
Qty: 0 | Refills: 0 | DISCHARGE

## 2022-10-04 RX ORDER — FLUTICASONE PROPIONATE 50 MCG
1 SPRAY, SUSPENSION NASAL
Qty: 0 | Refills: 0 | DISCHARGE

## 2022-10-04 RX ORDER — FUROSEMIDE 40 MG
1 TABLET ORAL
Qty: 0 | Refills: 0 | DISCHARGE

## 2022-10-04 RX ORDER — MAGNESIUM OXIDE 400 MG ORAL TABLET 241.3 MG
1 TABLET ORAL
Qty: 0 | Refills: 0 | DISCHARGE

## 2022-10-04 RX ORDER — RIVAROXABAN 15 MG-20MG
1 KIT ORAL
Qty: 0 | Refills: 0 | DISCHARGE

## 2022-10-04 RX ORDER — BUDESONIDE, MICRONIZED 100 %
3 POWDER (GRAM) MISCELLANEOUS
Qty: 0 | Refills: 0 | DISCHARGE

## 2022-10-04 RX ORDER — LEVOTHYROXINE SODIUM 125 MCG
1 TABLET ORAL
Qty: 0 | Refills: 0 | DISCHARGE

## 2022-10-04 RX ORDER — CHOLECALCIFEROL (VITAMIN D3) 125 MCG
1 CAPSULE ORAL
Qty: 0 | Refills: 0 | DISCHARGE

## 2022-10-04 RX ORDER — GABAPENTIN 400 MG/1
2 CAPSULE ORAL
Qty: 0 | Refills: 0 | DISCHARGE

## 2022-10-04 RX ORDER — BUDESONIDE, MICRONIZED 100 %
0 POWDER (GRAM) MISCELLANEOUS
Qty: 0 | Refills: 0 | DISCHARGE

## 2022-10-04 RX ORDER — FERROUS GLUCONATE 100 %
1 POWDER (GRAM) MISCELLANEOUS
Qty: 0 | Refills: 0 | DISCHARGE

## 2022-10-04 RX ORDER — ESOMEPRAZOLE MAGNESIUM 40 MG/1
1 CAPSULE, DELAYED RELEASE ORAL
Qty: 0 | Refills: 0 | DISCHARGE

## 2022-10-04 RX ORDER — RANITIDINE HYDROCHLORIDE 150 MG/1
1 TABLET, FILM COATED ORAL
Qty: 0 | Refills: 0 | DISCHARGE

## 2022-10-04 RX ORDER — PITAVASTATIN CALCIUM 1.04 MG/1
1 TABLET, FILM COATED ORAL
Qty: 0 | Refills: 0 | DISCHARGE

## 2022-10-04 RX ORDER — ESCITALOPRAM OXALATE 10 MG/1
1 TABLET, FILM COATED ORAL
Qty: 0 | Refills: 0 | DISCHARGE

## 2022-10-04 RX ORDER — METOPROLOL TARTRATE 50 MG
1 TABLET ORAL
Qty: 0 | Refills: 0 | DISCHARGE

## 2022-10-04 RX ORDER — OXYCODONE HYDROCHLORIDE 5 MG/1
5 TABLET ORAL ONCE
Refills: 0 | Status: DISCONTINUED | OUTPATIENT
Start: 2022-10-04 | End: 2022-10-04

## 2022-10-04 RX ORDER — SACUBITRIL AND VALSARTAN 24; 26 MG/1; MG/1
1 TABLET, FILM COATED ORAL
Qty: 0 | Refills: 0 | DISCHARGE

## 2022-10-04 RX ORDER — ASCORBIC ACID 60 MG
1 TABLET,CHEWABLE ORAL
Qty: 0 | Refills: 0 | DISCHARGE

## 2022-10-04 RX ORDER — DRONEDARONE 400 MG/1
1 TABLET, FILM COATED ORAL
Qty: 0 | Refills: 0 | DISCHARGE

## 2022-10-04 RX ORDER — VITAMIN E 100 UNIT
0 CAPSULE ORAL
Qty: 0 | Refills: 0 | DISCHARGE

## 2022-10-04 RX ORDER — OMEGA-3 ACID ETHYL ESTERS 1 G
1 CAPSULE ORAL
Qty: 0 | Refills: 0 | DISCHARGE

## 2022-10-04 RX ORDER — ASCORBIC ACID 60 MG
1000 TABLET,CHEWABLE ORAL
Qty: 0 | Refills: 0 | DISCHARGE

## 2022-10-04 RX ORDER — ASCORBIC ACID 60 MG
0 TABLET,CHEWABLE ORAL
Qty: 0 | Refills: 0 | DISCHARGE

## 2022-10-04 RX ORDER — OMEGA-3 ACID ETHYL ESTERS 1 G
0 CAPSULE ORAL
Qty: 0 | Refills: 0 | DISCHARGE

## 2022-10-04 RX ORDER — SODIUM CHLORIDE 9 MG/ML
300 INJECTION, SOLUTION INTRAVENOUS ONCE
Refills: 0 | Status: COMPLETED | OUTPATIENT
Start: 2022-10-04 | End: 2022-10-04

## 2022-10-04 RX ADMIN — OXYCODONE HYDROCHLORIDE 5 MILLIGRAM(S): 5 TABLET ORAL at 14:15

## 2022-10-04 RX ADMIN — OXYCODONE HYDROCHLORIDE 5 MILLIGRAM(S): 5 TABLET ORAL at 13:50

## 2022-10-04 RX ADMIN — SODIUM CHLORIDE 900 MILLILITER(S): 9 INJECTION, SOLUTION INTRAVENOUS at 14:42

## 2022-10-04 NOTE — BRIEF OPERATIVE NOTE - OPERATION/FINDINGS
Lapidus of first metatarsal with medial cuneiform, Andrade procedure to first proximal phalanx, correction of hammertoe deformity of toes 2-3

## 2022-10-04 NOTE — ASU PATIENT PROFILE, ADULT - BLOOD AVOIDANCE/RESTRICTIONS, PROFILE
Number Of Freeze-Thaw Cycles: 1 freeze-thaw cycle Consent: The patient's consent was obtained including but not limited to risks of crusting, scabbing, blistering, scarring, darker or lighter pigmentary change, recurrence, incomplete removal and infection. Post-Care Instructions: I reviewed with the patient in detail post-care instructions. Patient is to wear sunprotection, and avoid picking at any of the treated lesions. Pt may apply Vaseline to crusted or scabbing areas. Detail Level: Detailed Duration Of Freeze Thaw-Cycle (Seconds): 3 none Render Post-Care Instructions In Note?: yes

## 2022-10-04 NOTE — ASU PATIENT PROFILE, ADULT - NSICDXPASTSURGICALHX_GEN_ALL_CORE_FT
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

## 2022-10-04 NOTE — ASU PREOP CHECKLIST - NSBLOODTRANS_GEN_A_CORE_SIUH
January 31, 2017      Guy Roberson MD  [address] [place] [address] Dr Danielle GASCA [address]           [facility]  [id number] [place] [address]  [place] [address]  Phone: [phone]  Fax: [phone]          Patient: Adelina Espitia   MR Number: [id number]   YOB: 1958   Date of Visit: 1/31/2017       Dear Dr. Guy Roberson:    Thank you for referring Adelina Espitia to me for evaluation. Attached you will find relevant portions of my assessment and plan of care.    If you have questions, please do not hesitate to call me. I look forward to following Adelina Espitia along with you.    Sincerely,    Misbah Childs MD    Enclosure  CC:  No Recipients    If you would like to receive this communication electronically, please contact [email or web address] or [phone] to request more information on [employer] access.    For providers and/or their staff who would like to refer a patient to [facility], please contact us through our one-stop-shop provider referral line, [facility] , at [phone].    If you feel you have received this communication in error or would no longer like to receive these types of communications, please e-mail [email or web address]          no...

## 2022-10-04 NOTE — ASU DISCHARGE PLAN (ADULT/PEDIATRIC) - PROCEDURE
Left forefoot reconstruction: Lapidus procedure, Raymond Donnelly procedure 1st proximal phalanx, 2nd toe arthroplasty, capsulotomy 2,3 digit.

## 2022-10-04 NOTE — ASU DISCHARGE PLAN (ADULT/PEDIATRIC) - NURSING INSTRUCTIONS
Refer to the multicolored fact sheet for any problems you experience. If you have difficulty urinating or unable to urinate in 8 hours after surgery, call your Dr., or return to  emergency room.  Notify your Dr. if your fever is 101 or greater. If the pain medicine your  recgracielands does not help you, or you have severe pain call your Dr.. If you cannot reach the doctor, call [facility] Emergency Department at [phone] or go to your local Emergency Department. A responsible adult should be with you for the rest of the day and night for your safety, and to help you. Apply ice to affected area 20min on and 20min off for the  first 24-48 hours. Do not apply directly to skin, place barrier between ice and skin.  Resume your medications as listed on the attached Medication Record.

## 2022-10-04 NOTE — ASU DISCHARGE PLAN (ADULT/PEDIATRIC) - NS MD DC FALL RISK RISK
For information on Fall & Injury Prevention, visit: [email or web address] OR  [email or web address] OR  https://www.cdc.gov/steadi/patient.html

## 2022-10-04 NOTE — BRIEF OPERATIVE NOTE - NSICDXBRIEFPROCEDURE_GEN_ALL_CORE_FT
PROCEDURES:  Fusion of left forefoot 04-Oct-2022 13:10:42 Left forefoot reconstruction with Jeanette Priest

## 2022-10-04 NOTE — ASU DISCHARGE PLAN (ADULT/PEDIATRIC) - CARE PROVIDER_API CALL
Gabbi Mcmillan (DPM)  Surgery Orthopaedic Surgery  [address] [facility], [address] [place] [address]  Phone: [phone]  Fax: [phone]  Follow Up Time:

## 2022-10-04 NOTE — ASU PATIENT PROFILE, ADULT - NSICDXPASTMEDICALHX_GEN_ALL_CORE_FT
PAST MEDICAL HISTORY:  Afib     Anemia     CHF (congestive heart failure)     Essential hypertension     Gastroesophageal reflux disease, esophagitis presence not specified     Gastrointestinal hemorrhage associated with intestinal diverticulosis     H/O: depression     History of Crohn's disease     HLD (hyperlipidemia)     NICM (nonischemic cardiomyopathy)     Systolic heart failure, chronic

## 2022-10-04 NOTE — BRIEF OPERATIVE NOTE - NSICDXBRIEFPOSTOP_GEN_ALL_CORE_FT
POST-OP DIAGNOSIS:  Bunion of great toe of left foot 04-Oct-2022 13:03:17 Bunion of great toe with severe 2-3 hammertoe deformity, Left foot Jeanette Aguirre

## 2022-10-04 NOTE — BRIEF OPERATIVE NOTE - NSICDXBRIEFPREOP_GEN_ALL_CORE_FT
PRE-OP DIAGNOSIS:  Bunion of great toe of left foot 04-Oct-2022 13:01:31 Bunion of great toe with severe 2-3 hammertoe deformity, Left foot Jeanette Aguirre

## 2022-10-04 NOTE — ASU PATIENT PROFILE, ADULT - FALL HARM RISK - HARM RISK INTERVENTIONS

## 2022-10-12 DIAGNOSIS — Z88.8 ALLERGY STATUS TO OTHER DRUGS, MEDICAMENTS AND BIOLOGICAL SUBSTANCES STATUS: ICD-10-CM

## 2022-10-12 DIAGNOSIS — I48.91 UNSPECIFIED ATRIAL FIBRILLATION: ICD-10-CM

## 2022-10-12 DIAGNOSIS — M21.612 BUNION OF LEFT FOOT: ICD-10-CM

## 2022-10-12 DIAGNOSIS — E78.01 FAMILIAL HYPERCHOLESTEROLEMIA: ICD-10-CM

## 2022-10-12 DIAGNOSIS — I42.8 OTHER CARDIOMYOPATHIES: ICD-10-CM

## 2022-10-12 DIAGNOSIS — I50.20 UNSPECIFIED SYSTOLIC (CONGESTIVE) HEART FAILURE: ICD-10-CM

## 2022-10-12 DIAGNOSIS — I11.0 HYPERTENSIVE HEART DISEASE WITH HEART FAILURE: ICD-10-CM

## 2022-10-12 DIAGNOSIS — M20.42 OTHER HAMMER TOE(S) (ACQUIRED), LEFT FOOT: ICD-10-CM

## 2022-10-12 DIAGNOSIS — Z98.1 ARTHRODESIS STATUS: ICD-10-CM

## 2022-10-12 DIAGNOSIS — K21.9 GASTRO-ESOPHAGEAL REFLUX DISEASE WITHOUT ESOPHAGITIS: ICD-10-CM

## 2022-10-12 DIAGNOSIS — Z79.01 LONG TERM (CURRENT) USE OF ANTICOAGULANTS: ICD-10-CM

## 2022-10-19 PROBLEM — I42.8 OTHER CARDIOMYOPATHIES: Chronic | Status: ACTIVE | Noted: 2022-10-03

## 2022-10-19 PROBLEM — D64.9 ANEMIA, UNSPECIFIED: Chronic | Status: ACTIVE | Noted: 2022-10-03

## 2022-10-19 PROBLEM — Z86.59 PERSONAL HISTORY OF OTHER MENTAL AND BEHAVIORAL DISORDERS: Chronic | Status: ACTIVE | Noted: 2022-10-03

## 2022-10-19 PROBLEM — Z87.19 PERSONAL HISTORY OF OTHER DISEASES OF THE DIGESTIVE SYSTEM: Chronic | Status: ACTIVE | Noted: 2022-10-03

## 2022-10-19 PROBLEM — I50.22 CHRONIC SYSTOLIC (CONGESTIVE) HEART FAILURE: Chronic | Status: ACTIVE | Noted: 2022-10-04

## 2022-10-19 PROBLEM — E78.5 HYPERLIPIDEMIA, UNSPECIFIED: Chronic | Status: ACTIVE | Noted: 2022-10-03

## 2022-12-08 ENCOUNTER — RX RENEWAL (OUTPATIENT)
Age: 82
End: 2022-12-08

## 2022-12-08 RX ORDER — BUDESONIDE 3 MG/1
3 CAPSULE, COATED PELLETS ORAL DAILY
Qty: 270 | Refills: 1 | Status: ACTIVE | COMMUNITY
Start: 2018-10-31 | End: 1900-01-01

## 2023-01-21 NOTE — ED ADULT NURSE REASSESSMENT NOTE - TEMPLATE LIST FOR HEAD TO TOE ASSESSMENT
General Franchesca Duggan # [id number]

## 2023-04-17 NOTE — H&P ADULT - ASSESSMENT
78 year old woman with Afib on Xarelto, HTN presented to OSH for SOB now transferred to [facility] for newly diagnosed acute decompensated systolic heart failure currently undergoing diuresis.    Neuro:  -no active issues    Cardiovascular:    1. Acute decompensated systolic heart failure  -TTE from OSH showed EF: 20-25%, mod MR, mod TR, mod diffuse hypokinesis of LV.  She had an echo done in 2017 which showed EF 55-60%. Unclear etiology ischemic vs non-ischemic  -BNP 25770  -IV lasix 80mg now  -monitor UOP, strict I/O; has escamilla in place  -f/u UOP and dose Lasix appropriately  -holding BB in setting of ADHF  -holding ACEi/ARB in setting of NORM  -may need cardiac cath to evaluate for ischemic causes of ADHF     2. Afib   -CHADVASC: 5  -holding rivoraxaban, c/w heparin ggt  -holding BB in setting of ADHF  -on tele, may be afib with aberrancy; f/u digoxin level     3. HTN  -holding anti-hypertensives in setting of low BP    Pulmonary  -currently on 2L NC  -continue with diuresis as above  -continue to monitor    GI  1. Congestive hepatopathy  -LFTs peaked at AST/ALT  4473/2724  -currently trending down  -continue to monitor       2. Nausea  -c/o of nausea, QTC is 531 most likely in the setting of afib with aberrancy   -monitor QTC and zofran as needed    3. Gallbladder thickening  -at OSH, CT abd/pelvis which showed gallbladder wall thickening and colonic diverticulossi without diverticulitis.  Abd US was done which showed cholithiasis and severe diffuse gallbladder wall thickening.  Surgery was consulted and recommended HIDA scan but no surgical intervention. Patient not actively having symptoms; will hold off on HIDA scan.    Renal:  1. NORM  -most likely in the 2/2 of low flow state  -continue to monitor Cr  -currently improving    ID:  -at OSH, was on vanc/cefepime for ?HAP  -blood cx 7/11: NGTD  -will continue to monitor off abx    Heme:  -chronic anemia from iron-deficiency anemia (past Iron studies done in 07/19)  -continue to monitor CBC  -c/w ferrous sulfate     Medication management:  -medrec couldn't be completed as pharmacy was closed: pharmacy is [facility] ([phone])  -please obtain med rec     DVT ppx: heparin ggt       Saud Dunlap, PGY-2 [Negative] : Heme/Lymph [FreeTextEntry9] : JOINT PAIN [FreeTextEntry1] : KIDNEY DISEASE

## 2023-07-14 NOTE — ED PROVIDER NOTE - OBJECTIVE STATEMENT
[Hearing Loss] : hearing loss [Ear Noises] : ear noises [Negative] : Heme/Lymph 79 yo female biba from home for increasing dyspnea with sob that started at 1130pm last night. Pt has ho chf. According to ems pt had resp arrest en route and needed to have bvm. on arrival pt is sob and dsypneic but awake, alert and oriented. cardiologist is Dr. Cerda. pt has afib and is on xarelto.

## 2023-10-03 NOTE — DISCHARGE NOTE ADULT - NS AS DC HF EDUCATION INSTRUCTIONS
Body Location Override (Optional): Nasal Tip
Detail Level: Detailed
Pathology Override (Pathology Will Render As Diagnosis Name If Left Blank): Primary Invasive and Moderately-Differentiated Squamous Cell Carcinoma
Field Number: 1
Lesion Dimensions-X Axis In Cm: 1.2
Lesion Dimensions-Y Axis In Cm: 1.3
Lesion Margin Size In Cm: 0.5
Applicator Size In Cm: 2.0 cm
Energy (Kv): 70
Treatment Time (Min): 0.41
Time, Dose, Fractionation Factor (Tdf) For Prescription 1: 95
Daily Dose (Cgy): 270.19
Number Of Fractions For Prescription 1: 20
Treatments Per Week: 3
Total Dose For Prescription 1 (Cgy): 5403.80
Add A Second Prescription?: No
Additional Fraction(S) Needed:: 0
Bill For Physics Consultation: No - Render Text Only
Physics Documentation: (Physics Check Verbiage)
Body Location Override (Optional): Left Sideburn
Pathology Override (Pathology Will Render As Diagnosis Name If Left Blank): Primary Squamous Cell Carcinoma in situ
Field Number: 2
Lesion Dimensions-Y Axis In Cm: 0.8
Shield Size In Cm: 1.7cm x 1.7cm
Body Location Override (Optional): Left Lateral Cheek
Lesion Dimensions-Y Axis In Cm: 0.9
Call Primary Care Provider for follow-up after discharge/Monitor Weight Daily/Activities as tolerated/Low salt diet/Report weight gain of 2 or more pounds in one day or 3 or more pounds in one week, worsening shortness of breath, fatigue, weakness, increased swelling of hands and feet to primary care provider

## 2023-10-31 NOTE — ASU DISCHARGE PLAN (ADULT/PEDIATRIC) - DISCHARGE TO
Home Cartilage Graft Text: The defect edges were debeveled with a #15 scalpel blade. Given the location of the defect, shape of the defect, the fact the defect involved a full thickness cartilage defect a cartilage graft was deemed most appropriate.  An appropriate donor site was identified, cleansed, and anesthetized. The cartilage graft was then harvested and transferred to the recipient site, oriented appropriately and then sutured into place.  The secondary defect was then repaired using a primary closure.

## 2024-01-04 NOTE — PATIENT PROFILE ADULT. - NSSUBSTANCEUSE_GEN_ALL_CORE_SD
Detail Level: Detailed
Disc blood work \\nPt reports there were never any bumps, redness, scale\\nPt reports very itchy \\nPt reports is in monogamous relationship\\nPt reports is fearful of herpes \\nPt reports does not use condoms or lubricants \\nDisc using dove unscented
caffeine

## 2024-02-19 NOTE — PROGRESS NOTE ADULT - COMMENTS
Returned Anita's VM regarding questions about Mammogram scheduled on Monday. Informed her that was part of the surgery and there is nothing that needed to be done on her end. Discussed that we still need updated H/P. Per Anita PCP rounds at NH on Tuesdays. Fax number for our office was given to Anita so that updated H/P could be faxed over. All questions answered.  
ROS o/w negative
ROS o/w negative

## 2024-07-13 ENCOUNTER — NON-APPOINTMENT (OUTPATIENT)
Age: 84
End: 2024-07-13

## 2024-07-22 ENCOUNTER — NON-APPOINTMENT (OUTPATIENT)
Age: 84
End: 2024-07-22

## 2025-07-24 NOTE — PROGRESS NOTE ADULT - MUSCULOSKELETAL
Steve Wilson(PA)
No joint pain, swelling or deformity; no limitation of movement
No joint pain, swelling or deformity; no limitation of movement